# Patient Record
Sex: MALE | Race: WHITE | NOT HISPANIC OR LATINO | Employment: FULL TIME | URBAN - METROPOLITAN AREA
[De-identification: names, ages, dates, MRNs, and addresses within clinical notes are randomized per-mention and may not be internally consistent; named-entity substitution may affect disease eponyms.]

---

## 2020-02-12 ENCOUNTER — TELEPHONE (OUTPATIENT)
Dept: FAMILY MEDICINE | Facility: CLINIC | Age: 55
End: 2020-02-12

## 2020-02-13 ENCOUNTER — TELEPHONE (OUTPATIENT)
Dept: FAMILY MEDICINE | Facility: CLINIC | Age: 55
End: 2020-02-13

## 2020-02-13 NOTE — TELEPHONE ENCOUNTER
Spoke to patient that it is ok to wait until prior to ov for lab and that we will call a couple weeks prior to appt to get drawn. Remind me created.

## 2020-02-13 NOTE — TELEPHONE ENCOUNTER
Spoke to patient, states he would like to hold off on lab until prior to physical date of June 10th. States he really hasn't been watching his diet and also is concerned about insurance covering labs twice in a year. His last numbers for lipid in [facility] were total 218, HDL 64, Trig 48, . Send back to Maddy to call pt and create remind me

## 2020-05-27 ENCOUNTER — TELEPHONE (OUTPATIENT)
Dept: FAMILY MEDICINE | Facility: CLINIC | Age: 55
End: 2020-05-27

## 2020-05-27 DIAGNOSIS — Z00.00 ANNUAL PHYSICAL EXAM: Primary | ICD-10-CM

## 2020-05-27 NOTE — TELEPHONE ENCOUNTER
From remind me, lab due prior to ov. Needs all yearly labs. Orders pended to [facility]. LMOR for patient that orders were being sent to [facility], he can come to our office, and be sure he is fasting.

## 2020-06-04 LAB
ALBUMIN SERPL-MCNC: 4.3 G/DL (ref 3.6–5.1)
ALBUMIN/GLOB SERPL: 1.9 (CALC) (ref 1–2.5)
ALP SERPL-CCNC: 55 U/L (ref 35–144)
ALT SERPL-CCNC: 20 U/L (ref 9–46)
APPEARANCE UR: CLEAR
AST SERPL-CCNC: 21 U/L (ref 10–35)
BACTERIA #/AREA URNS HPF: NORMAL /HPF
BACTERIA UR CULT: NORMAL
BASOPHILS # BLD AUTO: 71 CELLS/UL (ref 0–200)
BASOPHILS NFR BLD AUTO: 1.7 %
BILIRUB SERPL-MCNC: 0.6 MG/DL (ref 0.2–1.2)
BILIRUB UR QL STRIP: NEGATIVE
BUN SERPL-MCNC: 19 MG/DL (ref 7–25)
BUN/CREAT SERPL: NORMAL (CALC) (ref 6–22)
CALCIUM SERPL-MCNC: 9.7 MG/DL (ref 8.6–10.3)
CHLORIDE SERPL-SCNC: 105 MMOL/L (ref 98–110)
CHOLEST SERPL-MCNC: 211 MG/DL
CHOLEST/HDLC SERPL: 3.2 (CALC)
CO2 SERPL-SCNC: 29 MMOL/L (ref 20–32)
COLOR UR: YELLOW
CREAT SERPL-MCNC: 0.92 MG/DL (ref 0.7–1.33)
EOSINOPHIL # BLD AUTO: 59 CELLS/UL (ref 15–500)
EOSINOPHIL NFR BLD AUTO: 1.4 %
ERYTHROCYTE [DISTWIDTH] IN BLOOD BY AUTOMATED COUNT: 12.5 % (ref 11–15)
GFRSERPLBLD MDRD-ARVRAT: 93 ML/MIN/1.73M2
GLOBULIN SER CALC-MCNC: 2.3 G/DL (CALC) (ref 1.9–3.7)
GLUCOSE SERPL-MCNC: 96 MG/DL (ref 65–99)
GLUCOSE UR QL STRIP: NEGATIVE
HCT VFR BLD AUTO: 43.9 % (ref 38.5–50)
HDLC SERPL-MCNC: 66 MG/DL
HGB BLD-MCNC: 14.4 G/DL (ref 13.2–17.1)
HGB UR QL STRIP: NEGATIVE
HYALINE CASTS #/AREA URNS LPF: NORMAL /LPF
KETONES UR QL STRIP: NEGATIVE
LDLC SERPL CALC-MCNC: 129 MG/DL (CALC)
LEUKOCYTE ESTERASE UR QL STRIP: NEGATIVE
LYMPHOCYTES # BLD AUTO: 1722 CELLS/UL (ref 850–3900)
LYMPHOCYTES NFR BLD AUTO: 41 %
MCH RBC QN AUTO: 31.2 PG (ref 27–33)
MCHC RBC AUTO-ENTMCNC: 32.8 G/DL (ref 32–36)
MCV RBC AUTO: 95.2 FL (ref 80–100)
MONOCYTES # BLD AUTO: 475 CELLS/UL (ref 200–950)
MONOCYTES NFR BLD AUTO: 11.3 %
NEUTROPHILS # BLD AUTO: 1873 CELLS/UL (ref 1500–7800)
NEUTROPHILS NFR BLD AUTO: 44.6 %
NITRITE UR QL STRIP: NEGATIVE
NONHDLC SERPL-MCNC: 145 MG/DL (CALC)
PH UR STRIP: 7.5 [PH] (ref 5–8)
PLATELET # BLD AUTO: 301 THOUSAND/UL (ref 140–400)
PMV BLD REES-ECKER: 11 FL (ref 7.5–12.5)
POTASSIUM SERPL-SCNC: 4.5 MMOL/L (ref 3.5–5.3)
PROT SERPL-MCNC: 6.6 G/DL (ref 6.1–8.1)
PROT UR QL STRIP: NEGATIVE
RBC # BLD AUTO: 4.61 MILLION/UL (ref 4.2–5.8)
RBC #/AREA URNS HPF: NORMAL /HPF
SODIUM SERPL-SCNC: 141 MMOL/L (ref 135–146)
SP GR UR STRIP: 1.02 (ref 1–1.03)
SQUAMOUS #/AREA URNS HPF: NORMAL /HPF
TRIGL SERPL-MCNC: 66 MG/DL
TSH SERPL-ACNC: 1.8 MIU/L (ref 0.4–4.5)
WBC # BLD AUTO: 4.2 THOUSAND/UL (ref 3.8–10.8)
WBC #/AREA URNS HPF: NORMAL /HPF

## 2020-06-10 ENCOUNTER — OFFICE VISIT (OUTPATIENT)
Dept: FAMILY MEDICINE | Facility: CLINIC | Age: 55
End: 2020-06-10
Payer: COMMERCIAL

## 2020-06-10 VITALS
HEART RATE: 76 BPM | SYSTOLIC BLOOD PRESSURE: 132 MMHG | WEIGHT: 215 LBS | DIASTOLIC BLOOD PRESSURE: 86 MMHG | BODY MASS INDEX: 31.84 KG/M2 | HEIGHT: 69 IN

## 2020-06-10 DIAGNOSIS — N20.0 NEPHROLITHIASIS: ICD-10-CM

## 2020-06-10 DIAGNOSIS — E78.00 PURE HYPERCHOLESTEROLEMIA: ICD-10-CM

## 2020-06-10 DIAGNOSIS — Z00.00 WELLNESS EXAMINATION: Primary | ICD-10-CM

## 2020-06-10 DIAGNOSIS — L57.0 ACTINIC KERATOSES: ICD-10-CM

## 2020-06-10 PROCEDURE — 99396 PREV VISIT EST AGE 40-64: CPT | Mod: S$GLB,,, | Performed by: FAMILY MEDICINE

## 2020-06-10 PROCEDURE — 99396 PR PREVENTIVE VISIT,EST,40-64: ICD-10-PCS | Mod: S$GLB,,, | Performed by: FAMILY MEDICINE

## 2020-06-10 RX ORDER — ASPIRIN 81 MG/1
81 TABLET ORAL DAILY
COMMUNITY

## 2020-06-10 RX ORDER — VIT C/E/ZN/COPPR/LUTEIN/ZEAXAN 250MG-90MG
1000 CAPSULE ORAL DAILY
COMMUNITY

## 2020-06-10 RX ORDER — FLAXSEED OIL 1000 MG
CAPSULE ORAL
COMMUNITY

## 2020-06-10 RX ORDER — LANOLIN ALCOHOL/MO/W.PET/CERES
100 CREAM (GRAM) TOPICAL DAILY
COMMUNITY

## 2020-06-10 RX ORDER — IBUPROFEN 100 MG/5ML
1000 SUSPENSION, ORAL (FINAL DOSE FORM) ORAL DAILY
COMMUNITY

## 2020-06-10 RX ORDER — GLUCOSAM/CHONDRO/HERB 149/HYAL 750-100 MG
TABLET ORAL
COMMUNITY

## 2020-06-10 RX ORDER — VITAMIN E 268 MG
400 CAPSULE ORAL DAILY
COMMUNITY

## 2020-06-10 NOTE — PROGRESS NOTES
SUBJECTIVE:    Patient ID: Trino Head is a 55 y.o. male.    Chief Complaint: Annual Exam (not taking any prescription meds // Colonoscopy - Dr. Chauhan ac)    This 55-year-old male comes in for his annual wellness checkup.  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  At night he walks in the neighborhood with his wife 30 min 3 times a week.  He states he does have some pains in his knees and wears knee sleeves and right elbow sleeve.    Last tetanus vaccine 2005.    2017-right knee injection with cortisone-[facility].    2016-colonoscopy Dr. Chauhan-[facility] 10 years.      Telephone on 05/27/2020   Component Date Value Ref Range Status    WBC 06/03/2020 4.2  3.8 - 10.8 Thousand/uL Final    RBC 06/03/2020 4.61  4.20 - 5.80 Million/uL Final    Hemoglobin 06/03/2020 14.4  13.2 - 17.1 g/dL Final    Hematocrit 06/03/2020 43.9  38.5 - 50.0 % Final    Mean Corpuscular Volume 06/03/2020 95.2  80.0 - 100.0 fL Final    Mean Corpuscular Hemoglobin 06/03/2020 31.2  27.0 - 33.0 pg Final    Mean Corpuscular Hemoglobin Conc 06/03/2020 32.8  32.0 - 36.0 g/dL Final    RDW 06/03/2020 12.5  11.0 - 15.0 % Final    Platelets 06/03/2020 301  140 - 400 Thousand/uL Final    MPV 06/03/2020 11.0  7.5 - 12.5 fL Final    Neutrophils Absolute 06/03/2020 1,873  1,500 - 7,800 cells/uL Final    Lymph # 06/03/2020 1,722  850 - 3,900 cells/uL Final    Mono # 06/03/2020 475  200 - 950 cells/uL Final    Eos # 06/03/2020 59  15 - 500 cells/uL Final    Baso # 06/03/2020 71  0 - 200 cells/uL Final    Neutrophils Relative 06/03/2020 44.6  % Final    Lymph% 06/03/2020 41.0  % Final    Mono% 06/03/2020 11.3  % Final    Eosinophil% 06/03/2020 1.4  % Final    Basophil% 06/03/2020 1.7  % Final    Glucose 06/03/2020 96  65 - 99 mg/dL Final    BUN, Bld 06/03/2020 19  7 - 25 mg/dL Final    Creatinine 06/03/2020 0.92  0.70 - 1.33 mg/dL Final    eGFR if non African  American 06/03/2020 93  > OR = 60 mL/min/1.73m2 Final    eGFR if African American 06/03/2020 108  > OR = 60 mL/min/1.73m2 Final    BUN/Creatinine Ratio 06/03/2020 NOT APPLICABLE  6 - 22 (calc) Final    Sodium 06/03/2020 141  135 - 146 mmol/L Final    Potassium 06/03/2020 4.5  3.5 - 5.3 mmol/L Final    Chloride 06/03/2020 105  98 - 110 mmol/L Final    CO2 06/03/2020 29  20 - 32 mmol/L Final    Calcium 06/03/2020 9.7  8.6 - 10.3 mg/dL Final    Total Protein 06/03/2020 6.6  6.1 - 8.1 g/dL Final    Albumin 06/03/2020 4.3  3.6 - 5.1 g/dL Final    Globulin, Total 06/03/2020 2.3  1.9 - 3.7 g/dL (calc) Final    Albumin/Globulin Ratio 06/03/2020 1.9  1.0 - 2.5 (calc) Final    Total Bilirubin 06/03/2020 0.6  0.2 - 1.2 mg/dL Final    Alkaline Phosphatase 06/03/2020 55  35 - 144 U/L Final    AST 06/03/2020 21  10 - 35 U/L Final    ALT 06/03/2020 20  9 - 46 U/L Final    Cholesterol 06/03/2020 211* <200 mg/dL Final    HDL 06/03/2020 66  > OR = 40 mg/dL Final    Triglycerides 06/03/2020 66  <150 mg/dL Final    LDL Cholesterol 06/03/2020 129* mg/dL (calc) Final    Hdl/Cholesterol Ratio 06/03/2020 3.2  <5.0 (calc) Final    Non HDL Chol. (LDL+VLDL) 06/03/2020 145* <130 mg/dL (calc) Final    TSH w/reflex to FT4 06/03/2020 1.80  0.40 - 4.50 mIU/L Final    Color, UA 06/03/2020 YELLOW  YELLOW Final    Appearance, UA 06/03/2020 CLEAR  CLEAR Final    Specific Gravity, UA 06/03/2020 1.022  1.001 - 1.035 Final    pH, UA 06/03/2020 7.5  5.0 - 8.0 Final    Glucose, UA 06/03/2020 NEGATIVE  NEGATIVE Final    Bilirubin, UA 06/03/2020 NEGATIVE  NEGATIVE Final    Ketones, UA 06/03/2020 NEGATIVE  NEGATIVE Final    Occult Blood UA 06/03/2020 NEGATIVE  NEGATIVE Final    Protein, UA 06/03/2020 NEGATIVE  NEGATIVE Final    Nitrite, UA 06/03/2020 NEGATIVE  NEGATIVE Final    Leukocytes, UA 06/03/2020 NEGATIVE  NEGATIVE Final    WBC Casts, UA 06/03/2020 NONE SEEN  < OR = 5 /HPF Final    RBC Casts, UA 06/03/2020 0-2  < OR =  2 /HPF Final    Squam Epithel, UA 06/03/2020 NONE SEEN  < OR = 5 /HPF Final    Bacteria, UA 06/03/2020 NONE SEEN  NONE SEEN /HPF Final    Hyaline Casts, UA 06/03/2020 NONE SEEN  NONE SEEN /LPF Final    Reflexive Urine Culture 06/03/2020 NO CULTURE INDICATED   Final       History reviewed. No pertinent past medical history.  Past Surgical History:   Procedure Laterality Date    COLONOSCOPY  2016    Dr. Chauhan[facility] 10 years     History reviewed. No pertinent family history.    Marital Status:   Alcohol History:  has no alcohol history on file.  Tobacco History:  reports that he has never smoked. He has never used smokeless tobacco.  Drug History:  has no drug history on file.    Review of patient's allergies indicates:  No Known Allergies    Current Outpatient Medications:     ascorbic acid, vitamin C, (VITAMIN C) 1000 MG tablet, Take 1,000 mg by mouth once daily., Disp: , Rfl:     aspirin (ECOTRIN) 81 MG EC tablet, Take 81 mg by mouth once daily., Disp: , Rfl:     cholecalciferol, vitamin D3, (VITAMIN D3) 25 mcg (1,000 unit) capsule, Take 1,000 Units by mouth once daily., Disp: , Rfl:     cyanocobalamin (VITAMIN B-12) 1000 MCG tablet, Take 100 mcg by mouth once daily., Disp: , Rfl:     flaxseed oil 1,000 mg Cap, Take by mouth., Disp: , Rfl:     ginkgo biloba 60 mg Cap, Take by mouth., Disp: , Rfl:     gluc hull/chondro hull A/vit C/Mn (GLUCOSAMINE 1500 COMPLEX ORAL), Take by mouth., Disp: , Rfl:     multivit-min/FA/lycopen/lutein (CENTRUM SILVER MEN ORAL), Take by mouth., Disp: , Rfl:     omega 3-dha-epa-fish oil (FISH OIL) 1,000 mg (120 mg-180 mg) Cap, Take by mouth., Disp: , Rfl:     TURMERIC ORAL, Take by mouth., Disp: , Rfl:     vitamin E 400 UNIT capsule, Take 400 Units by mouth once daily., Disp: , Rfl:     Review of Systems   Constitutional: Negative for appetite change, chills, fatigue, fever and unexpected weight change.   HENT: Negative for congestion, ear pain and trouble swallowing.   "  Eyes: Negative for pain, discharge and visual disturbance.   Respiratory: Negative for apnea, cough, shortness of breath and wheezing.    Cardiovascular: Negative for chest pain and leg swelling.   Gastrointestinal: Negative for abdominal pain, blood in stool, constipation, diarrhea, nausea and vomiting.        Occas  gerd , uses  Tums occas ,    Endocrine: Negative for cold intolerance, heat intolerance and polydipsia.   Genitourinary: Negative for dysuria, hematuria, testicular pain and urgency.        1 yr ago passed  A  Kidney stone.   Musculoskeletal: Positive for arthralgias (rt knee occas aches , occas  Aleve,). Negative for gait problem, joint swelling and myalgias.   Skin:        Sees derm annually AK's removed ,Dr Paige Leigh   Neurological: Negative for dizziness, seizures and numbness.   Psychiatric/Behavioral: Negative for agitation, behavioral problems and hallucinations. The patient is not nervous/anxious.           Objective:      Vitals:    06/10/20 0849   BP: 132/86   Pulse: 76   Weight: 97.5 kg (215 lb)   Height: 5' 9" (1.753 m)     Body mass index is 31.75 kg/m².  Physical Exam   Constitutional: He is oriented to person, place, and time. He appears well-developed and well-nourished.   HENT:   Head: Normocephalic and atraumatic.   Right Ear: External ear normal.   Left Ear: External ear normal.   Nose: Nose normal.   Mouth/Throat: Oropharynx is clear and moist.   Eyes: Pupils are equal, round, and reactive to light. EOM are normal.   Neck: Normal range of motion. Neck supple. Carotid bruit is not present. No thyromegaly present.   Cardiovascular: Normal rate, regular rhythm, normal heart sounds and intact distal pulses.   No murmur heard.  Pulmonary/Chest: Effort normal and breath sounds normal. He has no wheezes. He has no rales.   Abdominal: Soft. Bowel sounds are normal. He exhibits no distension. There is no hepatosplenomegaly. There is no tenderness.   Musculoskeletal: Normal range of " motion. He exhibits no tenderness or deformity.        Lumbar back: Normal. He exhibits no pain and no spasm.   Bends 90 degrees at  waist, shoulders and knees have full range of motion, no peripheral edema seen.  Walks with a normal gait.   Lymphadenopathy:     He has no cervical adenopathy.   Neurological: He is alert and oriented to person, place, and time. No cranial nerve deficit. Coordination normal.   Skin: Skin is warm and dry. No rash noted.   No actinic keratoses visualized today   Psychiatric: He has a normal mood and affect. His behavior is normal. Judgment and thought content normal.   Nursing note and vitals reviewed.        Assessment:       1. Wellness examination    2. Nephrolithiasis    3. Pure hypercholesterolemia    4. Actinic keratoses         Plan:       Wellness examination  Patient has very healthy lifestyle.  No smoking.  Continue exercise in the neighborhood as is  Nephrolithiasis  No recent kidney stones  Pure hypercholesterolemia  Cholesterol mild elevation.  HDL looks elevated with some minimal LDL elevation.  Reduce fried foods in the diet  Actinic keratoses  Continue dermatologic annual visits  Offered tetanus vaccine if he develops a skin puncture wound  No follow-ups on file.

## 2020-06-10 NOTE — PATIENT INSTRUCTIONS
Understanding Actinic Keratosis     Wear a hat that protects your face and ears from the sun.     Actinic keratosis is a skin growth caused by sun damage. These growths are not cancer, but they may develop into skin cancer (precancerous). Many people get these growths, especially as they age. This is because of cumulative sun exposure over years. Multiple growths are called actinic keratoses.  What causes actinic keratosis?  Sun damage causes actinic keratosis. These growths usually appear on skin that is most often exposed to the sun, such as the face, ears, or back of the hands. People who easily sunburn are likely to develop actinic keratoses. Actinic keratoses often appear later in life from cumulative, extensive sun exposure.  What are the symptoms of actinic keratosis?  Actinic keratosis growths may be described as:  · Rough, like sandpaper  · Wart-like  · Scaly or scabby  · More easily felt than seen  They may appear singly or in clusters. They may start out as red patches, and the skin around them may be discolored.  Actinic keratoses usually are not painful. For some people they may make the skin feel tender.  How is actinic keratosis treated?  Because actinic keratoses may develop into skin cancer, a healthcare provider should look at them. Your healthcare provider may choose to remove one or more of these growths. It may be examined under a microscope. This is called a biopsy. You may also wish to have actinic keratoses removed if they bother you. They can be removed in several ways:  · By using a medicine on the skin such as 5 fluorouracil or imiquimod  · By removing them with a scalpel  · By freezing them with liquid nitrogen  How can I prevent actinic keratoses?  Avoiding sun damage to your skin is the best way to prevent actinic keratoses. Here are some ways to protect your skin:  · Use sunscreen with an SPF of 30 or higher on exposed skin when you are outside.  · Wear a hat to protect your face and  scalp. Consider wearing clothing that covers your arms and legs.  · Avoid the sun in the middle of the day, when sunlight is most direct.  · Be aware of how long you have been out in the sun. Reapply sunscreen according to package directions.  · Do not use tanning beds.  What are the complications of actinic keratosis?  Actinic keratoses are a sign of skin damage. They may become cancerous. Its a good idea to ask your healthcare provider to check new skin growths. Report any skin problem that concerns you.  When should I call my healthcare provider?  Call your healthcare provider right away if any of these occur:  · You have an actinic keratosis sore that does not respond to treatment  · You have an actinic keratosis sore that does not heal within a few weeks, or heals and then comes back  · You have a skin growth that is changing in size, shape, or color  · You have a skin growth that looks different on one side from the other  · You have a skin growth that is not the same color throughout   Date Last Reviewed: 5/1/2016  © 0560-8275 [employer]. [address] [place] [address], [place] [address]. All rights reserved. This information is not intended as a substitute for professional medical care. Always follow your healthcare professional's instructions.

## 2020-06-10 NOTE — LETTER
[address] [facility] [address]  [place] [address]  Phone: [phone]   [phone]                        MD Dread Deng MD Chequita Williams, MD Matthew Bassett, PA-C Allison Hoffritz, SHELDON Cervantes NP      Date: 06/10/2020        Patient: Trino Head  YOB: 1965      Please fax a copy of pt's recent colonoscopy.      Sincerely,     Mayelin Sullivan MA

## 2021-05-26 ENCOUNTER — OFFICE VISIT (OUTPATIENT)
Dept: FAMILY MEDICINE | Facility: CLINIC | Age: 56
End: 2021-05-26
Payer: COMMERCIAL

## 2021-05-26 VITALS
SYSTOLIC BLOOD PRESSURE: 140 MMHG | HEART RATE: 76 BPM | WEIGHT: 211 LBS | DIASTOLIC BLOOD PRESSURE: 86 MMHG | BODY MASS INDEX: 31.25 KG/M2 | HEIGHT: 69 IN

## 2021-05-26 DIAGNOSIS — R03.0 ELEVATED BLOOD PRESSURE READING IN OFFICE WITHOUT DIAGNOSIS OF HYPERTENSION: ICD-10-CM

## 2021-05-26 DIAGNOSIS — M62.838 MUSCLE SPASM OF LEFT LOWER EXTREMITY: Primary | ICD-10-CM

## 2021-05-26 PROCEDURE — 99213 OFFICE O/P EST LOW 20 MIN: CPT | Mod: S$GLB,,, | Performed by: NURSE PRACTITIONER

## 2021-05-26 PROCEDURE — 1125F PR PAIN SEVERITY QUANTIFIED, PAIN PRESENT: ICD-10-PCS | Mod: S$GLB,,, | Performed by: NURSE PRACTITIONER

## 2021-05-26 PROCEDURE — 1125F AMNT PAIN NOTED PAIN PRSNT: CPT | Mod: S$GLB,,, | Performed by: NURSE PRACTITIONER

## 2021-05-26 PROCEDURE — 3008F BODY MASS INDEX DOCD: CPT | Mod: S$GLB,,, | Performed by: NURSE PRACTITIONER

## 2021-05-26 PROCEDURE — 99213 PR OFFICE/OUTPT VISIT, EST, LEVL III, 20-29 MIN: ICD-10-PCS | Mod: S$GLB,,, | Performed by: NURSE PRACTITIONER

## 2021-05-26 PROCEDURE — 3008F PR BODY MASS INDEX (BMI) DOCUMENTED: ICD-10-PCS | Mod: S$GLB,,, | Performed by: NURSE PRACTITIONER

## 2021-05-26 RX ORDER — METHYLPREDNISOLONE 4 MG/1
TABLET ORAL
Qty: 1 PACKAGE | Refills: 0 | Status: SHIPPED | OUTPATIENT
Start: 2021-05-26 | End: 2021-06-16

## 2021-06-02 ENCOUNTER — TELEPHONE (OUTPATIENT)
Dept: FAMILY MEDICINE | Facility: CLINIC | Age: 56
End: 2021-06-02

## 2021-06-02 DIAGNOSIS — Z12.5 SPECIAL SCREENING FOR MALIGNANT NEOPLASM OF PROSTATE: ICD-10-CM

## 2021-06-02 DIAGNOSIS — E78.00 PURE HYPERCHOLESTEROLEMIA: ICD-10-CM

## 2021-06-02 DIAGNOSIS — Z00.00 ROUTINE GENERAL MEDICAL EXAMINATION AT A HEALTH CARE FACILITY: Primary | ICD-10-CM

## 2021-06-02 DIAGNOSIS — Z79.899 ENCOUNTER FOR LONG-TERM (CURRENT) USE OF OTHER MEDICATIONS: ICD-10-CM

## 2021-06-09 ENCOUNTER — CLINICAL SUPPORT (OUTPATIENT)
Dept: REHABILITATION | Facility: HOSPITAL | Age: 56
End: 2021-06-09
Attending: NURSE PRACTITIONER
Payer: COMMERCIAL

## 2021-06-09 DIAGNOSIS — M54.32 SCIATICA OF LEFT SIDE: ICD-10-CM

## 2021-06-09 DIAGNOSIS — M62.838 MUSCLE SPASM OF LEFT LOWER EXTREMITY: ICD-10-CM

## 2021-06-09 PROCEDURE — 97110 THERAPEUTIC EXERCISES: CPT | Mod: PN

## 2021-06-09 PROCEDURE — 97161 PT EVAL LOW COMPLEX 20 MIN: CPT | Mod: PN

## 2021-06-10 LAB
ALBUMIN SERPL-MCNC: 4 G/DL (ref 3.6–5.1)
ALBUMIN/GLOB SERPL: 1.7 (CALC) (ref 1–2.5)
ALP SERPL-CCNC: 51 U/L (ref 35–144)
ALT SERPL-CCNC: 16 U/L (ref 9–46)
APPEARANCE UR: CLEAR
AST SERPL-CCNC: 18 U/L (ref 10–35)
BACTERIA #/AREA URNS HPF: NORMAL /HPF
BACTERIA UR CULT: NORMAL
BASOPHILS # BLD AUTO: 60 CELLS/UL (ref 0–200)
BASOPHILS NFR BLD AUTO: 1.2 %
BILIRUB SERPL-MCNC: 0.6 MG/DL (ref 0.2–1.2)
BILIRUB UR QL STRIP: NEGATIVE
BUN SERPL-MCNC: 25 MG/DL (ref 7–25)
BUN/CREAT SERPL: NORMAL (CALC) (ref 6–22)
CALCIUM SERPL-MCNC: 9.6 MG/DL (ref 8.6–10.3)
CHLORIDE SERPL-SCNC: 106 MMOL/L (ref 98–110)
CHOLEST SERPL-MCNC: 216 MG/DL
CHOLEST/HDLC SERPL: 3.4 (CALC)
CO2 SERPL-SCNC: 31 MMOL/L (ref 20–32)
COLOR UR: YELLOW
CREAT SERPL-MCNC: 0.85 MG/DL (ref 0.7–1.33)
EOSINOPHIL # BLD AUTO: 60 CELLS/UL (ref 15–500)
EOSINOPHIL NFR BLD AUTO: 1.2 %
ERYTHROCYTE [DISTWIDTH] IN BLOOD BY AUTOMATED COUNT: 12.8 % (ref 11–15)
GLOBULIN SER CALC-MCNC: 2.3 G/DL (CALC) (ref 1.9–3.7)
GLUCOSE SERPL-MCNC: 88 MG/DL (ref 65–99)
GLUCOSE UR QL STRIP: NEGATIVE
HCT VFR BLD AUTO: 43.1 % (ref 38.5–50)
HDLC SERPL-MCNC: 64 MG/DL
HGB BLD-MCNC: 14.3 G/DL (ref 13.2–17.1)
HGB UR QL STRIP: NEGATIVE
HYALINE CASTS #/AREA URNS LPF: NORMAL /LPF
KETONES UR QL STRIP: NEGATIVE
LDLC SERPL CALC-MCNC: 139 MG/DL (CALC)
LEUKOCYTE ESTERASE UR QL STRIP: NEGATIVE
LYMPHOCYTES # BLD AUTO: 1860 CELLS/UL (ref 850–3900)
LYMPHOCYTES NFR BLD AUTO: 37.2 %
MCH RBC QN AUTO: 31.4 PG (ref 27–33)
MCHC RBC AUTO-ENTMCNC: 33.2 G/DL (ref 32–36)
MCV RBC AUTO: 94.7 FL (ref 80–100)
MONOCYTES # BLD AUTO: 525 CELLS/UL (ref 200–950)
MONOCYTES NFR BLD AUTO: 10.5 %
NEUTROPHILS # BLD AUTO: 2495 CELLS/UL (ref 1500–7800)
NEUTROPHILS NFR BLD AUTO: 49.9 %
NITRITE UR QL STRIP: NEGATIVE
NONHDLC SERPL-MCNC: 152 MG/DL (CALC)
PH UR STRIP: 6 [PH] (ref 5–8)
PLATELET # BLD AUTO: 284 THOUSAND/UL (ref 140–400)
PMV BLD REES-ECKER: 10.3 FL (ref 7.5–12.5)
POTASSIUM SERPL-SCNC: 4.5 MMOL/L (ref 3.5–5.3)
PROT SERPL-MCNC: 6.3 G/DL (ref 6.1–8.1)
PROT UR QL STRIP: NEGATIVE
PSA SERPL-MCNC: 0.5 NG/ML
RBC # BLD AUTO: 4.55 MILLION/UL (ref 4.2–5.8)
RBC #/AREA URNS HPF: NORMAL /HPF
SODIUM SERPL-SCNC: 142 MMOL/L (ref 135–146)
SP GR UR STRIP: 1.03 (ref 1–1.03)
SQUAMOUS #/AREA URNS HPF: NORMAL /HPF
TRIGL SERPL-MCNC: 42 MG/DL
TSH SERPL-ACNC: 1.45 MIU/L (ref 0.4–4.5)
WBC # BLD AUTO: 5 THOUSAND/UL (ref 3.8–10.8)
WBC #/AREA URNS HPF: NORMAL /HPF

## 2021-06-16 ENCOUNTER — CLINICAL SUPPORT (OUTPATIENT)
Dept: REHABILITATION | Facility: HOSPITAL | Age: 56
End: 2021-06-16
Attending: NURSE PRACTITIONER
Payer: COMMERCIAL

## 2021-06-16 ENCOUNTER — OFFICE VISIT (OUTPATIENT)
Dept: FAMILY MEDICINE | Facility: CLINIC | Age: 56
End: 2021-06-16
Payer: COMMERCIAL

## 2021-06-16 VITALS
HEART RATE: 60 BPM | DIASTOLIC BLOOD PRESSURE: 88 MMHG | SYSTOLIC BLOOD PRESSURE: 132 MMHG | HEIGHT: 69 IN | BODY MASS INDEX: 31.1 KG/M2 | WEIGHT: 210 LBS

## 2021-06-16 DIAGNOSIS — M54.32 SCIATICA OF LEFT SIDE: ICD-10-CM

## 2021-06-16 DIAGNOSIS — E78.00 PURE HYPERCHOLESTEROLEMIA: ICD-10-CM

## 2021-06-16 DIAGNOSIS — Z00.00 WELLNESS EXAMINATION: Primary | ICD-10-CM

## 2021-06-16 DIAGNOSIS — L57.0 ACTINIC KERATOSES: ICD-10-CM

## 2021-06-16 DIAGNOSIS — G25.2 INTENTION TREMOR: ICD-10-CM

## 2021-06-16 DIAGNOSIS — N20.0 NEPHROLITHIASIS: ICD-10-CM

## 2021-06-16 PROCEDURE — 97112 NEUROMUSCULAR REEDUCATION: CPT | Mod: PN

## 2021-06-16 PROCEDURE — 3008F PR BODY MASS INDEX (BMI) DOCUMENTED: ICD-10-PCS | Mod: S$GLB,,, | Performed by: FAMILY MEDICINE

## 2021-06-16 PROCEDURE — 99396 PREV VISIT EST AGE 40-64: CPT | Mod: S$GLB,,, | Performed by: FAMILY MEDICINE

## 2021-06-16 PROCEDURE — 97110 THERAPEUTIC EXERCISES: CPT | Mod: PN

## 2021-06-16 PROCEDURE — 99396 PR PREVENTIVE VISIT,EST,40-64: ICD-10-PCS | Mod: S$GLB,,, | Performed by: FAMILY MEDICINE

## 2021-06-16 PROCEDURE — 3008F BODY MASS INDEX DOCD: CPT | Mod: S$GLB,,, | Performed by: FAMILY MEDICINE

## 2021-06-23 ENCOUNTER — OFFICE VISIT (OUTPATIENT)
Dept: ORTHOPEDICS | Facility: CLINIC | Age: 56
End: 2021-06-23
Payer: COMMERCIAL

## 2021-06-23 ENCOUNTER — CLINICAL SUPPORT (OUTPATIENT)
Dept: REHABILITATION | Facility: HOSPITAL | Age: 56
End: 2021-06-23
Attending: NURSE PRACTITIONER
Payer: COMMERCIAL

## 2021-06-23 VITALS
HEART RATE: 82 BPM | SYSTOLIC BLOOD PRESSURE: 134 MMHG | BODY MASS INDEX: 31.1 KG/M2 | HEIGHT: 69 IN | WEIGHT: 210 LBS | DIASTOLIC BLOOD PRESSURE: 82 MMHG

## 2021-06-23 DIAGNOSIS — M51.36 DISC DEGENERATION, LUMBAR: ICD-10-CM

## 2021-06-23 DIAGNOSIS — M54.16 LUMBAR RADICULOPATHY: Primary | ICD-10-CM

## 2021-06-23 DIAGNOSIS — M54.32 SCIATICA OF LEFT SIDE: ICD-10-CM

## 2021-06-23 PROCEDURE — 1125F AMNT PAIN NOTED PAIN PRSNT: CPT | Mod: S$GLB,,, | Performed by: ORTHOPAEDIC SURGERY

## 2021-06-23 PROCEDURE — 3008F PR BODY MASS INDEX (BMI) DOCUMENTED: ICD-10-PCS | Mod: S$GLB,,, | Performed by: ORTHOPAEDIC SURGERY

## 2021-06-23 PROCEDURE — 97110 THERAPEUTIC EXERCISES: CPT | Mod: PN

## 2021-06-23 PROCEDURE — 99203 OFFICE O/P NEW LOW 30 MIN: CPT | Mod: S$GLB,,, | Performed by: ORTHOPAEDIC SURGERY

## 2021-06-23 PROCEDURE — 97112 NEUROMUSCULAR REEDUCATION: CPT | Mod: PN

## 2021-06-23 PROCEDURE — 1125F PR PAIN SEVERITY QUANTIFIED, PAIN PRESENT: ICD-10-PCS | Mod: S$GLB,,, | Performed by: ORTHOPAEDIC SURGERY

## 2021-06-23 PROCEDURE — 99203 PR OFFICE/OUTPT VISIT, NEW, LEVL III, 30-44 MIN: ICD-10-PCS | Mod: S$GLB,,, | Performed by: ORTHOPAEDIC SURGERY

## 2021-06-23 PROCEDURE — 3008F BODY MASS INDEX DOCD: CPT | Mod: S$GLB,,, | Performed by: ORTHOPAEDIC SURGERY

## 2021-06-23 PROCEDURE — 97012 MECHANICAL TRACTION THERAPY: CPT | Mod: PN

## 2021-06-23 RX ORDER — TIZANIDINE 4 MG/1
4 TABLET ORAL 2 TIMES DAILY
Qty: 40 TABLET | Refills: 1 | Status: SHIPPED | OUTPATIENT
Start: 2021-06-23 | End: 2021-07-13

## 2021-06-23 RX ORDER — OXAPROZIN 600 MG/1
600 TABLET, FILM COATED ORAL 2 TIMES DAILY
Qty: 60 TABLET | Refills: 0 | Status: SHIPPED | OUTPATIENT
Start: 2021-06-23 | End: 2021-07-14 | Stop reason: SDUPTHER

## 2021-06-30 ENCOUNTER — CLINICAL SUPPORT (OUTPATIENT)
Dept: REHABILITATION | Facility: HOSPITAL | Age: 56
End: 2021-06-30
Attending: NURSE PRACTITIONER
Payer: COMMERCIAL

## 2021-06-30 DIAGNOSIS — M54.32 SCIATICA OF LEFT SIDE: ICD-10-CM

## 2021-06-30 PROCEDURE — 97112 NEUROMUSCULAR REEDUCATION: CPT | Mod: PN

## 2021-06-30 PROCEDURE — 97012 MECHANICAL TRACTION THERAPY: CPT | Mod: PN

## 2021-06-30 PROCEDURE — 97110 THERAPEUTIC EXERCISES: CPT | Mod: PN

## 2021-07-14 ENCOUNTER — TELEPHONE (OUTPATIENT)
Dept: ORTHOPEDICS | Facility: CLINIC | Age: 56
End: 2021-07-14

## 2021-07-14 ENCOUNTER — CLINICAL SUPPORT (OUTPATIENT)
Dept: REHABILITATION | Facility: HOSPITAL | Age: 56
End: 2021-07-14
Attending: NURSE PRACTITIONER
Payer: COMMERCIAL

## 2021-07-14 ENCOUNTER — HOSPITAL ENCOUNTER (OUTPATIENT)
Dept: RADIOLOGY | Facility: HOSPITAL | Age: 56
Discharge: HOME | End: 2021-07-14
Attending: ORTHOPAEDIC SURGERY
Payer: COMMERCIAL

## 2021-07-14 DIAGNOSIS — M54.32 SCIATICA OF LEFT SIDE: ICD-10-CM

## 2021-07-14 DIAGNOSIS — M54.16 LUMBAR RADICULOPATHY: Primary | ICD-10-CM

## 2021-07-14 DIAGNOSIS — M54.16 LUMBAR RADICULOPATHY: ICD-10-CM

## 2021-07-14 DIAGNOSIS — M51.36 DISC DEGENERATION, LUMBAR: ICD-10-CM

## 2021-07-14 PROCEDURE — 97110 THERAPEUTIC EXERCISES: CPT | Mod: PN,CQ

## 2021-07-14 PROCEDURE — 97012 MECHANICAL TRACTION THERAPY: CPT | Mod: PN,CQ

## 2021-07-14 PROCEDURE — 72148 MRI LUMBAR SPINE W/O DYE: CPT | Mod: TC,PO

## 2021-07-14 PROCEDURE — 97112 NEUROMUSCULAR REEDUCATION: CPT | Mod: PN,CQ

## 2021-07-14 RX ORDER — OXAPROZIN 600 MG/1
600 TABLET, FILM COATED ORAL 2 TIMES DAILY
Qty: 60 TABLET | Refills: 2 | Status: SHIPPED | OUTPATIENT
Start: 2021-07-14 | End: 2021-08-13

## 2021-07-21 ENCOUNTER — OFFICE VISIT (OUTPATIENT)
Dept: ORTHOPEDICS | Facility: CLINIC | Age: 56
End: 2021-07-21
Payer: COMMERCIAL

## 2021-07-21 ENCOUNTER — CLINICAL SUPPORT (OUTPATIENT)
Dept: REHABILITATION | Facility: HOSPITAL | Age: 56
End: 2021-07-21
Attending: NURSE PRACTITIONER
Payer: COMMERCIAL

## 2021-07-21 VITALS — BODY MASS INDEX: 31.1 KG/M2 | WEIGHT: 210 LBS | HEIGHT: 69 IN

## 2021-07-21 DIAGNOSIS — M54.32 SCIATICA OF LEFT SIDE: ICD-10-CM

## 2021-07-21 DIAGNOSIS — M47.816 FACET ARTHRITIS OF LUMBAR REGION: ICD-10-CM

## 2021-07-21 DIAGNOSIS — M51.36 DISC DEGENERATION, LUMBAR: ICD-10-CM

## 2021-07-21 DIAGNOSIS — M54.16 LUMBAR RADICULOPATHY: Primary | ICD-10-CM

## 2021-07-21 DIAGNOSIS — M48.061 FORAMINAL STENOSIS OF LUMBAR REGION: ICD-10-CM

## 2021-07-21 PROCEDURE — 99213 PR OFFICE/OUTPT VISIT, EST, LEVL III, 20-29 MIN: ICD-10-PCS | Mod: S$GLB,,, | Performed by: ORTHOPAEDIC SURGERY

## 2021-07-21 PROCEDURE — 3008F PR BODY MASS INDEX (BMI) DOCUMENTED: ICD-10-PCS | Mod: S$GLB,,, | Performed by: ORTHOPAEDIC SURGERY

## 2021-07-21 PROCEDURE — 3008F BODY MASS INDEX DOCD: CPT | Mod: S$GLB,,, | Performed by: ORTHOPAEDIC SURGERY

## 2021-07-21 PROCEDURE — 1125F AMNT PAIN NOTED PAIN PRSNT: CPT | Mod: S$GLB,,, | Performed by: ORTHOPAEDIC SURGERY

## 2021-07-21 PROCEDURE — 97110 THERAPEUTIC EXERCISES: CPT | Mod: PN,CQ

## 2021-07-21 PROCEDURE — 97012 MECHANICAL TRACTION THERAPY: CPT | Mod: PN,CQ

## 2021-07-21 PROCEDURE — 97112 NEUROMUSCULAR REEDUCATION: CPT | Mod: PN,CQ

## 2021-07-21 PROCEDURE — 1125F PR PAIN SEVERITY QUANTIFIED, PAIN PRESENT: ICD-10-PCS | Mod: S$GLB,,, | Performed by: ORTHOPAEDIC SURGERY

## 2021-07-21 PROCEDURE — 99213 OFFICE O/P EST LOW 20 MIN: CPT | Mod: S$GLB,,, | Performed by: ORTHOPAEDIC SURGERY

## 2021-07-21 RX ORDER — TIZANIDINE 4 MG/1
TABLET ORAL
COMMUNITY
Start: 2021-07-17 | End: 2022-06-15 | Stop reason: ALTCHOICE

## 2021-07-28 ENCOUNTER — CLINICAL SUPPORT (OUTPATIENT)
Dept: REHABILITATION | Facility: HOSPITAL | Age: 56
End: 2021-07-28
Attending: NURSE PRACTITIONER
Payer: COMMERCIAL

## 2021-07-28 DIAGNOSIS — M54.32 SCIATICA OF LEFT SIDE: ICD-10-CM

## 2021-07-28 PROCEDURE — 97012 MECHANICAL TRACTION THERAPY: CPT | Mod: PN,CQ

## 2021-07-28 PROCEDURE — 97112 NEUROMUSCULAR REEDUCATION: CPT | Mod: PN,CQ

## 2021-07-28 PROCEDURE — 97110 THERAPEUTIC EXERCISES: CPT | Mod: PN,CQ

## 2021-08-04 ENCOUNTER — CLINICAL SUPPORT (OUTPATIENT)
Dept: REHABILITATION | Facility: HOSPITAL | Age: 56
End: 2021-08-04
Attending: NURSE PRACTITIONER
Payer: COMMERCIAL

## 2021-08-04 DIAGNOSIS — M54.32 SCIATICA OF LEFT SIDE: ICD-10-CM

## 2021-08-04 PROCEDURE — 97110 THERAPEUTIC EXERCISES: CPT | Mod: PN,CQ

## 2021-08-04 PROCEDURE — 97112 NEUROMUSCULAR REEDUCATION: CPT | Mod: PN,CQ

## 2021-08-11 ENCOUNTER — CLINICAL SUPPORT (OUTPATIENT)
Dept: REHABILITATION | Facility: HOSPITAL | Age: 56
End: 2021-08-11
Attending: NURSE PRACTITIONER
Payer: COMMERCIAL

## 2021-08-11 DIAGNOSIS — M54.32 SCIATICA OF LEFT SIDE: ICD-10-CM

## 2021-08-11 PROCEDURE — 97110 THERAPEUTIC EXERCISES: CPT | Mod: PN

## 2021-08-11 PROCEDURE — 97530 THERAPEUTIC ACTIVITIES: CPT | Mod: PN

## 2021-08-11 PROCEDURE — 97112 NEUROMUSCULAR REEDUCATION: CPT | Mod: PN

## 2021-08-11 PROCEDURE — 97012 MECHANICAL TRACTION THERAPY: CPT | Mod: PN

## 2021-08-13 PROBLEM — M54.32 SCIATICA OF LEFT SIDE: Status: RESOLVED | Noted: 2021-06-09 | Resolved: 2021-08-13

## 2021-09-22 ENCOUNTER — OFFICE VISIT (OUTPATIENT)
Dept: ORTHOPEDICS | Facility: CLINIC | Age: 56
End: 2021-09-22
Payer: COMMERCIAL

## 2021-09-22 VITALS — HEIGHT: 69 IN | WEIGHT: 210 LBS | BODY MASS INDEX: 31.1 KG/M2

## 2021-09-22 DIAGNOSIS — M48.061 FORAMINAL STENOSIS OF LUMBAR REGION: ICD-10-CM

## 2021-09-22 DIAGNOSIS — M54.16 LUMBAR RADICULOPATHY: Primary | ICD-10-CM

## 2021-09-22 DIAGNOSIS — M47.816 FACET ARTHRITIS OF LUMBAR REGION: ICD-10-CM

## 2021-09-22 DIAGNOSIS — M51.36 DISC DEGENERATION, LUMBAR: ICD-10-CM

## 2021-09-22 PROCEDURE — 99213 PR OFFICE/OUTPT VISIT, EST, LEVL III, 20-29 MIN: ICD-10-PCS | Mod: S$GLB,,, | Performed by: ORTHOPAEDIC SURGERY

## 2021-09-22 PROCEDURE — 3008F BODY MASS INDEX DOCD: CPT | Mod: S$GLB,,, | Performed by: ORTHOPAEDIC SURGERY

## 2021-09-22 PROCEDURE — 1160F RVW MEDS BY RX/DR IN RCRD: CPT | Mod: S$GLB,,, | Performed by: ORTHOPAEDIC SURGERY

## 2021-09-22 PROCEDURE — 1160F PR REVIEW ALL MEDS BY PRESCRIBER/CLIN PHARMACIST DOCUMENTED: ICD-10-PCS | Mod: S$GLB,,, | Performed by: ORTHOPAEDIC SURGERY

## 2021-09-22 PROCEDURE — 3008F PR BODY MASS INDEX (BMI) DOCUMENTED: ICD-10-PCS | Mod: S$GLB,,, | Performed by: ORTHOPAEDIC SURGERY

## 2021-09-22 PROCEDURE — 99213 OFFICE O/P EST LOW 20 MIN: CPT | Mod: S$GLB,,, | Performed by: ORTHOPAEDIC SURGERY

## 2021-09-22 RX ORDER — OXAPROZIN 600 MG/1
600 TABLET, FILM COATED ORAL 2 TIMES DAILY
COMMUNITY
Start: 2021-09-11 | End: 2021-10-14 | Stop reason: SDUPTHER

## 2021-10-15 RX ORDER — OXAPROZIN 600 MG/1
600 TABLET, FILM COATED ORAL 2 TIMES DAILY
Qty: 60 TABLET | Refills: 0 | Status: SHIPPED | OUTPATIENT
Start: 2021-10-15 | End: 2021-11-15

## 2022-02-02 ENCOUNTER — OFFICE VISIT (OUTPATIENT)
Dept: ORTHOPEDICS | Facility: CLINIC | Age: 57
End: 2022-02-02
Payer: COMMERCIAL

## 2022-02-02 VITALS — BODY MASS INDEX: 31.1 KG/M2 | HEIGHT: 69 IN | WEIGHT: 210 LBS

## 2022-02-02 DIAGNOSIS — M25.512 ACUTE PAIN OF BOTH SHOULDERS: Primary | ICD-10-CM

## 2022-02-02 DIAGNOSIS — M75.41 SHOULDER IMPINGEMENT SYNDROME, RIGHT: ICD-10-CM

## 2022-02-02 DIAGNOSIS — M75.42 SHOULDER IMPINGEMENT SYNDROME, LEFT: ICD-10-CM

## 2022-02-02 DIAGNOSIS — M25.511 ACUTE PAIN OF BOTH SHOULDERS: Primary | ICD-10-CM

## 2022-02-02 PROCEDURE — 99213 OFFICE O/P EST LOW 20 MIN: CPT | Mod: 25,S$GLB,, | Performed by: ORTHOPAEDIC SURGERY

## 2022-02-02 PROCEDURE — 1160F RVW MEDS BY RX/DR IN RCRD: CPT | Mod: S$GLB,,, | Performed by: ORTHOPAEDIC SURGERY

## 2022-02-02 PROCEDURE — 20610 DRAIN/INJ JOINT/BURSA W/O US: CPT | Mod: 50,S$GLB,, | Performed by: ORTHOPAEDIC SURGERY

## 2022-02-02 PROCEDURE — 99213 PR OFFICE/OUTPT VISIT, EST, LEVL III, 20-29 MIN: ICD-10-PCS | Mod: 25,S$GLB,, | Performed by: ORTHOPAEDIC SURGERY

## 2022-02-02 PROCEDURE — 1160F PR REVIEW ALL MEDS BY PRESCRIBER/CLIN PHARMACIST DOCUMENTED: ICD-10-PCS | Mod: S$GLB,,, | Performed by: ORTHOPAEDIC SURGERY

## 2022-02-02 PROCEDURE — 3008F PR BODY MASS INDEX (BMI) DOCUMENTED: ICD-10-PCS | Mod: S$GLB,,, | Performed by: ORTHOPAEDIC SURGERY

## 2022-02-02 PROCEDURE — 3008F BODY MASS INDEX DOCD: CPT | Mod: S$GLB,,, | Performed by: ORTHOPAEDIC SURGERY

## 2022-02-02 PROCEDURE — 20610 LARGE JOINT ASPIRATION/INJECTION: R SUBACROMIAL BURSA: ICD-10-PCS | Mod: 50,S$GLB,, | Performed by: ORTHOPAEDIC SURGERY

## 2022-02-02 RX ORDER — TRIAMCINOLONE ACETONIDE 40 MG/ML
40 INJECTION, SUSPENSION INTRA-ARTICULAR; INTRAMUSCULAR
Status: DISCONTINUED | OUTPATIENT
Start: 2022-02-02 | End: 2022-02-02 | Stop reason: HOSPADM

## 2022-02-02 RX ADMIN — TRIAMCINOLONE ACETONIDE 40 MG: 40 INJECTION, SUSPENSION INTRA-ARTICULAR; INTRAMUSCULAR at 10:02

## 2022-02-02 NOTE — PROCEDURES
Large Joint Aspiration/Injection: R subacromial bursa    Date/Time: 2/2/2022 10:45 AM  Performed by: Martin Vo MD  Authorized by: Martin Vo MD     Consent Done?:  Yes (Verbal)  Indications:  Pain  Site marked: the procedure site was marked    Timeout: prior to procedure the correct patient, procedure, and site was verified    Prep: patient was prepped and draped in usual sterile fashion      Local anesthesia used?: Yes    Local anesthetic:  Lidocaine 1% without epinephrine  Ultrasonic Guidance for needle placement?: No    Location:  Shoulder  Site:  R subacromial bursa  Medications:  40 mg triamcinolone acetonide 40 mg/mL  Patient tolerance:  Patient tolerated the procedure well with no immediate complications  Large Joint Aspiration/Injection: L subacromial bursa    Date/Time: 2/2/2022 10:45 AM  Performed by: Martin Vo MD  Authorized by: Martin Vo MD     Consent Done?:  Yes (Verbal)  Indications:  Pain  Site marked: the procedure site was marked    Timeout: prior to procedure the correct patient, procedure, and site was verified    Prep: patient was prepped and draped in usual sterile fashion      Local anesthesia used?: Yes    Local anesthetic:  Lidocaine 1% without epinephrine  Ultrasonic Guidance for needle placement?: No    Location:  Shoulder  Site:  L subacromial bursa  Medications:  40 mg triamcinolone acetonide 40 mg/mL  Patient tolerance:  Patient tolerated the procedure well with no immediate complications

## 2022-02-02 NOTE — PROGRESS NOTES
Subjective:       Patient ID: Trino Head is a 57 y.o. male.    Chief Complaint: Pain of the Left Shoulder (Patient is having b/l shoulder pain, this pain started about six weeks ago, the left shoulder is worse than the right, limited ROM in the left shoulder.) and Pain of the Right Shoulder      History of Present Illness    Prior to meeting with the patient I reviewed the medical chart in [facility]. This included reviewing the previous progress notes from our office, review of the patient's last appointment with their primary care provider, review of any visits to the emergency room, and review of any pain management appointments or procedures.   Patient is here today chief complaint of bilateral shoulder pain left greater than right that started over the last few months.  Denies any acute injury or incident but works daily in a labor-intensive career.  Denies any previous history of cervical or shoulder injury.  Denies any upper extremity radiculitis or numbness or tingling.    Current Medications  Current Outpatient Medications   Medication Sig Dispense Refill    ascorbic acid, vitamin C, (VITAMIN C) 1000 MG tablet Take 1,000 mg by mouth once daily.      aspirin (ECOTRIN) 81 MG EC tablet Take 81 mg by mouth once daily.      cholecalciferol, vitamin D3, (VITAMIN D3) 25 mcg (1,000 unit) capsule Take 1,000 Units by mouth once daily.      cyanocobalamin (VITAMIN B-12) 1000 MCG tablet Take 100 mcg by mouth once daily.      flaxseed oil 1,000 mg Cap Take by mouth.      ginkgo biloba 60 mg Cap Take by mouth.      gluc hull/chondro hull A/vit C/Mn (GLUCOSAMINE 1500 COMPLEX ORAL) Take by mouth.      multivit-min/FA/lycopen/lutein (CENTRUM SILVER MEN ORAL) Take by mouth.      omega 3-dha-epa-fish oil 1,000 mg (120 mg-180 mg) Cap Take by mouth.      oxaprozin (DAYPRO) 600 mg tablet TAKE 1 TABLET BY MOUTH TWICE A DAY 60 tablet 0    tiZANidine (ZANAFLEX) 4 MG tablet TAKE 1 TABLET (4 MG TOTAL) BY MOUTH 2 (TWO) TIMES  DAILY. FOR 20 DAYS      TURMERIC ORAL Take by mouth.      vitamin E 400 UNIT capsule Take 400 Units by mouth once daily.       No current facility-administered medications for this visit.       Allergies  Review of patient's allergies indicates:  No Known Allergies    Past Medical History  History reviewed. No pertinent past medical history.    Surgical History  Past Surgical History:   Procedure Laterality Date    COLONOSCOPY  2016    Dr. Randolph 10 years    VASECTOMY  2012       Family History:   History reviewed. No pertinent family history.    Social History:   Social History     Socioeconomic History    Marital status:    Tobacco Use    Smoking status: Never Smoker    Smokeless tobacco: Never Used   Substance and Sexual Activity    Alcohol use: Yes     Alcohol/week: 1.0 standard drink     Types: 1 Cans of beer per week    Drug use: Never    Sexual activity: Yes     Partners: Female     Birth control/protection: None     Social Determinants of Health     Financial Resource Strain: Unknown    Difficulty of Paying Living Expenses: Patient refused   Food Insecurity: Unknown    Worried About Running Out of Food in the Last Year: Patient refused    Ran Out of Food in the Last Year: Patient refused   Transportation Needs: Unknown    Lack of Transportation (Medical): Patient refused    Lack of Transportation (Non-Medical): Patient refused   Physical Activity: Insufficiently Active    Days of Exercise per Week: 3 days    Minutes of Exercise per Session: 30 min   Stress: No Stress Concern Present    Feeling of Stress : Only a little   Social Connections: Unknown    Frequency of Communication with Friends and Family: Patient refused    Frequency of Social Gatherings with Friends and Family: Patient refused    Active Member of Clubs or Organizations: Patient refused    Attends Club or Organization Meetings: Patient refused    Marital Status:        Hospitalization/Major Diagnostic  Procedure:     Review of Systems     General/Constitutional:  Chills denies. Fatigue denies. Fever denies. Weight gain denies. Weight loss denies.    Respiratory:  Shortness of breath denies.    Cardiovascular:  Chest pain denies.    Gastrointestinal:  Constipation denies. Diarrhea denies. Nausea denies. Vomiting denies.     Hematology:  Easy bruising denies. Prolonged bleeding denies.     Genitourinary:  Frequent urination denies. Pain in lower back denies. Painful urination denies.     Musculoskeletal:  See HPI for details    Skin:  Rash denies.    Neurologic:  Dizziness denies. Gait abnormalities denies. Seizures denies. Tingling/Numbess denies.    Psychiatric:  Anxiety denies. Depressed mood denies.     Objective:   Vital Signs: There were no vitals filed for this visit.     Physical Exam      General Examination:     Constitutional: The patient is alert and oriented to lace person and time. Mood is pleasant.     Head/Face: Normal facial features normal eyebrows    Eyes: Normal extraocular motion bilaterally    Lungs: Respirations are equal and unlabored    Gait is coordinated.    Cardiovascular: There are no swelling or varicosities present.    Lymphatic: Negative for adenopathy    Skin: Normal    Neurological: Level of consciousness normal. Oriented to place person and time and situation    Psychiatric: Oriented to time place person and situation    Bilateral shoulder exam demonstrates mildly limited active range of motion flexion and abduction as well as internal rotation with a positive Dickerson bilateral.  XRAY Report/ Interpretation:  Bilateral shoulder x-rays AP and axillary view taken in the office today and reviewed the patient demonstrate mild to moderate AC joint hypertrophy worse on the right than on the left.      Assessment:       1. Acute pain of both shoulders    2. Shoulder impingement syndrome, left    3. Shoulder impingement syndrome, right        Plan:       Trino was seen today for pain and  "pain.    Diagnoses and all orders for this visit:    Acute pain of both shoulders  -     X-Ray Shoulder 2 or more views Bilat    Shoulder impingement syndrome, left    Shoulder impingement syndrome, right         No follow-ups on file.  Titus Perez, physician's assistant served in the capacity as a "scribe" for this patient encounter  A "face to face" encounter occurred with Dr. Vo on this date  The treatment plan and medical decision making is outlined below:  Today the patient was given bilateral shoulder subacromial injections each with 40 mg of Kenalog and 3 cc lidocaine.  Please see procedure report for details.  Hopefully this will resolve his symptoms.  If it does not I have instructed the patient to call me in 2 weeks and we will order physical therapy.  Otherwise follow-up p.r.n..    Treatment options were discussed with regards to the nature of the medical condition. Conservative pain intervention and surgical options were discussed in detail. The probability of success of each separate treatment option was discussed. The patient expressed a clear understanding of the treatment options. With regards to surgery, the procedure risk, benefits, complications, and outcomes were discussed. No guarantees were given with regards to surgical outcome.   The risk of complications, morbidity, and mortality of patient management decisions have been made at the time of this visit. These are associated with the patient's problems, diagnostic procedures and treatment options. This includes the possible management options selected and those considered but not selected by the patient after shared medical decision making we discussed with the patient.     This note was created using Dragon voice recognition software that occasionally misinterpreted phrases or words.    "

## 2022-05-25 ENCOUNTER — PATIENT MESSAGE (OUTPATIENT)
Dept: FAMILY MEDICINE | Facility: CLINIC | Age: 57
End: 2022-05-25

## 2022-05-25 ENCOUNTER — TELEPHONE (OUTPATIENT)
Dept: FAMILY MEDICINE | Facility: CLINIC | Age: 57
End: 2022-05-25

## 2022-05-25 DIAGNOSIS — Z79.899 ENCOUNTER FOR LONG-TERM (CURRENT) USE OF OTHER MEDICATIONS: Primary | ICD-10-CM

## 2022-05-25 DIAGNOSIS — N20.0 NEPHROLITHIASIS: ICD-10-CM

## 2022-05-25 DIAGNOSIS — Z00.00 ROUTINE GENERAL MEDICAL EXAMINATION AT A HEALTH CARE FACILITY: ICD-10-CM

## 2022-05-25 DIAGNOSIS — E78.00 PURE HYPERCHOLESTEROLEMIA: ICD-10-CM

## 2022-05-28 LAB
ALBUMIN SERPL-MCNC: 3.9 G/DL (ref 3.6–5.1)
ALBUMIN/GLOB SERPL: 1.9 (CALC) (ref 1–2.5)
ALP SERPL-CCNC: 41 U/L (ref 35–144)
ALT SERPL-CCNC: 15 U/L (ref 9–46)
APPEARANCE UR: CLEAR
AST SERPL-CCNC: 18 U/L (ref 10–35)
BACTERIA #/AREA URNS HPF: ABNORMAL /HPF
BACTERIA UR CULT: ABNORMAL
BASOPHILS # BLD AUTO: 90 CELLS/UL (ref 0–200)
BASOPHILS NFR BLD AUTO: 2.2 %
BILIRUB SERPL-MCNC: 0.4 MG/DL (ref 0.2–1.2)
BILIRUB UR QL STRIP: NEGATIVE
BUN SERPL-MCNC: 17 MG/DL (ref 7–25)
BUN/CREAT SERPL: ABNORMAL (CALC) (ref 6–22)
CALCIUM SERPL-MCNC: 9.3 MG/DL (ref 8.6–10.3)
CHLORIDE SERPL-SCNC: 106 MMOL/L (ref 98–110)
CHOLEST SERPL-MCNC: 222 MG/DL
CHOLEST/HDLC SERPL: 3.5 (CALC)
CO2 SERPL-SCNC: 29 MMOL/L (ref 20–32)
COLOR UR: YELLOW
CREAT SERPL-MCNC: 0.85 MG/DL (ref 0.7–1.33)
EOSINOPHIL # BLD AUTO: 70 CELLS/UL (ref 15–500)
EOSINOPHIL NFR BLD AUTO: 1.7 %
ERYTHROCYTE [DISTWIDTH] IN BLOOD BY AUTOMATED COUNT: 12.5 % (ref 11–15)
GLOBULIN SER CALC-MCNC: 2.1 G/DL (CALC) (ref 1.9–3.7)
GLUCOSE SERPL-MCNC: 92 MG/DL (ref 65–99)
GLUCOSE UR QL STRIP: NEGATIVE
HCT VFR BLD AUTO: 44.3 % (ref 38.5–50)
HDLC SERPL-MCNC: 63 MG/DL
HGB BLD-MCNC: 14.9 G/DL (ref 13.2–17.1)
HGB UR QL STRIP: NEGATIVE
HYALINE CASTS #/AREA URNS LPF: ABNORMAL /LPF
KETONES UR QL STRIP: ABNORMAL
LDLC SERPL CALC-MCNC: 142 MG/DL (CALC)
LEUKOCYTE ESTERASE UR QL STRIP: NEGATIVE
LYMPHOCYTES # BLD AUTO: 1538 CELLS/UL (ref 850–3900)
LYMPHOCYTES NFR BLD AUTO: 37.5 %
MCH RBC QN AUTO: 32.6 PG (ref 27–33)
MCHC RBC AUTO-ENTMCNC: 33.6 G/DL (ref 32–36)
MCV RBC AUTO: 96.9 FL (ref 80–100)
MONOCYTES # BLD AUTO: 410 CELLS/UL (ref 200–950)
MONOCYTES NFR BLD AUTO: 10 %
NEUTROPHILS # BLD AUTO: 1993 CELLS/UL (ref 1500–7800)
NEUTROPHILS NFR BLD AUTO: 48.6 %
NITRITE UR QL STRIP: NEGATIVE
NONHDLC SERPL-MCNC: 159 MG/DL (CALC)
PH UR STRIP: 7 [PH] (ref 5–8)
PLATELET # BLD AUTO: 279 THOUSAND/UL (ref 140–400)
PMV BLD REES-ECKER: 10.4 FL (ref 7.5–12.5)
POTASSIUM SERPL-SCNC: 4.2 MMOL/L (ref 3.5–5.3)
PROT SERPL-MCNC: 6 G/DL (ref 6.1–8.1)
PROT UR QL STRIP: NEGATIVE
RBC # BLD AUTO: 4.57 MILLION/UL (ref 4.2–5.8)
RBC #/AREA URNS HPF: ABNORMAL /HPF
SODIUM SERPL-SCNC: 140 MMOL/L (ref 135–146)
SP GR UR STRIP: 1.02 (ref 1–1.03)
SQUAMOUS #/AREA URNS HPF: ABNORMAL /HPF
TRIGL SERPL-MCNC: 78 MG/DL
TSH SERPL-ACNC: 2.45 MIU/L (ref 0.4–4.5)
WBC # BLD AUTO: 4.1 THOUSAND/UL (ref 3.8–10.8)
WBC #/AREA URNS HPF: ABNORMAL /HPF

## 2022-06-08 ENCOUNTER — OFFICE VISIT (OUTPATIENT)
Dept: FAMILY MEDICINE | Facility: CLINIC | Age: 57
End: 2022-06-08
Payer: COMMERCIAL

## 2022-06-08 VITALS
HEIGHT: 69 IN | BODY MASS INDEX: 31.84 KG/M2 | DIASTOLIC BLOOD PRESSURE: 100 MMHG | WEIGHT: 215 LBS | SYSTOLIC BLOOD PRESSURE: 170 MMHG | HEART RATE: 64 BPM

## 2022-06-08 DIAGNOSIS — K21.9 GASTROESOPHAGEAL REFLUX DISEASE WITHOUT ESOPHAGITIS: ICD-10-CM

## 2022-06-08 DIAGNOSIS — E78.00 PURE HYPERCHOLESTEROLEMIA: ICD-10-CM

## 2022-06-08 DIAGNOSIS — G25.2 INTENTION TREMOR: ICD-10-CM

## 2022-06-08 DIAGNOSIS — I10 PRIMARY HYPERTENSION: ICD-10-CM

## 2022-06-08 DIAGNOSIS — Z00.00 WELLNESS EXAMINATION: Primary | ICD-10-CM

## 2022-06-08 DIAGNOSIS — N20.0 NEPHROLITHIASIS: ICD-10-CM

## 2022-06-08 PROCEDURE — 3008F PR BODY MASS INDEX (BMI) DOCUMENTED: ICD-10-PCS | Mod: CPTII,S$GLB,, | Performed by: FAMILY MEDICINE

## 2022-06-08 PROCEDURE — 3080F DIAST BP >= 90 MM HG: CPT | Mod: CPTII,S$GLB,, | Performed by: FAMILY MEDICINE

## 2022-06-08 PROCEDURE — 99396 PR PREVENTIVE VISIT,EST,40-64: ICD-10-PCS | Mod: S$GLB,,, | Performed by: FAMILY MEDICINE

## 2022-06-08 PROCEDURE — 3077F PR MOST RECENT SYSTOLIC BLOOD PRESSURE >= 140 MM HG: ICD-10-PCS | Mod: CPTII,S$GLB,, | Performed by: FAMILY MEDICINE

## 2022-06-08 PROCEDURE — 99396 PREV VISIT EST AGE 40-64: CPT | Mod: S$GLB,,, | Performed by: FAMILY MEDICINE

## 2022-06-08 PROCEDURE — 1159F MED LIST DOCD IN RCRD: CPT | Mod: CPTII,S$GLB,, | Performed by: FAMILY MEDICINE

## 2022-06-08 PROCEDURE — 3008F BODY MASS INDEX DOCD: CPT | Mod: CPTII,S$GLB,, | Performed by: FAMILY MEDICINE

## 2022-06-08 PROCEDURE — 3080F PR MOST RECENT DIASTOLIC BLOOD PRESSURE >= 90 MM HG: ICD-10-PCS | Mod: CPTII,S$GLB,, | Performed by: FAMILY MEDICINE

## 2022-06-08 PROCEDURE — 1159F PR MEDICATION LIST DOCUMENTED IN MEDICAL RECORD: ICD-10-PCS | Mod: CPTII,S$GLB,, | Performed by: FAMILY MEDICINE

## 2022-06-08 PROCEDURE — 3077F SYST BP >= 140 MM HG: CPT | Mod: CPTII,S$GLB,, | Performed by: FAMILY MEDICINE

## 2022-06-08 NOTE — PROGRESS NOTES
SUBJECTIVE:    Patient ID: Trino Head is a 57 y.o. male.    Chief Complaint: Annual Exam (No bottles // shoulder pain // sinus problem since two weeks // hands shaking // abc  )    57-year-old male works for  [employer]  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  He complains of left shoulder pain and had a recent cortisone shot by Dr. Perez.  Dr. Vo has him on Daypro 600 mg b.i.d. for back pain and sciatica which is intermittent.    Patient's blood pressure is rising quite high which is unusual for him.  170/100.  He has been eating ham sandwiches, chips, peanuts which are quite salty.  He drinks water and soft drinks during the day.    He notes some mild fine intention tremor.  Currently not on medication for this.    2016-colonoscopy Dr. Chauhan-RTC 10 years.    S/p 2 COVID booster shots      Telephone on 05/25/2022   Component Date Value Ref Range Status    WBC 05/27/2022 4.1  3.8 - 10.8 Thousand/uL Final    RBC 05/27/2022 4.57  4.20 - 5.80 Million/uL Final    Hemoglobin 05/27/2022 14.9  13.2 - 17.1 g/dL Final    Hematocrit 05/27/2022 44.3  38.5 - 50.0 % Final    MCV 05/27/2022 96.9  80.0 - 100.0 fL Final    MCH 05/27/2022 32.6  27.0 - 33.0 pg Final    MCHC 05/27/2022 33.6  32.0 - 36.0 g/dL Final    RDW 05/27/2022 12.5  11.0 - 15.0 % Final    Platelets 05/27/2022 279  140 - 400 Thousand/uL Final    MPV 05/27/2022 10.4  7.5 - 12.5 fL Final    Neutrophils, Abs 05/27/2022 1,993  1,500 - 7,800 cells/uL Final    Lymph # 05/27/2022 1,538  850 - 3,900 cells/uL Final    Mono # 05/27/2022 410  200 - 950 cells/uL Final    Eos # 05/27/2022 70  15 - 500 cells/uL Final    Baso # 05/27/2022 90  0 - 200 cells/uL Final    Neutrophils Relative 05/27/2022 48.6  % Final    Lymph % 05/27/2022 37.5  % Final    Mono % 05/27/2022 10.0  % Final    Eosinophil % 05/27/2022 1.7  % Final    Basophil % 05/27/2022 2.2  % Final    Glucose 05/27/2022 92  65 - 99 mg/dL Final    BUN  05/27/2022 17  7 - 25 mg/dL Final    Creatinine 05/27/2022 0.85  0.70 - 1.33 mg/dL Final    eGFR if non African American 05/27/2022 97  > OR = 60 mL/min/1.73m2 Final    eGFR if  05/27/2022 112  > OR = 60 mL/min/1.73m2 Final    BUN/Creatinine Ratio 05/27/2022 NOT APPLICABLE  6 - 22 (calc) Final    Sodium 05/27/2022 140  135 - 146 mmol/L Final    Potassium 05/27/2022 4.2  3.5 - 5.3 mmol/L Final    Chloride 05/27/2022 106  98 - 110 mmol/L Final    CO2 05/27/2022 29  20 - 32 mmol/L Final    Calcium 05/27/2022 9.3  8.6 - 10.3 mg/dL Final    Total Protein 05/27/2022 6.0 (A) 6.1 - 8.1 g/dL Final    Albumin 05/27/2022 3.9  3.6 - 5.1 g/dL Final    Globulin, Total 05/27/2022 2.1  1.9 - 3.7 g/dL (calc) Final    Albumin/Globulin Ratio 05/27/2022 1.9  1.0 - 2.5 (calc) Final    Total Bilirubin 05/27/2022 0.4  0.2 - 1.2 mg/dL Final    Alkaline Phosphatase 05/27/2022 41  35 - 144 U/L Final    AST 05/27/2022 18  10 - 35 U/L Final    ALT 05/27/2022 15  9 - 46 U/L Final    Cholesterol 05/27/2022 222 (A) <200 mg/dL Final    HDL 05/27/2022 63  > OR = 40 mg/dL Final    Triglycerides 05/27/2022 78  <150 mg/dL Final    LDL Cholesterol 05/27/2022 142 (A) mg/dL (calc) Final    HDL/Cholesterol Ratio 05/27/2022 3.5  <5.0 (calc) Final    Non HDL Chol. (LDL+VLDL) 05/27/2022 159 (A) <130 mg/dL (calc) Final    TSH 05/27/2022 2.45  0.40 - 4.50 mIU/L Final    Color, UA 05/27/2022 YELLOW  YELLOW Final    Appearance, UA 05/27/2022 CLEAR  CLEAR Final    Specific [place], UA 05/27/2022 1.024  1.001 - 1.035 Final    pH, UA 05/27/2022 7.0  5.0 - 8.0 Final    Glucose, UA 05/27/2022 NEGATIVE  NEGATIVE Final    Bilirubin, UA 05/27/2022 NEGATIVE  NEGATIVE Final    Ketones, UA 05/27/2022 TRACE (A) NEGATIVE Final    Occult Blood UA 05/27/2022 NEGATIVE  NEGATIVE Final    Protein, UA 05/27/2022 NEGATIVE  NEGATIVE Final    Nitrite, UA 05/27/2022 NEGATIVE  NEGATIVE Final    Leukocytes, UA 05/27/2022 NEGATIVE   NEGATIVE Final    WBC Casts, UA 05/27/2022 NONE SEEN  < OR = 5 /HPF Final    RBC Casts, UA 05/27/2022 NONE SEEN  < OR = 2 /HPF Final    Squam Epithel, UA 05/27/2022 NONE SEEN  < OR = 5 /HPF Final    Bacteria, UA 05/27/2022 NONE SEEN  NONE SEEN /HPF Final    Hyaline Casts, UA 05/27/2022 NONE SEEN  NONE SEEN /LPF Final    Reflexive Urine Culture 05/27/2022    Final       No past medical history on file.  Social History     Socioeconomic History    Marital status:    Tobacco Use    Smoking status: Never Smoker    Smokeless tobacco: Never Used   Substance and Sexual Activity    Alcohol use: Yes     Alcohol/week: 1.0 standard drink     Types: 1 Cans of beer per week    Drug use: Never    Sexual activity: Yes     Partners: Female     Birth control/protection: None     Social Determinants of Health     Financial Resource Strain: Unknown    Difficulty of Paying Living Expenses: Patient refused   Food Insecurity: Unknown    Worried About Running Out of Food in the Last Year: Patient refused    Ran Out of Food in the Last Year: Patient refused   Transportation Needs: Unknown    Lack of Transportation (Medical): Patient refused    Lack of Transportation (Non-Medical): Patient refused   Physical Activity: Insufficiently Active    Days of Exercise per Week: 3 days    Minutes of Exercise per Session: 30 min   Stress: No Stress Concern Present    Feeling of Stress : Only a little   Social Connections: Unknown    Frequency of Communication with Friends and Family: Patient refused    Frequency of Social Gatherings with Friends and Family: Patient refused    Active Member of Clubs or Organizations: Patient refused    Attends Club or Organization Meetings: Patient refused    Marital Status:      Past Surgical History:   Procedure Laterality Date    COLONOSCOPY  2016    Dr. Chauhan-RT 10 years    VASECTOMY  2012     No family history on file.    Review of patient's allergies indicates:  No  Known Allergies    Current Outpatient Medications:     ascorbic acid, vitamin C, (VITAMIN C) 1000 MG tablet, Take 1,000 mg by mouth once daily., Disp: , Rfl:     aspirin (ECOTRIN) 81 MG EC tablet, Take 81 mg by mouth once daily., Disp: , Rfl:     cholecalciferol, vitamin D3, (VITAMIN D3) 25 mcg (1,000 unit) capsule, Take 1,000 Units by mouth once daily., Disp: , Rfl:     cyanocobalamin (VITAMIN B-12) 1000 MCG tablet, Take 100 mcg by mouth once daily., Disp: , Rfl:     flaxseed oil 1,000 mg Cap, Take by mouth., Disp: , Rfl:     ginkgo biloba 60 mg Cap, Take by mouth., Disp: , Rfl:     gluc hull/chondro hull A/vit C/Mn (GLUCOSAMINE 1500 COMPLEX ORAL), Take by mouth., Disp: , Rfl:     multivit-min/FA/lycopen/lutein (CENTRUM SILVER MEN ORAL), Take by mouth., Disp: , Rfl:     omega 3-dha-epa-fish oil 1,000 mg (120 mg-180 mg) Cap, Take by mouth., Disp: , Rfl:     oxaprozin (DAYPRO) 600 mg tablet, TAKE 1 TABLET BY MOUTH TWICE A DAY, Disp: 60 tablet, Rfl: 0    tiZANidine (ZANAFLEX) 4 MG tablet, TAKE 1 TABLET (4 MG TOTAL) BY MOUTH 2 (TWO) TIMES DAILY. FOR 20 DAYS, Disp: , Rfl:     TURMERIC ORAL, Take by mouth., Disp: , Rfl:     vitamin E 400 UNIT capsule, Take 400 Units by mouth once daily., Disp: , Rfl:     propranoloL (INNOPRAN XL) 80 mg 24 hr capsule, Take 1 capsule (80 mg total) by mouth every evening., Disp: 30 capsule, Rfl: 3    Review of Systems   Constitutional: Negative for appetite change, chills, fatigue, fever and unexpected weight change.   HENT: Negative for congestion, ear pain and trouble swallowing.    Eyes: Negative for pain, discharge and visual disturbance.   Respiratory: Negative for apnea, cough, shortness of breath and wheezing.    Cardiovascular: Negative for chest pain and leg swelling.   Gastrointestinal: Negative for abdominal pain, blood in stool, constipation, diarrhea, nausea and vomiting.        Gerd , uses Tums , worse  At  night   Endocrine: Negative for cold intolerance, heat  "intolerance and polydipsia.   Genitourinary: Negative for dysuria, hematuria, testicular pain and urgency.        Nocturia 2 x  nite   Musculoskeletal: Positive for arthralgias (left  shoulder , rt knee aches), back pain and gait problem (occas  limping). Negative for joint swelling and myalgias.   Neurological: Positive for headaches (occas  HA's in afternoon). Negative for dizziness, seizures and numbness.   Psychiatric/Behavioral: Positive for sleep disturbance. Negative for agitation, behavioral problems and hallucinations. The patient is not nervous/anxious.           Objective:      Vitals:    06/08/22 0841 06/08/22 0842 06/08/22 2050   BP: (!) 180/110 (!) 180/106 (!) 170/100   Pulse: 64     Weight: 97.5 kg (215 lb)     Height: 5' 9" (1.753 m)       Physical Exam  Vitals and nursing note reviewed.   Constitutional:       General: He is not in acute distress.     Appearance: He is well-developed. He is obese. He is not toxic-appearing.   HENT:      Head: Normocephalic and atraumatic.      Right Ear: Tympanic membrane and external ear normal.      Left Ear: Tympanic membrane and external ear normal.      Nose: Nose normal.      Mouth/Throat:      Pharynx: Oropharynx is clear.   Eyes:      Pupils: Pupils are equal, round, and reactive to light.   Neck:      Thyroid: No thyromegaly.      Vascular: No carotid bruit.   Cardiovascular:      Rate and Rhythm: Normal rate and regular rhythm.      Heart sounds: Normal heart sounds. No murmur heard.  Pulmonary:      Effort: Pulmonary effort is normal.      Breath sounds: Normal breath sounds. No wheezing or rales.   Abdominal:      General: Bowel sounds are normal. There is no distension.      Palpations: Abdomen is soft.      Tenderness: There is no abdominal tenderness.   Musculoskeletal:         General: No tenderness or deformity. Normal range of motion.      Cervical back: Normal range of motion and neck supple.      Lumbar back: Normal. No spasms.      Comments: " Bends 90 degrees at  Waist, left shoulder decreased flexion to 120°, decreased internal rotation.  Knees are good range of motion without crepitance no pitting edema to lower extremities   Lymphadenopathy:      Cervical: No cervical adenopathy.   Skin:     General: Skin is warm and dry.      Findings: No rash.   Neurological:      Mental Status: He is alert and oriented to person, place, and time.      Cranial Nerves: No cranial nerve deficit.      Coordination: Coordination normal.   Psychiatric:         Behavior: Behavior normal.         Thought Content: Thought content normal.         Judgment: Judgment normal.           Assessment:       1. Wellness examination    2. Primary hypertension    3. Pure hypercholesterolemia    4. Nephrolithiasis    5. Intention tremor    6. Gastroesophageal reflux disease without esophagitis         Plan:       Primary hypertension  -     propranoloL (INNOPRAN XL) 80 mg 24 hr capsule; Take 1 capsule (80 mg total) by mouth every evening.  Dispense: 30 capsule; Refill: 3  Will add propanolol 80 mg daily for blood pressure and tremor, keep a blood pressure diary for 2 weeks, RTC 2 weeks for nurse blood pressure visit  Pure hypercholesterolemia  Cholesterol 222 HDL 63 TG 78  mildly elevated.  Nephrolithiasis  Asymptomatic  Intention tremor  Fine tremor  Gastroesophageal reflux disease without esophagitis  add omeprazole 20 mg daily    Follow up in about 2 weeks (around 6/22/2022), or 3 month HTN- Ziyad, for BP Check-Up with Nurse.        6/8/2022 Titus Graham

## 2022-06-10 ENCOUNTER — TELEPHONE (OUTPATIENT)
Dept: FAMILY MEDICINE | Facility: CLINIC | Age: 57
End: 2022-06-10

## 2022-06-10 DIAGNOSIS — G25.2 INTENTION TREMOR: Primary | ICD-10-CM

## 2022-06-10 RX ORDER — PROPRANOLOL HYDROCHLORIDE 40 MG/1
80 TABLET ORAL DAILY
Qty: 60 TABLET | Refills: 3 | Status: SHIPPED | OUTPATIENT
Start: 2022-06-10 | End: 2022-08-07 | Stop reason: SDUPTHER

## 2022-06-10 NOTE — TELEPHONE ENCOUNTER
Spoke with pt in regards message sent. Verbalized per Mahendra, being that it is the extended release of Propanolol, this may be the issue. Mahendra will send in a script for Propanolol 40mg, take 2 tablets once daily. This Rx has been sent to his pharmacy. Pt acknowledge understanding.

## 2022-06-10 NOTE — TELEPHONE ENCOUNTER
I am not familiar with this patient and did not see him last.  Being that it is the extended release of Propanolol, this may be the issue.  I will send in a script for Propanolol 40mg, take 2 tablets once daily. This Rx has been sent to his pharmacy.

## 2022-06-10 NOTE — TELEPHONE ENCOUNTER
----- Message from Sunita Brown sent at 6/10/2022 11:22 AM CDT -----  Pt calling said [facility] has to talk to someone in the office to clarify the script and may need an alternative since the way it was sent over they do not have a comparable generic to fill as is would cost the pt $2,700.  They do not have any recommended alternates just asking the nurse to call to Malia at he pharm. Pt would like this resolved before the weekend.   [phone]

## 2022-06-13 ENCOUNTER — PATIENT MESSAGE (OUTPATIENT)
Dept: FAMILY MEDICINE | Facility: CLINIC | Age: 57
End: 2022-06-13

## 2022-06-15 ENCOUNTER — OFFICE VISIT (OUTPATIENT)
Dept: ORTHOPEDICS | Facility: CLINIC | Age: 57
End: 2022-06-15
Payer: COMMERCIAL

## 2022-06-15 VITALS — WEIGHT: 215 LBS | HEIGHT: 69 IN | BODY MASS INDEX: 31.84 KG/M2

## 2022-06-15 DIAGNOSIS — M75.41 SHOULDER IMPINGEMENT SYNDROME, RIGHT: Primary | ICD-10-CM

## 2022-06-15 DIAGNOSIS — M17.11 PRIMARY OSTEOARTHRITIS OF RIGHT KNEE: ICD-10-CM

## 2022-06-15 DIAGNOSIS — M75.42 SHOULDER IMPINGEMENT SYNDROME, LEFT: ICD-10-CM

## 2022-06-15 PROCEDURE — 20610 DRAIN/INJ JOINT/BURSA W/O US: CPT | Mod: 51,RT,S$GLB, | Performed by: ORTHOPAEDIC SURGERY

## 2022-06-15 PROCEDURE — 20610 LARGE JOINT ASPIRATION/INJECTION: R KNEE: ICD-10-PCS | Mod: 51,RT,S$GLB, | Performed by: ORTHOPAEDIC SURGERY

## 2022-06-15 PROCEDURE — 3008F BODY MASS INDEX DOCD: CPT | Mod: CPTII,S$GLB,, | Performed by: ORTHOPAEDIC SURGERY

## 2022-06-15 PROCEDURE — 1160F PR REVIEW ALL MEDS BY PRESCRIBER/CLIN PHARMACIST DOCUMENTED: ICD-10-PCS | Mod: CPTII,S$GLB,, | Performed by: ORTHOPAEDIC SURGERY

## 2022-06-15 PROCEDURE — 99213 OFFICE O/P EST LOW 20 MIN: CPT | Mod: 25,S$GLB,, | Performed by: ORTHOPAEDIC SURGERY

## 2022-06-15 PROCEDURE — 3008F PR BODY MASS INDEX (BMI) DOCUMENTED: ICD-10-PCS | Mod: CPTII,S$GLB,, | Performed by: ORTHOPAEDIC SURGERY

## 2022-06-15 PROCEDURE — 1159F MED LIST DOCD IN RCRD: CPT | Mod: CPTII,S$GLB,, | Performed by: ORTHOPAEDIC SURGERY

## 2022-06-15 PROCEDURE — 20610 DRAIN/INJ JOINT/BURSA W/O US: CPT | Mod: 50,S$GLB,, | Performed by: ORTHOPAEDIC SURGERY

## 2022-06-15 PROCEDURE — 99213 PR OFFICE/OUTPT VISIT, EST, LEVL III, 20-29 MIN: ICD-10-PCS | Mod: 25,S$GLB,, | Performed by: ORTHOPAEDIC SURGERY

## 2022-06-15 PROCEDURE — 1159F PR MEDICATION LIST DOCUMENTED IN MEDICAL RECORD: ICD-10-PCS | Mod: CPTII,S$GLB,, | Performed by: ORTHOPAEDIC SURGERY

## 2022-06-15 PROCEDURE — 1160F RVW MEDS BY RX/DR IN RCRD: CPT | Mod: CPTII,S$GLB,, | Performed by: ORTHOPAEDIC SURGERY

## 2022-06-15 RX ORDER — TRIAMCINOLONE ACETONIDE 40 MG/ML
40 INJECTION, SUSPENSION INTRA-ARTICULAR; INTRAMUSCULAR
Status: DISCONTINUED | OUTPATIENT
Start: 2022-06-15 | End: 2022-06-15 | Stop reason: HOSPADM

## 2022-06-15 RX ADMIN — TRIAMCINOLONE ACETONIDE 40 MG: 40 INJECTION, SUSPENSION INTRA-ARTICULAR; INTRAMUSCULAR at 03:06

## 2022-06-15 NOTE — PROGRESS NOTES
Subjective:       Patient ID: Trino Head is a 57 y.o. male.    Chief Complaint: Pain of the Right Knee (Right knee x years, limping, pain comes and goes, no swelling, no giving way), Pain of the Right Shoulder (Had injections to b/l shoulders in 2/2/22, would like to get injections again today, limited and painful ROM), and Pain of the Left Shoulder      History of Present Illness    Prior to meeting with the patient I reviewed the medical chart in [facility]. This included reviewing the previous progress notes from our office, review of the patient's last appointment with their primary care provider, review of any visits to the emergency room, and review of any pain management appointments or procedures.     Román comes in today for follow-up for his bilateral shoulder impingement syndrome and also new complaint of left knee pain.  He was last seen and injected his bilateral shoulders approximally 4 months ago with great relief of his symptoms.  Unfortunately pain has begun to return over the past several weeks.  He is interested in repeat corticosteroid injections in both of his shoulders.  He does report left greater than right shoulder pain.  He is right-hand dominant.    Current Medications  Current Outpatient Medications   Medication Sig Dispense Refill    ascorbic acid, vitamin C, (VITAMIN C) 1000 MG tablet Take 1,000 mg by mouth once daily.      aspirin (ECOTRIN) 81 MG EC tablet Take 81 mg by mouth once daily.      cholecalciferol, vitamin D3, (VITAMIN D3) 25 mcg (1,000 unit) capsule Take 1,000 Units by mouth once daily.      cyanocobalamin (VITAMIN B-12) 1000 MCG tablet Take 100 mcg by mouth once daily.      flaxseed oil 1,000 mg Cap Take by mouth.      ginkgo biloba 60 mg Cap Take by mouth.      gluc hull/chondro hull A/vit C/Mn (GLUCOSAMINE 1500 COMPLEX ORAL) Take by mouth.      multivit-min/FA/lycopen/lutein (CENTRUM SILVER MEN ORAL) Take by mouth.      omega 3-dha-epa-fish oil 1,000 mg (120 mg-180  mg) Cap Take by mouth.      propranoloL (INDERAL) 40 MG tablet Take 2 tablets (80 mg total) by mouth once daily. 60 tablet 3    propranoloL (INNOPRAN XL) 80 mg 24 hr capsule Take 1 capsule (80 mg total) by mouth every evening. 30 capsule 3    TURMERIC ORAL Take by mouth.      vitamin E 400 UNIT capsule Take 400 Units by mouth once daily.       No current facility-administered medications for this visit.       Allergies  Review of patient's allergies indicates:  No Known Allergies    Past Medical History  History reviewed. No pertinent past medical history.    Surgical History  Past Surgical History:   Procedure Laterality Date    COLONOSCOPY  2016    Dr. Chauhan-RT 10 years    VASECTOMY  2012       Family History:   History reviewed. No pertinent family history.    Social History:   Social History     Socioeconomic History    Marital status:    Tobacco Use    Smoking status: Never Smoker    Smokeless tobacco: Never Used   Substance and Sexual Activity    Alcohol use: Yes     Alcohol/week: 1.0 standard drink     Types: 1 Cans of beer per week    Drug use: Never    Sexual activity: Yes     Partners: Female     Birth control/protection: None       Hospitalization/Major Diagnostic Procedure:     Review of Systems     General/Constitutional:  Chills denies. Fatigue denies. Fever denies. Weight gain denies. Weight loss denies.    Respiratory:  Shortness of breath denies.    Cardiovascular:  Chest pain denies.    Gastrointestinal:  Constipation denies. Diarrhea denies. Nausea denies. Vomiting denies.     Hematology:  Easy bruising denies. Prolonged bleeding denies.     Genitourinary:  Frequent urination denies. Pain in lower back denies. Painful urination denies.     Musculoskeletal:  See HPI for details    Skin:  Rash denies.    Neurologic:  Dizziness denies. Gait abnormalities denies. Seizures denies. Tingling/Numbess denies.    Psychiatric:  Anxiety denies. Depressed mood denies.     Objective:    Vital Signs: There were no vitals filed for this visit.     Physical Exam      General Examination:     Constitutional: The patient is alert and oriented to lace person and time. Mood is pleasant.     Head/Face: Normal facial features normal eyebrows    Eyes: Normal extraocular motion bilaterally    Lungs: Respirations are equal and unlabored    Gait is coordinated.    Cardiovascular: There are no swelling or varicosities present.    Lymphatic: Negative for adenopathy    Skin: Normal    Neurological: Level of consciousness normal. Oriented to place person and time and situation    Psychiatric: Oriented to time place person and situation    Right knee exam:  Skin to right knee is clean dry and intact.  There is no erythema or ecchymosis.  There are no signs or symptoms of infection.  Patient is neurovascularly intact throughout the right lower extremity.  Right calf is soft and nontender.  He has a negative straight leg raise on the right.  He has well-preserved internal/external rotation of his right hip without pain.  Right knee active range of motion is approximately 0-120 degrees.  Right knee is stable to varus and valgus stresses while held in extension.  He is diffusely tender along the medial aspect of the right knee.  He does have crepitus with range of motioning of the right knee.  He is able weight bear as tolerated on his right lower extremity and has a mildly antalgic gait.    Bilateral shoulder exam:  Skin to bilateral shoulders is clean dry and intact.  There is no erythema or ecchymosis bilaterally.  There are no signs or symptoms of infection bilaterally.  Patient has full active range of motion of the right shoulder with forward flexion, external rotation, internal rotation, and abduction.  He has a negative Neer impingement sign.  He does have a positive Dickerson.  His rotator cuff is grossly intact to resisted muscle testing and is strong.  It is mildly tender for him.  For the left shoulder, he  can forward flex to approximately 160°, external rotate to approximately 75°, and abduct to approximately 120°.  Rotator cuff is strong to resisted muscle testing but tender for him.  He does have a positive Neer impingement sign and a positive Dickerson.  For both shoulders, he does have tenderness to palpation over his acromioclavicular joints and has a positive crossover bilaterally.    XRAY Report/ Interpretation:  Three views were taken of the right knee today:  AP, lateral, and sunrise views.  They reveal no acute fractures or dislocations.  Visualized soft tissues are unremarkable.  Patient does have severe arthrosis in the medial compartment of the right knee with bone-on-bone deformity.    Assessment:       1. Shoulder impingement syndrome, right    2. Primary osteoarthritis of right knee    3. Shoulder impingement syndrome, left        Plan:       Trino was seen today for pain, pain and pain.    Diagnoses and all orders for this visit:    Shoulder impingement syndrome, right  -     Large Joint Aspiration/Injection: bilateral subacromial bursa    Primary osteoarthritis of right knee  -     Cancel: X-Ray Knee 3 View Right  -     X-Ray Knee 1 or 2 View Right  -     Large Joint Aspiration/Injection: R knee    Shoulder impingement syndrome, left  -     Large Joint Aspiration/Injection: bilateral subacromial bursa         Follow up in about 3 months (around 9/15/2022) for b/l shoulder/ right knee injection 06/15/22.  This is to attest that the physician's assistant Mata Kwon served in the capacity as a scribe for this patient encounter.  This is also to verify that I have reviewed the patient's history and helped formulate the treatment plan and discussed it with the physician's assistant .  I have actively participated and evaluation and treatment plan for this patient visit.  The treatment plan and medical decision-making is outlined below:I injected his bilateral shoulders today via a posterior lateral  approach subacromially with 40 mg Kenalog and lidocaine respectively.  Also injected his right knee today via an anterior lateral approach with 40 mg of Kenalog and lidocaine.  He tolerated all 3 of these injections well.  I did have a long discussion with him today about the severity osteoarthritis in his right knee and definitive treatment recommendation at some point in the future would be a total knee arthroplasty.  However, due to his young age exhausting all conservative treatment measures is reasonable.  I did discuss with him viscosupplementation as well.  For his bilateral shoulders, he is most symptomatic with his left 1.  He does have a type 2 acromion on imaging.  He also has a type 2 acromion on the right shoulder as well.  He has AC joint arthrosis on both shoulders as well.  I did discuss with him arthroscopic intervention with subacromial decompressions and distal clavicle resections.  I do not believe he has any rotator cuff pathology based off his physical exam.  However, this is not absolute.  Would like to see him back in the next 2-3 months to see how he is doing with these injections for his knee and shoulders.  Depending on how he is doing, with guide with the next treatment step/recommendation would be.    Treatment options were discussed with regards to the nature of the medical condition. Conservative pain intervention and surgical options were discussed in detail. The probability of success of each separate treatment option was discussed. The patient expressed a clear understanding of the treatment options. With regards to surgery, the procedure risk, benefits, complications, and outcomes were discussed. No guarantees were given with regards to surgical outcome.   The risk of complications, morbidity, and mortality of patient management decisions have been made at the time of this visit. These are associated with the patient's problems, diagnostic procedures and treatment options. This  includes the possible management options selected and those considered but not selected by the patient after shared medical decision making we discussed with the patient.     This note was created using Dragon voice recognition software that occasionally misinterpreted phrases or words.

## 2022-06-15 NOTE — PROCEDURES
Large Joint Aspiration/Injection: bilateral subacromial bursa    Date/Time: 6/15/2022 3:15 PM  Performed by: Martin Vo MD  Authorized by: Martin Vo MD     Consent Done?:  Yes (Verbal)  Indications:  Pain  Site marked: the procedure site was marked    Timeout: prior to procedure the correct patient, procedure, and site was verified    Prep: patient was prepped and draped in usual sterile fashion      Local anesthesia used?: Yes    Local anesthetic:  Lidocaine 1% without epinephrine    Details:  Needle Size:  25 G  Ultrasonic Guidance for needle placement?: No    Location:  Shoulder  Laterality:  Bilateral  Site:  Bilateral subacromial bursa  Medications (Right):  40 mg triamcinolone acetonide 40 mg/mL  Medications (Left):  40 mg triamcinolone acetonide 40 mg/mL  Patient tolerance:  Patient tolerated the procedure well with no immediate complications

## 2022-06-15 NOTE — PROCEDURES
Large Joint Aspiration/Injection: R knee    Date/Time: 6/15/2022 3:15 PM  Performed by: Martin Vo MD  Authorized by: Martin Vo MD     Consent Done?:  Yes (Verbal)  Indications:  Pain  Site marked: the procedure site was marked    Timeout: prior to procedure the correct patient, procedure, and site was verified    Prep: patient was prepped and draped in usual sterile fashion      Local anesthesia used?: Yes    Local anesthetic:  Lidocaine 1% without epinephrine    Details:  Needle Size:  25 G  Ultrasonic Guidance for needle placement?: No    Location:  Knee  Site:  R knee  Medications:  40 mg triamcinolone acetonide 40 mg/mL  Patient tolerance:  Patient tolerated the procedure well with no immediate complications

## 2022-06-22 ENCOUNTER — CLINICAL SUPPORT (OUTPATIENT)
Dept: FAMILY MEDICINE | Facility: CLINIC | Age: 57
End: 2022-06-22
Payer: COMMERCIAL

## 2022-06-22 VITALS — DIASTOLIC BLOOD PRESSURE: 90 MMHG | SYSTOLIC BLOOD PRESSURE: 144 MMHG

## 2022-06-22 RX ORDER — LOSARTAN POTASSIUM 50 MG/1
50 TABLET ORAL DAILY
COMMUNITY
End: 2022-06-22 | Stop reason: SDUPTHER

## 2022-06-22 RX ORDER — LOSARTAN POTASSIUM 50 MG/1
50 TABLET ORAL DAILY
Qty: 30 TABLET | Refills: 0 | Status: SHIPPED | OUTPATIENT
Start: 2022-06-22 | End: 2022-07-21 | Stop reason: SDUPTHER

## 2022-06-22 NOTE — PROGRESS NOTES
N V for Blood Pressure // brought BP ridings // continue Propranolol 80 mg morning daily // Start Losartan 50 mg night daily x Dr Graham //[facility]

## 2022-07-13 ENCOUNTER — CLINICAL SUPPORT (OUTPATIENT)
Dept: FAMILY MEDICINE | Facility: CLINIC | Age: 57
End: 2022-07-13
Payer: COMMERCIAL

## 2022-07-13 VITALS — SYSTOLIC BLOOD PRESSURE: 120 MMHG | DIASTOLIC BLOOD PRESSURE: 78 MMHG

## 2022-09-14 ENCOUNTER — OFFICE VISIT (OUTPATIENT)
Dept: FAMILY MEDICINE | Facility: CLINIC | Age: 57
End: 2022-09-14
Payer: COMMERCIAL

## 2022-09-14 VITALS
DIASTOLIC BLOOD PRESSURE: 86 MMHG | HEART RATE: 56 BPM | SYSTOLIC BLOOD PRESSURE: 136 MMHG | WEIGHT: 219 LBS | HEIGHT: 69 IN | BODY MASS INDEX: 32.44 KG/M2 | TEMPERATURE: 98 F

## 2022-09-14 DIAGNOSIS — I10 PRIMARY HYPERTENSION: Primary | ICD-10-CM

## 2022-09-14 DIAGNOSIS — K21.9 GASTROESOPHAGEAL REFLUX DISEASE WITHOUT ESOPHAGITIS: ICD-10-CM

## 2022-09-14 DIAGNOSIS — G25.2 INTENTION TREMOR: ICD-10-CM

## 2022-09-14 PROCEDURE — 1160F PR REVIEW ALL MEDS BY PRESCRIBER/CLIN PHARMACIST DOCUMENTED: ICD-10-PCS | Mod: CPTII,S$GLB,, | Performed by: PHYSICIAN ASSISTANT

## 2022-09-14 PROCEDURE — 3075F PR MOST RECENT SYSTOLIC BLOOD PRESS GE 130-139MM HG: ICD-10-PCS | Mod: CPTII,S$GLB,, | Performed by: PHYSICIAN ASSISTANT

## 2022-09-14 PROCEDURE — 3008F BODY MASS INDEX DOCD: CPT | Mod: CPTII,S$GLB,, | Performed by: PHYSICIAN ASSISTANT

## 2022-09-14 PROCEDURE — 1159F MED LIST DOCD IN RCRD: CPT | Mod: CPTII,S$GLB,, | Performed by: PHYSICIAN ASSISTANT

## 2022-09-14 PROCEDURE — 3079F DIAST BP 80-89 MM HG: CPT | Mod: CPTII,S$GLB,, | Performed by: PHYSICIAN ASSISTANT

## 2022-09-14 PROCEDURE — 3075F SYST BP GE 130 - 139MM HG: CPT | Mod: CPTII,S$GLB,, | Performed by: PHYSICIAN ASSISTANT

## 2022-09-14 PROCEDURE — 99213 OFFICE O/P EST LOW 20 MIN: CPT | Mod: S$GLB,,, | Performed by: PHYSICIAN ASSISTANT

## 2022-09-14 PROCEDURE — 1160F RVW MEDS BY RX/DR IN RCRD: CPT | Mod: CPTII,S$GLB,, | Performed by: PHYSICIAN ASSISTANT

## 2022-09-14 PROCEDURE — 1159F PR MEDICATION LIST DOCUMENTED IN MEDICAL RECORD: ICD-10-PCS | Mod: CPTII,S$GLB,, | Performed by: PHYSICIAN ASSISTANT

## 2022-09-14 PROCEDURE — 4010F PR ACE/ARB THEARPY RXD/TAKEN: ICD-10-PCS | Mod: CPTII,S$GLB,, | Performed by: PHYSICIAN ASSISTANT

## 2022-09-14 PROCEDURE — 3079F PR MOST RECENT DIASTOLIC BLOOD PRESSURE 80-89 MM HG: ICD-10-PCS | Mod: CPTII,S$GLB,, | Performed by: PHYSICIAN ASSISTANT

## 2022-09-14 PROCEDURE — 4010F ACE/ARB THERAPY RXD/TAKEN: CPT | Mod: CPTII,S$GLB,, | Performed by: PHYSICIAN ASSISTANT

## 2022-09-14 PROCEDURE — 3008F PR BODY MASS INDEX (BMI) DOCUMENTED: ICD-10-PCS | Mod: CPTII,S$GLB,, | Performed by: PHYSICIAN ASSISTANT

## 2022-09-14 PROCEDURE — 99213 PR OFFICE/OUTPT VISIT, EST, LEVL III, 20-29 MIN: ICD-10-PCS | Mod: S$GLB,,, | Performed by: PHYSICIAN ASSISTANT

## 2022-09-14 RX ORDER — OMEPRAZOLE 40 MG/1
40 CAPSULE, DELAYED RELEASE ORAL DAILY
Qty: 90 CAPSULE | Refills: 1 | Status: SHIPPED | OUTPATIENT
Start: 2022-09-14 | End: 2023-05-05 | Stop reason: SDUPTHER

## 2022-09-14 RX ORDER — LOSARTAN POTASSIUM 50 MG/1
50 TABLET ORAL DAILY
Qty: 90 TABLET | Refills: 1 | Status: CANCELLED | OUTPATIENT
Start: 2022-09-14

## 2022-09-14 NOTE — PROGRESS NOTES
"  SUBJECTIVE:    Patient ID: Trino Head is a 57 y.o. male.    Chief Complaint: Hypertension (No bottles, went over meds verbally// SW)    Pt is a 57 y.o. male who presents today for a HTN follow-up.  On last visit, he was started on Propanolol 80 mg q.d. secondary to elevated blood pressure.  Since initiating this medication, in conjunction with his Losartan, BP has been stable and well controlled.  At home, BP typically ranges in the 130's/80's mmHg.  He denies any CP, palpitations, or syncopal episodes.  Additionally, he reports an improvement in his intention tremors since starting the medication.  Today, he reports increased acid reflux.  Particularly after eating or when lying supine, he will experience an "acid taste."  These symptoms are alleviated with OTC p.r.n. Tums.  He reports having to take Tums daily or at least every other day.    Telephone on 05/25/2022   Component Date Value Ref Range Status    WBC 05/27/2022 4.1  3.8 - 10.8 Thousand/uL Final    RBC 05/27/2022 4.57  4.20 - 5.80 Million/uL Final    Hemoglobin 05/27/2022 14.9  13.2 - 17.1 g/dL Final    Hematocrit 05/27/2022 44.3  38.5 - 50.0 % Final    MCV 05/27/2022 96.9  80.0 - 100.0 fL Final    MCH 05/27/2022 32.6  27.0 - 33.0 pg Final    MCHC 05/27/2022 33.6  32.0 - 36.0 g/dL Final    RDW 05/27/2022 12.5  11.0 - 15.0 % Final    Platelets 05/27/2022 279  140 - 400 Thousand/uL Final    MPV 05/27/2022 10.4  7.5 - 12.5 fL Final    Neutrophils, Abs 05/27/2022 1,993  1,500 - 7,800 cells/uL Final    Lymph # 05/27/2022 1,538  850 - 3,900 cells/uL Final    Mono # 05/27/2022 410  200 - 950 cells/uL Final    Eos # 05/27/2022 70  15 - 500 cells/uL Final    Baso # 05/27/2022 90  0 - 200 cells/uL Final    Neutrophils Relative 05/27/2022 48.6  % Final    Lymph % 05/27/2022 37.5  % Final    Mono % 05/27/2022 10.0  % Final    Eosinophil % 05/27/2022 1.7  % Final    Basophil % 05/27/2022 2.2  % Final    Glucose 05/27/2022 92  65 - 99 mg/dL Final    BUN " 05/27/2022 17  7 - 25 mg/dL Final    Creatinine 05/27/2022 0.85  0.70 - 1.33 mg/dL Final    eGFR if non African American 05/27/2022 97  > OR = 60 mL/min/1.73m2 Final    eGFR if  05/27/2022 112  > OR = 60 mL/min/1.73m2 Final    BUN/Creatinine Ratio 05/27/2022 NOT APPLICABLE  6 - 22 (calc) Final    Sodium 05/27/2022 140  135 - 146 mmol/L Final    Potassium 05/27/2022 4.2  3.5 - 5.3 mmol/L Final    Chloride 05/27/2022 106  98 - 110 mmol/L Final    CO2 05/27/2022 29  20 - 32 mmol/L Final    Calcium 05/27/2022 9.3  8.6 - 10.3 mg/dL Final    Total Protein 05/27/2022 6.0 (L)  6.1 - 8.1 g/dL Final    Albumin 05/27/2022 3.9  3.6 - 5.1 g/dL Final    Globulin, Total 05/27/2022 2.1  1.9 - 3.7 g/dL (calc) Final    Albumin/Globulin Ratio 05/27/2022 1.9  1.0 - 2.5 (calc) Final    Total Bilirubin 05/27/2022 0.4  0.2 - 1.2 mg/dL Final    Alkaline Phosphatase 05/27/2022 41  35 - 144 U/L Final    AST 05/27/2022 18  10 - 35 U/L Final    ALT 05/27/2022 15  9 - 46 U/L Final    Cholesterol 05/27/2022 222 (H)  <200 mg/dL Final    HDL 05/27/2022 63  > OR = 40 mg/dL Final    Triglycerides 05/27/2022 78  <150 mg/dL Final    LDL Cholesterol 05/27/2022 142 (H)  mg/dL (calc) Final    HDL/Cholesterol Ratio 05/27/2022 3.5  <5.0 (calc) Final    Non HDL Chol. (LDL+VLDL) 05/27/2022 159 (H)  <130 mg/dL (calc) Final    TSH 05/27/2022 2.45  0.40 - 4.50 mIU/L Final    Color, UA 05/27/2022 YELLOW  YELLOW Final    Appearance, UA 05/27/2022 CLEAR  CLEAR Final    Specific Gravity, UA 05/27/2022 1.024  1.001 - 1.035 Final    pH, UA 05/27/2022 7.0  5.0 - 8.0 Final    Glucose, UA 05/27/2022 NEGATIVE  NEGATIVE Final    Bilirubin, UA 05/27/2022 NEGATIVE  NEGATIVE Final    Ketones, UA 05/27/2022 TRACE (A)  NEGATIVE Final    Occult Blood UA 05/27/2022 NEGATIVE  NEGATIVE Final    Protein, UA 05/27/2022 NEGATIVE  NEGATIVE Final    Nitrite, UA 05/27/2022 NEGATIVE  NEGATIVE Final    Leukocytes, UA 05/27/2022 NEGATIVE  NEGATIVE Final    WBC Casts, UA  05/27/2022 NONE SEEN  < OR = 5 /HPF Final    RBC Casts, UA 05/27/2022 NONE SEEN  < OR = 2 /HPF Final    Squam Epithel, UA 05/27/2022 NONE SEEN  < OR = 5 /HPF Final    Bacteria, UA 05/27/2022 NONE SEEN  NONE SEEN /HPF Final    Hyaline Casts, UA 05/27/2022 NONE SEEN  NONE SEEN /LPF Final    Reflexive Urine Culture 05/27/2022    Final       History reviewed. No pertinent past medical history.  Past Surgical History:   Procedure Laterality Date    COLONOSCOPY  2016    Dr. Chauhan-RTC 10 years    VASECTOMY  2012     History reviewed. No pertinent family history.    Marital Status:   Alcohol History:  reports current alcohol use of about 1.0 standard drink per week.  Tobacco History:  reports that he has never smoked. He has never used smokeless tobacco.  Drug History:  reports no history of drug use.    Health Maintenance Topics with due status: Not Due       Topic Last Completion Date    Colorectal Cancer Screening 06/08/2016    TETANUS VACCINE 09/12/2020    Lipid Panel 05/27/2022     Immunization History   Administered Date(s) Administered    COVID-19 Vaccine 05/27/2022    COVID-19, MRNA, LN-S, PF (MODERNA FULL 0.5 ML DOSE) 03/24/2021, 03/24/2021, 04/21/2021, 04/21/2021, 10/29/2021    Influenza - Quadrivalent - PF *Preferred* (6 months and older) 09/21/2017, 12/15/2018, 09/25/2019, 09/12/2020, 10/28/2021    Influenza - Trivalent (ADULT) 10/03/2009, 10/20/2010, 12/06/2014    Tdap 09/12/2020    Zoster Recombinant 05/18/2022, 07/27/2022       Review of patient's allergies indicates:  No Known Allergies    Current Outpatient Medications:     ascorbic acid, vitamin C, (VITAMIN C) 1000 MG tablet, Take 1,000 mg by mouth once daily., Disp: , Rfl:     aspirin (ECOTRIN) 81 MG EC tablet, Take 81 mg by mouth once daily., Disp: , Rfl:     cholecalciferol, vitamin D3, (VITAMIN D3) 25 mcg (1,000 unit) capsule, Take 1,000 Units by mouth once daily., Disp: , Rfl:     cyanocobalamin (VITAMIN B-12) 1000 MCG tablet, Take 100 mcg  "by mouth once daily., Disp: , Rfl:     flaxseed oil 1,000 mg Cap, Take by mouth., Disp: , Rfl:     ginkgo biloba 60 mg Cap, Take by mouth., Disp: , Rfl:     gluc hull/chondro hull A/vit C/Mn (GLUCOSAMINE 1500 COMPLEX ORAL), Take by mouth., Disp: , Rfl:     losartan (COZAAR) 50 MG tablet, Take 1 tablet (50 mg total) by mouth once daily., Disp: 30 tablet, Rfl: 3    multivit-min/FA/lycopen/lutein (CENTRUM SILVER MEN ORAL), Take by mouth., Disp: , Rfl:     omega 3-dha-epa-fish oil 1,000 mg (120 mg-180 mg) Cap, Take by mouth., Disp: , Rfl:     propranoloL (INDERAL) 40 MG tablet, Take 2 tablets (80 mg total) by mouth once daily., Disp: 180 tablet, Rfl: 1    propranoloL (INNOPRAN XL) 80 mg 24 hr capsule, Take 1 capsule (80 mg total) by mouth every evening., Disp: 30 capsule, Rfl: 3    TURMERIC ORAL, Take by mouth., Disp: , Rfl:     vitamin E 400 UNIT capsule, Take 400 Units by mouth once daily., Disp: , Rfl:     omeprazole (PRILOSEC) 40 MG capsule, Take 1 capsule (40 mg total) by mouth once daily., Disp: 90 capsule, Rfl: 1    Review of Systems   Constitutional:  Negative for activity change, appetite change, chills, fatigue and fever.   Respiratory:  Negative for cough, shortness of breath and wheezing.    Cardiovascular:  Negative for chest pain, palpitations and leg swelling.   Gastrointestinal:  Negative for abdominal distention, abdominal pain, constipation, diarrhea, nausea and vomiting.        "Acid reflux."   Genitourinary:  Negative for difficulty urinating, dysuria, frequency, hematuria and urgency.   Musculoskeletal:  Negative for arthralgias and myalgias.   Neurological:  Negative for dizziness, tremors ("Better since starting the new medication."), syncope, weakness, light-headedness and headaches.   Psychiatric/Behavioral:  Negative for behavioral problems.         Objective:      Vitals:    09/14/22 0805   BP: 136/86   Pulse: (!) 56   Temp: 98 °F (36.7 °C)   Weight: 99.3 kg (219 lb)   Height: 5' 9" (1.753 m) "     Physical Exam  Vitals and nursing note reviewed.   Constitutional:       General: He is not in acute distress.     Appearance: Normal appearance. He is obese. He is not ill-appearing or toxic-appearing.   HENT:      Head: Normocephalic and atraumatic.      Right Ear: External ear normal.      Left Ear: External ear normal.      Nose: No rhinorrhea.      Mouth/Throat:      Mouth: Mucous membranes are moist.      Pharynx: Oropharynx is clear.   Eyes:      General: No scleral icterus.     Extraocular Movements: Extraocular movements intact.      Conjunctiva/sclera: Conjunctivae normal.   Neck:      Vascular: No carotid bruit.   Cardiovascular:      Rate and Rhythm: Normal rate and regular rhythm.      Pulses: Normal pulses.      Heart sounds: Normal heart sounds. No murmur heard.    No friction rub.   Pulmonary:      Effort: Pulmonary effort is normal. No respiratory distress.      Breath sounds: Normal breath sounds. No wheezing, rhonchi or rales.   Abdominal:      General: There is no distension.      Palpations: Abdomen is soft.      Tenderness: There is no abdominal tenderness. There is no guarding or rebound.      Comments: Protuberant belly noted on inspection.   Musculoskeletal:         General: Normal range of motion.      Cervical back: Normal range of motion.      Right lower leg: No edema.      Left lower leg: No edema.   Lymphadenopathy:      Cervical: No cervical adenopathy.   Skin:     General: Skin is warm and dry.      Coloration: Skin is not jaundiced.   Neurological:      General: No focal deficit present.      Mental Status: He is alert. Mental status is at baseline.      Cranial Nerves: No cranial nerve deficit, dysarthria or facial asymmetry.      Sensory: Sensation is intact.      Motor: Tremor (initiation tremor noted bilaterally) present. No weakness.      Coordination: Coordination normal.      Gait: Gait normal.   Psychiatric:         Mood and Affect: Mood normal.         Behavior:  Behavior normal. Behavior is cooperative.         Assessment:       1. Primary hypertension    2. Gastroesophageal reflux disease without esophagitis    3. Intention tremor           Plan:       Primary hypertension  Stable and well controlled.  Continue as is.  No refills requested today.    Gastroesophageal reflux disease without esophagitis  Due to daily symptoms of GERD, start Omeprazole 40 mg q.d..  Dietary modifications were discussed with patient in office today and he was instructed to avoid caffeine, peppermint, alcohol, ect.  If no improvement in symptoms after initiating this treatment regimen, contact the office.  -     omeprazole (PRILOSEC) 40 MG capsule; Take 1 capsule (40 mg total) by mouth once daily.  Dispense: 90 capsule; Refill: 1    Intention tremor  Improved since starting propanolol.    Continue as is.    Follow up in about 8 months (around 5/14/2023) for HTN, With labs prior to visit.        9/14/2022 Mahendra Li PA-C

## 2022-10-19 ENCOUNTER — OFFICE VISIT (OUTPATIENT)
Dept: ORTHOPEDICS | Facility: CLINIC | Age: 57
End: 2022-10-19
Payer: COMMERCIAL

## 2022-10-19 VITALS
DIASTOLIC BLOOD PRESSURE: 90 MMHG | SYSTOLIC BLOOD PRESSURE: 132 MMHG | BODY MASS INDEX: 32.44 KG/M2 | HEIGHT: 69 IN | WEIGHT: 219 LBS

## 2022-10-19 DIAGNOSIS — M17.12 PRIMARY OSTEOARTHRITIS OF LEFT KNEE: ICD-10-CM

## 2022-10-19 DIAGNOSIS — M17.11 PRIMARY OSTEOARTHRITIS OF RIGHT KNEE: Primary | ICD-10-CM

## 2022-10-19 PROCEDURE — 99213 OFFICE O/P EST LOW 20 MIN: CPT | Mod: 25,S$GLB,, | Performed by: ORTHOPAEDIC SURGERY

## 2022-10-19 PROCEDURE — 3080F DIAST BP >= 90 MM HG: CPT | Mod: CPTII,S$GLB,, | Performed by: ORTHOPAEDIC SURGERY

## 2022-10-19 PROCEDURE — 4010F PR ACE/ARB THEARPY RXD/TAKEN: ICD-10-PCS | Mod: CPTII,S$GLB,, | Performed by: ORTHOPAEDIC SURGERY

## 2022-10-19 PROCEDURE — 1159F MED LIST DOCD IN RCRD: CPT | Mod: CPTII,S$GLB,, | Performed by: ORTHOPAEDIC SURGERY

## 2022-10-19 PROCEDURE — 3080F PR MOST RECENT DIASTOLIC BLOOD PRESSURE >= 90 MM HG: ICD-10-PCS | Mod: CPTII,S$GLB,, | Performed by: ORTHOPAEDIC SURGERY

## 2022-10-19 PROCEDURE — 99213 PR OFFICE/OUTPT VISIT, EST, LEVL III, 20-29 MIN: ICD-10-PCS | Mod: 25,S$GLB,, | Performed by: ORTHOPAEDIC SURGERY

## 2022-10-19 PROCEDURE — 1160F PR REVIEW ALL MEDS BY PRESCRIBER/CLIN PHARMACIST DOCUMENTED: ICD-10-PCS | Mod: CPTII,S$GLB,, | Performed by: ORTHOPAEDIC SURGERY

## 2022-10-19 PROCEDURE — 3075F PR MOST RECENT SYSTOLIC BLOOD PRESS GE 130-139MM HG: ICD-10-PCS | Mod: CPTII,S$GLB,, | Performed by: ORTHOPAEDIC SURGERY

## 2022-10-19 PROCEDURE — 1160F RVW MEDS BY RX/DR IN RCRD: CPT | Mod: CPTII,S$GLB,, | Performed by: ORTHOPAEDIC SURGERY

## 2022-10-19 PROCEDURE — 1159F PR MEDICATION LIST DOCUMENTED IN MEDICAL RECORD: ICD-10-PCS | Mod: CPTII,S$GLB,, | Performed by: ORTHOPAEDIC SURGERY

## 2022-10-19 PROCEDURE — 4010F ACE/ARB THERAPY RXD/TAKEN: CPT | Mod: CPTII,S$GLB,, | Performed by: ORTHOPAEDIC SURGERY

## 2022-10-19 PROCEDURE — 20610 LARGE JOINT ASPIRATION/INJECTION: R KNEE: ICD-10-PCS | Mod: 50,S$GLB,, | Performed by: ORTHOPAEDIC SURGERY

## 2022-10-19 PROCEDURE — 20610 DRAIN/INJ JOINT/BURSA W/O US: CPT | Mod: 50,S$GLB,, | Performed by: ORTHOPAEDIC SURGERY

## 2022-10-19 PROCEDURE — 3075F SYST BP GE 130 - 139MM HG: CPT | Mod: CPTII,S$GLB,, | Performed by: ORTHOPAEDIC SURGERY

## 2022-10-19 RX ORDER — MELOXICAM 15 MG/1
15 TABLET ORAL DAILY
Qty: 30 TABLET | Refills: 2 | Status: SHIPPED | OUTPATIENT
Start: 2022-10-19 | End: 2023-06-14 | Stop reason: SDUPTHER

## 2022-10-19 RX ORDER — TRIAMCINOLONE ACETONIDE 40 MG/ML
40 INJECTION, SUSPENSION INTRA-ARTICULAR; INTRAMUSCULAR
Status: DISCONTINUED | OUTPATIENT
Start: 2022-10-19 | End: 2022-10-19 | Stop reason: HOSPADM

## 2022-10-19 RX ADMIN — TRIAMCINOLONE ACETONIDE 40 MG: 40 INJECTION, SUSPENSION INTRA-ARTICULAR; INTRAMUSCULAR at 08:10

## 2022-10-19 NOTE — PROCEDURES
mobicLarge Joint Aspiration/Injection: R knee    Date/Time: 10/19/2022 8:15 AM  Performed by: Martin Vo MD  Authorized by: Martin Vo MD     Consent Done?:  Yes (Verbal)  Indications:  Arthritis and pain  Site marked: the procedure site was marked    Timeout: prior to procedure the correct patient, procedure, and site was verified    Prep: patient was prepped and draped in usual sterile fashion      Local anesthesia used?: Yes    Local anesthetic:  Lidocaine 1% without epinephrine  Ultrasonic Guidance for needle placement?: No    Location:  Knee  Site:  R knee  Medications:  40 mg triamcinolone acetonide 40 mg/mL  Patient tolerance:  Patient tolerated the procedure well with no immediate complications

## 2022-10-19 NOTE — PROCEDURES
Large Joint Aspiration/Injection: L knee    Date/Time: 10/19/2022 8:15 AM  Performed by: Martin Vo MD  Authorized by: Martin Vo MD     Consent Done?:  Yes (Verbal)  Indications:  Arthritis and pain  Site marked: the procedure site was marked    Timeout: prior to procedure the correct patient, procedure, and site was verified    Prep: patient was prepped and draped in usual sterile fashion      Local anesthesia used?: Yes    Local anesthetic:  Lidocaine 1% without epinephrine    Details:  Needle Size:  25 G  Ultrasonic Guidance for needle placement?: No    Location:  Knee  Site:  L knee  Medications:  40 mg triamcinolone acetonide 40 mg/mL  Patient tolerance:  Patient tolerated the procedure well with no immediate complications

## 2022-10-19 NOTE — PROGRESS NOTES
Subjective:       Patient ID: Trino Head is a 57 y.o. male.    Chief Complaint: Pain of the Right Knee (Patient is here with complaints of bilateral knee pain, would like injections, Right knee worse than left, difficulty climbing stairs) and Pain of the Left Knee      History of Present Illness    Prior to meeting with the patient I reviewed the medical chart in [facility]. This included reviewing the previous progress notes from our office, review of the patient's last appointment with their primary care provider, review of any visits to the emergency room, and review of any pain management appointments or procedures.   Patient is here follow-up chief complaint bilateral knee pain right greater than left.  Previously diagnosed with right knee degenerative joint disease/arthritis.  Responded well to a right knee intra-articular corticosteroid injection.    Current Medications  Current Outpatient Medications   Medication Sig Dispense Refill    ascorbic acid, vitamin C, (VITAMIN C) 1000 MG tablet Take 1,000 mg by mouth once daily.      aspirin (ECOTRIN) 81 MG EC tablet Take 81 mg by mouth once daily.      cholecalciferol, vitamin D3, (VITAMIN D3) 25 mcg (1,000 unit) capsule Take 1,000 Units by mouth once daily.      cyanocobalamin (VITAMIN B-12) 1000 MCG tablet Take 100 mcg by mouth once daily.      flaxseed oil 1,000 mg Cap Take by mouth.      ginkgo biloba 60 mg Cap Take by mouth.      gluc hull/chondro hull A/vit C/Mn (GLUCOSAMINE 1500 COMPLEX ORAL) Take by mouth.      losartan (COZAAR) 50 MG tablet Take 1 tablet (50 mg total) by mouth once daily. 30 tablet 3    multivit-min/FA/lycopen/lutein (CENTRUM SILVER MEN ORAL) Take by mouth.      omega 3-dha-epa-fish oil 1,000 mg (120 mg-180 mg) Cap Take by mouth.      omeprazole (PRILOSEC) 40 MG capsule Take 1 capsule (40 mg total) by mouth once daily. 90 capsule 1    propranoloL (INDERAL) 40 MG tablet Take 2 tablets (80 mg total) by mouth once daily. 180 tablet 1     propranoloL (INNOPRAN XL) 80 mg 24 hr capsule Take 1 capsule (80 mg total) by mouth every evening. 30 capsule 3    TURMERIC ORAL Take by mouth.      vitamin E 400 UNIT capsule Take 400 Units by mouth once daily.       No current facility-administered medications for this visit.       Allergies  Review of patient's allergies indicates:  No Known Allergies    Past Medical History  No past medical history on file.    Surgical History  Past Surgical History:   Procedure Laterality Date    COLONOSCOPY  2016    Dr. Chauhan-RTC 10 years    VASECTOMY  2012       Family History:   No family history on file.    Social History:   Social History     Socioeconomic History    Marital status:    Tobacco Use    Smoking status: Never    Smokeless tobacco: Never   Substance and Sexual Activity    Alcohol use: Yes     Alcohol/week: 1.0 standard drink     Types: 1 Cans of beer per week    Drug use: Never    Sexual activity: Yes     Partners: Female     Birth control/protection: None       Hospitalization/Major Diagnostic Procedure:     Review of Systems     General/Constitutional:  Chills denies. Fatigue denies. Fever denies. Weight gain denies. Weight loss denies.    Respiratory:  Shortness of breath denies.    Cardiovascular:  Chest pain denies.    Gastrointestinal:  Constipation denies. Diarrhea denies. Nausea denies. Vomiting denies.     Hematology:  Easy bruising denies. Prolonged bleeding denies.     Genitourinary:  Frequent urination denies. Pain in lower back denies. Painful urination denies.     Musculoskeletal:  See HPI for details    Skin:  Rash denies.    Neurologic:  Dizziness denies. Gait abnormalities denies. Seizures denies. Tingling/Numbess denies.    Psychiatric:  Anxiety denies. Depressed mood denies.     Objective:   Vital Signs:   Vitals:    10/19/22 0824   BP: (!) 132/90        Physical Exam      General Examination:     Constitutional: The patient is alert and oriented to lace person and time. Mood is  pleasant.     Head/Face: Normal facial features normal eyebrows    Eyes: Normal extraocular motion bilaterally    Lungs: Respirations are equal and unlabored    Gait is coordinated.    Cardiovascular: There are no swelling or varicosities present.    Lymphatic: Negative for adenopathy    Skin: Normal    Neurological: Level of consciousness normal. Oriented to place person and time and situation    Psychiatric: Oriented to time place person and situation    Bilateral knee exam demonstrates a mild antalgic gait.  Mild effusion of the right knee.  Medial joint line tenderness to palpation of both knees.  No ligamentous instability and full active range of motion with normal strength.  The right knee is lacking some end range extension and flexion secondary to the effusion.    XRAY Report/ Interpretation:  Bilateral knee x-rays AP and lateral views taken in the office today reviewed the patient demonstrates bilateral knee medial compartment degenerative joint disease right greater than left.      Assessment:       1. Primary osteoarthritis of right knee    2. Primary osteoarthritis of left knee          Plan:       Trino was seen today for pain and pain.    Diagnoses and all orders for this visit:    Primary osteoarthritis of right knee  -     X-Ray Knee 1 or 2 View Bilateral    Primary osteoarthritis of left knee  -     X-Ray Knee 1 or 2 View Bilateral         No follow-ups on file.  This is to attest that the physician's assistant Titus Perez served in the capacity as a scribe for this patient's encounter.  This is also to verify that I have reviewed the patient's history and helped formulate the treatment plan and discussed it with the physician's assistant.  I have actively participated  in the evaluation and treatment plan for this patient visit.  The treatment plan and medical decision-making is as outlined below:  Patient was given bilateral knee intra-articular injections each with 40 mg of Kenalog and 3  cc lidocaine.  Please see procedure report for details.  Activity as tolerated.  Follow-up p.r.n..  We should not repeat these corticosteroid injections for least another 4 months.  Prescription for Mobic 15 mg q.d. t p.r.n. o help with exacerbations of symptoms.    Treatment options were discussed with regards to the nature of the medical condition. Conservative pain intervention and surgical options were discussed in detail. The probability of success of each separate treatment option was discussed. The patient expressed a clear understanding of the treatment options. With regards to surgery, the procedure risk, benefits, complications, and outcomes were discussed. No guarantees were given with regards to surgical outcome.   The risk of complications, morbidity, and mortality of patient management decisions have been made at the time of this visit. These are associated with the patient's problems, diagnostic procedures and treatment options. This includes the possible management options selected and those considered but not selected by the patient after shared medical decision making we discussed with the patient.     This note was created using Dragon voice recognition software that occasionally misinterpreted phrases or words.

## 2022-10-23 ENCOUNTER — PATIENT MESSAGE (OUTPATIENT)
Dept: FAMILY MEDICINE | Facility: CLINIC | Age: 57
End: 2022-10-23

## 2022-10-24 NOTE — TELEPHONE ENCOUNTER
Meloxicam will be fine so long as he doesn't see an increase in his pressure. He can monitor at home for this. Sometimes NSAIDs will cause that.

## 2022-12-13 DIAGNOSIS — K21.9 GASTROESOPHAGEAL REFLUX DISEASE WITHOUT ESOPHAGITIS: ICD-10-CM

## 2022-12-13 RX ORDER — OMEPRAZOLE 40 MG/1
40 CAPSULE, DELAYED RELEASE ORAL DAILY
Qty: 90 CAPSULE | Refills: 1 | Status: CANCELLED | OUTPATIENT
Start: 2022-12-13 | End: 2023-12-13

## 2023-03-03 ENCOUNTER — PATIENT MESSAGE (OUTPATIENT)
Dept: FAMILY MEDICINE | Facility: CLINIC | Age: 58
End: 2023-03-03

## 2023-03-03 DIAGNOSIS — G25.2 INTENTION TREMOR: ICD-10-CM

## 2023-03-03 RX ORDER — PROPRANOLOL HYDROCHLORIDE 40 MG/1
80 TABLET ORAL DAILY
Qty: 180 TABLET | Refills: 1 | Status: SHIPPED | OUTPATIENT
Start: 2023-03-03 | End: 2023-06-14 | Stop reason: SDUPTHER

## 2023-03-22 ENCOUNTER — OFFICE VISIT (OUTPATIENT)
Dept: ORTHOPEDICS | Facility: CLINIC | Age: 58
End: 2023-03-22
Payer: COMMERCIAL

## 2023-03-22 VITALS — BODY MASS INDEX: 33.33 KG/M2 | WEIGHT: 225 LBS | HEIGHT: 69 IN

## 2023-03-22 DIAGNOSIS — M17.11 PRIMARY OSTEOARTHRITIS OF RIGHT KNEE: Primary | ICD-10-CM

## 2023-03-22 DIAGNOSIS — M17.12 PRIMARY OSTEOARTHRITIS OF LEFT KNEE: ICD-10-CM

## 2023-03-22 DIAGNOSIS — M75.41 SHOULDER IMPINGEMENT SYNDROME, RIGHT: ICD-10-CM

## 2023-03-22 DIAGNOSIS — M75.42 SHOULDER IMPINGEMENT SYNDROME, LEFT: ICD-10-CM

## 2023-03-22 PROCEDURE — 1159F PR MEDICATION LIST DOCUMENTED IN MEDICAL RECORD: ICD-10-PCS | Mod: CPTII,S$GLB,, | Performed by: ORTHOPAEDIC SURGERY

## 2023-03-22 PROCEDURE — 20610 LARGE JOINT ASPIRATION/INJECTION: R SUBACROMIAL BURSA: ICD-10-PCS | Mod: 50,S$GLB,, | Performed by: ORTHOPAEDIC SURGERY

## 2023-03-22 PROCEDURE — 3008F BODY MASS INDEX DOCD: CPT | Mod: CPTII,S$GLB,, | Performed by: ORTHOPAEDIC SURGERY

## 2023-03-22 PROCEDURE — 4010F ACE/ARB THERAPY RXD/TAKEN: CPT | Mod: CPTII,S$GLB,, | Performed by: ORTHOPAEDIC SURGERY

## 2023-03-22 PROCEDURE — 20610 DRAIN/INJ JOINT/BURSA W/O US: CPT | Mod: 50,S$GLB,, | Performed by: ORTHOPAEDIC SURGERY

## 2023-03-22 PROCEDURE — 99213 OFFICE O/P EST LOW 20 MIN: CPT | Mod: 25,S$GLB,, | Performed by: ORTHOPAEDIC SURGERY

## 2023-03-22 PROCEDURE — 1160F RVW MEDS BY RX/DR IN RCRD: CPT | Mod: CPTII,S$GLB,, | Performed by: ORTHOPAEDIC SURGERY

## 2023-03-22 PROCEDURE — 1160F PR REVIEW ALL MEDS BY PRESCRIBER/CLIN PHARMACIST DOCUMENTED: ICD-10-PCS | Mod: CPTII,S$GLB,, | Performed by: ORTHOPAEDIC SURGERY

## 2023-03-22 PROCEDURE — 4010F PR ACE/ARB THEARPY RXD/TAKEN: ICD-10-PCS | Mod: CPTII,S$GLB,, | Performed by: ORTHOPAEDIC SURGERY

## 2023-03-22 PROCEDURE — 99213 PR OFFICE/OUTPT VISIT, EST, LEVL III, 20-29 MIN: ICD-10-PCS | Mod: 25,S$GLB,, | Performed by: ORTHOPAEDIC SURGERY

## 2023-03-22 PROCEDURE — 3008F PR BODY MASS INDEX (BMI) DOCUMENTED: ICD-10-PCS | Mod: CPTII,S$GLB,, | Performed by: ORTHOPAEDIC SURGERY

## 2023-03-22 PROCEDURE — 1159F MED LIST DOCD IN RCRD: CPT | Mod: CPTII,S$GLB,, | Performed by: ORTHOPAEDIC SURGERY

## 2023-03-22 RX ORDER — TRIAMCINOLONE ACETONIDE 40 MG/ML
40 INJECTION, SUSPENSION INTRA-ARTICULAR; INTRAMUSCULAR
Status: DISCONTINUED | OUTPATIENT
Start: 2023-03-22 | End: 2023-03-22 | Stop reason: HOSPADM

## 2023-03-22 RX ADMIN — TRIAMCINOLONE ACETONIDE 40 MG: 40 INJECTION, SUSPENSION INTRA-ARTICULAR; INTRAMUSCULAR at 09:03

## 2023-03-22 NOTE — PROCEDURES
Large Joint Aspiration/Injection: L subacromial bursa    Date/Time: 3/22/2023 9:15 AM  Performed by: Martin Vo MD  Authorized by: Martin Vo MD     Consent Done?:  Yes (Verbal)  Indications:  Pain  Site marked: the procedure site was marked    Timeout: prior to procedure the correct patient, procedure, and site was verified    Prep: patient was prepped and draped in usual sterile fashion      Local anesthesia used?: Yes    Local anesthetic:  Lidocaine 1% without epinephrine    Details:  Needle Size:  22 G  Ultrasonic Guidance for needle placement?: No    Location:  Shoulder  Site:  L subacromial bursa  Medications:  40 mg triamcinolone acetonide 40 mg/mL  Patient tolerance:  Patient tolerated the procedure well with no immediate complications

## 2023-03-22 NOTE — PROCEDURES
Large Joint Aspiration/Injection: R subacromial bursa    Date/Time: 3/22/2023 9:15 AM  Performed by: Martin Vo MD  Authorized by: Martin Vo MD     Consent Done?:  Yes (Verbal)  Indications:  Pain  Site marked: the procedure site was marked    Timeout: prior to procedure the correct patient, procedure, and site was verified    Prep: patient was prepped and draped in usual sterile fashion      Local anesthesia used?: Yes    Local anesthetic:  Lidocaine 1% without epinephrine    Details:  Needle Size:  22 G  Ultrasonic Guidance for needle placement?: No    Location:  Shoulder  Site:  R subacromial bursa  Medications:  40 mg triamcinolone acetonide 40 mg/mL  Patient tolerance:  Patient tolerated the procedure well with no immediate complications

## 2023-03-22 NOTE — PROGRESS NOTES
Subjective:       Patient ID: Trino Head is a 58 y.o. male.    Chief Complaint: Pain of the Left Knee (Patient is here for a f/up on Bilateral knees & Shoulders. Injections in knees didn't help much, would like to discuss Synvisc injections for his knees. Injections worked really well in his shoulders and would like to repeat them today is possible. ), Pain of the Right Knee, Pain of the Right Shoulder, and Pain of the Left Shoulder      History of Present Illness    Prior to meeting with the patient I reviewed the medical chart in [facility]. This included reviewing the previous progress notes from our office, review of the patient's last appointment with their primary care provider, review of any visits to the emergency room, and review of any pain management appointments or procedures.   Patient returns follow-up of his knees and both shoulders when last seen we gave him still subacromial injections in both shoulders which was very successful and October however symptoms have returned.  Has pain with lifting and movement.  We gave him a steroid injection in both knees last visit which was unsuccessful.  Pain is mainly medial.  Pain in his knee significantly limits his activity level.    Current Medications  Current Outpatient Medications   Medication Sig Dispense Refill    ascorbic acid, vitamin C, (VITAMIN C) 1000 MG tablet Take 1,000 mg by mouth once daily.      aspirin (ECOTRIN) 81 MG EC tablet Take 81 mg by mouth once daily.      cholecalciferol, vitamin D3, (VITAMIN D3) 25 mcg (1,000 unit) capsule Take 1,000 Units by mouth once daily.      cyanocobalamin (VITAMIN B-12) 1000 MCG tablet Take 100 mcg by mouth once daily.      flaxseed oil 1,000 mg Cap Take by mouth.      ginkgo biloba 60 mg Cap Take by mouth.      gluc hull/chondro hull A/vit C/Mn (GLUCOSAMINE 1500 COMPLEX ORAL) Take by mouth.      losartan (COZAAR) 50 MG tablet Take 1 tablet (50 mg total) by mouth once daily. 90 tablet 1    meloxicam (MOBIC) 15 MG  tablet Take 1 tablet (15 mg total) by mouth once daily. 30 tablet 2    multivit-min/FA/lycopen/lutein (CENTRUM SILVER MEN ORAL) Take by mouth.      omega 3-dha-epa-fish oil 1,000 mg (120 mg-180 mg) Cap Take by mouth.      omeprazole (PRILOSEC) 40 MG capsule Take 1 capsule (40 mg total) by mouth once daily. 90 capsule 1    propranoloL (INDERAL) 40 MG tablet Take 2 tablets (80 mg total) by mouth once daily. 180 tablet 1    propranoloL (INNOPRAN XL) 80 mg 24 hr capsule Take 1 capsule (80 mg total) by mouth every evening. 30 capsule 3    TURMERIC ORAL Take by mouth.      vitamin E 400 UNIT capsule Take 400 Units by mouth once daily.       No current facility-administered medications for this visit.       Allergies  Review of patient's allergies indicates:  No Known Allergies    Past Medical History  No past medical history on file.    Surgical History  Past Surgical History:   Procedure Laterality Date    COLONOSCOPY  2016    Dr. Chauhan-RTC 10 years    VASECTOMY  2012       Family History:   No family history on file.    Social History:   Social History     Socioeconomic History    Marital status:    Tobacco Use    Smoking status: Never    Smokeless tobacco: Never   Substance and Sexual Activity    Alcohol use: Yes     Alcohol/week: 1.0 standard drink     Types: 1 Cans of beer per week    Drug use: Never    Sexual activity: Yes     Partners: Female     Birth control/protection: None       Hospitalization/Major Diagnostic Procedure:     Review of Systems     General/Constitutional:  Chills denies. Fatigue denies. Fever denies. Weight gain denies. Weight loss denies.    Respiratory:  Shortness of breath denies.    Cardiovascular:  Chest pain denies.    Gastrointestinal:  Constipation denies. Diarrhea denies. Nausea denies. Vomiting denies.     Hematology:  Easy bruising denies. Prolonged bleeding denies.     Genitourinary:  Frequent urination denies. Pain in lower back denies. Painful urination denies.      Musculoskeletal:  See HPI for details    Skin:  Rash denies.    Neurologic:  Dizziness denies. Gait abnormalities denies. Seizures denies. Tingling/Numbess denies.    Psychiatric:  Anxiety denies. Depressed mood denies.     Objective:   Vital Signs: There were no vitals filed for this visit.     Physical Exam      General Examination:     Constitutional: The patient is alert and oriented to lace person and time. Mood is pleasant.     Head/Face: Normal facial features normal eyebrows    Eyes: Normal extraocular motion bilaterally    Lungs: Respirations are equal and unlabored    Gait is coordinated.    Cardiovascular: There are no swelling or varicosities present.    Lymphatic: Negative for adenopathy    Skin: Normal    Neurological: Level of consciousness normal. Oriented to place person and time and situation    Psychiatric: Oriented to time place person and situation    Right and left shoulders were reviewed early osteoarthritic changes noted in acromioclavicular joints both shoulders glenohumeral joint looks fine.     XRAY Report/ Interpretation: AP lateral x-rays both knees were taken reviewed.  There is moderate narrowing medial compartment of both knees right greater than left with medial osteophyte tear and moderate joint space reduction particularly on the right side I would grade this grade 3 on the Kellgren Edilson grading scale      Assessment:       1. Primary osteoarthritis of right knee    2. Primary osteoarthritis of left knee    3. Shoulder impingement syndrome, right    4. Shoulder impingement syndrome, left          Plan:       Trino was seen today for pain, pain, pain and pain.    Diagnoses and all orders for this visit:    Primary osteoarthritis of right knee  -     X-Ray Knee 1 or 2 View Bilateral    Primary osteoarthritis of left knee  -     X-Ray Knee 1 or 2 View Bilateral    Shoulder impingement syndrome, right  -     X-Ray Shoulder 2 or more views Bilat    Shoulder impingement  syndrome, left  -     X-Ray Shoulder 2 or more views Bilat         No follow-ups on file.    Steroid injections in both knees have failed and therefore we recommend he is a candidate for viscosupplementation injections into both knees.  Regarding his shoulders we did give him a steroid xylocaine injection into the subacromial bursa both shoulders for cc Kenalog 3 cc xylocaine no side effects noted we explained will have to get the viscosupplementation injections approved by his insurance and have him come back after it is approved he is trying his best to avoid having knee replacement surgery which I concur  Treatment options were discussed with regards to the nature of the medical condition. Conservative pain intervention and surgical options were discussed in detail. The probability of success of each separate treatment option was discussed. The patient expressed a clear understanding of the treatment options. With regards to surgery, the procedure risk, benefits, complications, and outcomes were discussed. No guarantees were given with regards to surgical outcome.   The risk of complications, morbidity, and mortality of patient management decisions have been made at the time of this visit. These are associated with the patient's problems, diagnostic procedures and treatment options. This includes the possible management options selected and those considered but not selected by the patient after shared medical decision making we discussed with the patient.     This note was created using Dragon voice recognition software that occasionally misinterpreted phrases or words.

## 2023-03-31 ENCOUNTER — PATIENT MESSAGE (OUTPATIENT)
Dept: ORTHOPEDICS | Facility: CLINIC | Age: 58
End: 2023-03-31

## 2023-04-21 ENCOUNTER — TELEPHONE (OUTPATIENT)
Dept: ORTHOPEDICS | Facility: CLINIC | Age: 58
End: 2023-04-21

## 2023-04-21 NOTE — TELEPHONE ENCOUNTER
Patient's insurance [employer] denied the synvisc request for his bilateral knees, saying it did not meet medical necessity. I left patient a message letting him know I would speak to Dr Vo on Monday when he is back in the office & show him the denial to see what the next steps are. He also has an appt 4/26

## 2023-04-26 ENCOUNTER — OFFICE VISIT (OUTPATIENT)
Dept: ORTHOPEDICS | Facility: CLINIC | Age: 58
End: 2023-04-26
Payer: COMMERCIAL

## 2023-04-26 VITALS — WEIGHT: 225 LBS | BODY MASS INDEX: 33.33 KG/M2 | HEIGHT: 69 IN

## 2023-04-26 DIAGNOSIS — M17.11 PRIMARY OSTEOARTHRITIS OF RIGHT KNEE: Primary | ICD-10-CM

## 2023-04-26 DIAGNOSIS — M17.12 PRIMARY OSTEOARTHRITIS OF LEFT KNEE: ICD-10-CM

## 2023-04-26 PROCEDURE — 1159F PR MEDICATION LIST DOCUMENTED IN MEDICAL RECORD: ICD-10-PCS | Mod: CPTII,S$GLB,, | Performed by: ORTHOPAEDIC SURGERY

## 2023-04-26 PROCEDURE — 4010F ACE/ARB THERAPY RXD/TAKEN: CPT | Mod: CPTII,S$GLB,, | Performed by: ORTHOPAEDIC SURGERY

## 2023-04-26 PROCEDURE — 20610 LARGE JOINT ASPIRATION/INJECTION: R KNEE: ICD-10-PCS | Mod: 50,S$GLB,, | Performed by: ORTHOPAEDIC SURGERY

## 2023-04-26 PROCEDURE — 20610 DRAIN/INJ JOINT/BURSA W/O US: CPT | Mod: 50,S$GLB,, | Performed by: ORTHOPAEDIC SURGERY

## 2023-04-26 PROCEDURE — 3008F PR BODY MASS INDEX (BMI) DOCUMENTED: ICD-10-PCS | Mod: CPTII,S$GLB,, | Performed by: ORTHOPAEDIC SURGERY

## 2023-04-26 PROCEDURE — 1160F RVW MEDS BY RX/DR IN RCRD: CPT | Mod: CPTII,S$GLB,, | Performed by: ORTHOPAEDIC SURGERY

## 2023-04-26 PROCEDURE — 1159F MED LIST DOCD IN RCRD: CPT | Mod: CPTII,S$GLB,, | Performed by: ORTHOPAEDIC SURGERY

## 2023-04-26 PROCEDURE — 4010F PR ACE/ARB THEARPY RXD/TAKEN: ICD-10-PCS | Mod: CPTII,S$GLB,, | Performed by: ORTHOPAEDIC SURGERY

## 2023-04-26 PROCEDURE — 99213 OFFICE O/P EST LOW 20 MIN: CPT | Mod: 25,S$GLB,, | Performed by: ORTHOPAEDIC SURGERY

## 2023-04-26 PROCEDURE — 3008F BODY MASS INDEX DOCD: CPT | Mod: CPTII,S$GLB,, | Performed by: ORTHOPAEDIC SURGERY

## 2023-04-26 PROCEDURE — 99213 PR OFFICE/OUTPT VISIT, EST, LEVL III, 20-29 MIN: ICD-10-PCS | Mod: 25,S$GLB,, | Performed by: ORTHOPAEDIC SURGERY

## 2023-04-26 PROCEDURE — 1160F PR REVIEW ALL MEDS BY PRESCRIBER/CLIN PHARMACIST DOCUMENTED: ICD-10-PCS | Mod: CPTII,S$GLB,, | Performed by: ORTHOPAEDIC SURGERY

## 2023-04-26 RX ORDER — TRIAMCINOLONE ACETONIDE 40 MG/ML
40 INJECTION, SUSPENSION INTRA-ARTICULAR; INTRAMUSCULAR
Status: DISCONTINUED | OUTPATIENT
Start: 2023-04-26 | End: 2023-04-26 | Stop reason: HOSPADM

## 2023-04-26 RX ADMIN — TRIAMCINOLONE ACETONIDE 40 MG: 40 INJECTION, SUSPENSION INTRA-ARTICULAR; INTRAMUSCULAR at 09:04

## 2023-04-26 NOTE — PROGRESS NOTES
Subjective:       Patient ID: Trino Head is a 58 y.o. male.    Chief Complaint: Pain of the Right Knee (Patient is having b/l knee pain, he is requesting injection in both knee today.) and Pain of the Left Knee      History of Present Illness    Prior to meeting with the patient I reviewed the medical chart in [facility]. This included reviewing the previous progress notes from our office, review of the patient's last appointment with their primary care provider, review of any visits to the emergency room, and review of any pain management appointments or procedures.   Patient comes in today for follow-up for his bilateral knee osteoarthritis.  He was last seen and injected in his bilateral knees in October 2022.  He is had multiple injections in the past that have been efficacious.  We attempted to order bilateral knee viscosupplementation but was denied by his healthcare insurance.  He comes in today requesting repeat corticosteroid injections as his pain has returned.  We will also try to attempt to reorder viscosupplementation again.  Denies any new injury or trauma.  He does have increased pain with the knee in a flexed position such as ascending/descending stairs or squatting/kneeling.    Current Medications  Current Outpatient Medications   Medication Sig Dispense Refill    ascorbic acid, vitamin C, (VITAMIN C) 1000 MG tablet Take 1,000 mg by mouth once daily.      aspirin (ECOTRIN) 81 MG EC tablet Take 81 mg by mouth once daily.      cholecalciferol, vitamin D3, (VITAMIN D3) 25 mcg (1,000 unit) capsule Take 1,000 Units by mouth once daily.      cyanocobalamin (VITAMIN B-12) 1000 MCG tablet Take 100 mcg by mouth once daily.      flaxseed oil 1,000 mg Cap Take by mouth.      ginkgo biloba 60 mg Cap Take by mouth.      gluc hull/chondro hull A/vit C/Mn (GLUCOSAMINE 1500 COMPLEX ORAL) Take by mouth.      losartan (COZAAR) 50 MG tablet Take 1 tablet (50 mg total) by mouth once daily. 90 tablet 1    meloxicam (MOBIC) 15  MG tablet Take 1 tablet (15 mg total) by mouth once daily. 30 tablet 2    multivit-min/FA/lycopen/lutein (CENTRUM SILVER MEN ORAL) Take by mouth.      omega 3-dha-epa-fish oil 1,000 mg (120 mg-180 mg) Cap Take by mouth.      omeprazole (PRILOSEC) 40 MG capsule Take 1 capsule (40 mg total) by mouth once daily. 90 capsule 1    propranoloL (INDERAL) 40 MG tablet Take 2 tablets (80 mg total) by mouth once daily. 180 tablet 1    propranoloL (INNOPRAN XL) 80 mg 24 hr capsule Take 1 capsule (80 mg total) by mouth every evening. 30 capsule 3    TURMERIC ORAL Take by mouth.      vitamin E 400 UNIT capsule Take 400 Units by mouth once daily.       No current facility-administered medications for this visit.       Allergies  Review of patient's allergies indicates:  No Known Allergies    Past Medical History  History reviewed. No pertinent past medical history.    Surgical History  Past Surgical History:   Procedure Laterality Date    COLONOSCOPY  2016    Dr. Chauhan-RTC 10 years    VASECTOMY  2012       Family History:   History reviewed. No pertinent family history.    Social History:   Social History     Socioeconomic History    Marital status:    Tobacco Use    Smoking status: Never    Smokeless tobacco: Never   Substance and Sexual Activity    Alcohol use: Yes     Alcohol/week: 1.0 standard drink     Types: 1 Cans of beer per week    Drug use: Never    Sexual activity: Yes     Partners: Female     Birth control/protection: None       Hospitalization/Major Diagnostic Procedure:     Review of Systems     General/Constitutional:  Chills denies. Fatigue denies. Fever denies. Weight gain denies. Weight loss denies.    Respiratory:  Shortness of breath denies.    Cardiovascular:  Chest pain denies.    Gastrointestinal:  Constipation denies. Diarrhea denies. Nausea denies. Vomiting denies.     Hematology:  Easy bruising denies. Prolonged bleeding denies.     Genitourinary:  Frequent urination denies. Pain in lower back  denies. Painful urination denies.     Musculoskeletal:  See HPI for details    Skin:  Rash denies.    Neurologic:  Dizziness denies. Gait abnormalities denies. Seizures denies. Tingling/Numbess denies.    Psychiatric:  Anxiety denies. Depressed mood denies.     Objective:   Vital Signs: There were no vitals filed for this visit.     Physical Exam      General Examination:     Constitutional: The patient is alert and oriented to lace person and time. Mood is pleasant.     Head/Face: Normal facial features normal eyebrows    Eyes: Normal extraocular motion bilaterally    Lungs: Respirations are equal and unlabored    Gait is coordinated.    Cardiovascular: There are no swelling or varicosities present.    Lymphatic: Negative for adenopathy    Skin: Normal    Neurological: Level of consciousness normal. Oriented to place person and time and situation    Psychiatric: Oriented to time place person and situation    Bilateral knee exam: Skin to bilateral knees is clean dry and intact.  There is no erythema or ecchymosis bilaterally.  There are no signs or symptoms of infection bilaterally.  Patient is neurovascularly intact throughout bilateral lower extremities.  He can weightbear as tolerated on bilateral lower extremities.  Bilateral calves are soft and nontender.  Right knee range of motion 0-100 degrees.  Left knee range of motion 0-110 degrees.  Both knees are stable to varus and valgus stresses while held in extension.  He has a negative straight leg raise bilaterally.  He is diffusely tender to palpation along the medial aspect of bilateral knees.  He does have mild crepitus with range of motioning of bilateral knees.  He has a 1+ effusion bilaterally.  He does have a mildly antalgic gait.      XRAY Report/ Interpretation:  No new radiographs were taken on today's clinic visit.  However, did review previous radiographs for proper documentation in today's no for viscosupplementation authorization.  I did review two  "views of the bilateral knees from October 2022: AP and lateral views.  For the right knee, patient has severe arthrosis in the medial compartment with bone-on-bone deformity.  Would be Kellgren Edilson grading scale of grade 4.  For the left knee, patient has moderate-severe arthrosis in the medial compartment of the left knee.  Would be Kellgren Edilson grading scale 3-4.  I do not appreciate any acute fractures or dislocations bilaterally.  Visualized soft tissues appear unremarkable bilaterally.    Assessment:       1. Primary osteoarthritis of right knee    2. Primary osteoarthritis of left knee        Plan:       Trino was seen today for pain and pain.    Diagnoses and all orders for this visit:    Primary osteoarthritis of right knee  -     Large Joint Aspiration/Injection: R knee  -     Prior authorization Order    Primary osteoarthritis of left knee  -     Large Joint Aspiration/Injection: L knee  -     Prior authorization Order         Follow up in about 2 weeks (around 5/10/2023) for F/U BL Synvisc-One Inj.  This is to attest that the physician's assistant Mata Kwon served in the capacity as a scribe" for this patient encounter.  This is also to verify that I have reviewed the patient's history and helped formulate the treatment plan and discussed it with the physician's assistant.  I have actively participated in the evaluation and treatment plan for this patient is visit.  The treatment plan and medical decision-making is outlined below:  I injected the patient's bilateral knees today via an anterior lateral approach with 40 mg of Kenalog and lidocaine respectively.  He tolerated these well.  We will work towards trying to obtain authorization for bilateral knee viscosupplementation.  We will see him back once those are approved.  Ultimately, we are trying to buy the patient some time and relief for his bilateral knee pain secondary to osteoarthritis.  Ultimately, he will likely need total knee " arthroplasties at some point in the future.    Treatment options were discussed with regards to the nature of the medical condition. Conservative pain intervention and surgical options were discussed in detail. The probability of success of each separate treatment option was discussed. The patient expressed a clear understanding of the treatment options. With regards to surgery, the procedure risk, benefits, complications, and outcomes were discussed. No guarantees were given with regards to surgical outcome.   The risk of complications, morbidity, and mortality of patient management decisions have been made at the time of this visit. These are associated with the patient's problems, diagnostic procedures and treatment options. This includes the possible management options selected and those considered but not selected by the patient after shared medical decision making we discussed with the patient.     This note was created using Dragon voice recognition software that occasionally misinterpreted phrases or words.

## 2023-04-26 NOTE — PROCEDURES
Large Joint Aspiration/Injection: L knee    Date/Time: 4/26/2023 9:45 AM  Performed by: Martin Vo MD  Authorized by: Martin Vo MD     Consent Done?:  Yes (Verbal)  Indications:  Pain and arthritis  Site marked: the procedure site was marked    Timeout: prior to procedure the correct patient, procedure, and site was verified    Prep: patient was prepped and draped in usual sterile fashion      Local anesthesia used?: Yes    Local anesthetic:  Lidocaine 1% without epinephrine    Details:  Needle Size:  25 G  Ultrasonic Guidance for needle placement?: No    Approach:  Anterolateral  Location:  Knee  Site:  L knee  Medications:  40 mg triamcinolone acetonide 40 mg/mL  Patient tolerance:  Patient tolerated the procedure well with no immediate complications

## 2023-04-26 NOTE — PROCEDURES
Large Joint Aspiration/Injection: R knee    Date/Time: 4/26/2023 9:45 AM  Performed by: Martin Vo MD  Authorized by: Martin Vo MD     Consent Done?:  Yes (Verbal)  Indications:  Pain and arthritis  Site marked: the procedure site was marked    Timeout: prior to procedure the correct patient, procedure, and site was verified    Prep: patient was prepped and draped in usual sterile fashion      Local anesthesia used?: Yes    Local anesthetic:  Lidocaine 1% without epinephrine    Details:  Needle Size:  25 G  Ultrasonic Guidance for needle placement?: No    Approach:  Anterolateral  Location:  Knee  Site:  R knee  Medications:  40 mg triamcinolone acetonide 40 mg/mL  Patient tolerance:  Patient tolerated the procedure well with no immediate complications

## 2023-05-05 ENCOUNTER — PATIENT MESSAGE (OUTPATIENT)
Dept: FAMILY MEDICINE | Facility: CLINIC | Age: 58
End: 2023-05-05

## 2023-05-05 DIAGNOSIS — K21.9 GASTROESOPHAGEAL REFLUX DISEASE WITHOUT ESOPHAGITIS: ICD-10-CM

## 2023-05-05 RX ORDER — OMEPRAZOLE 40 MG/1
40 CAPSULE, DELAYED RELEASE ORAL DAILY
Qty: 90 CAPSULE | Refills: 3 | Status: SHIPPED | OUTPATIENT
Start: 2023-05-05 | End: 2023-06-14 | Stop reason: SDUPTHER

## 2023-05-09 ENCOUNTER — PATIENT MESSAGE (OUTPATIENT)
Dept: ORTHOPEDICS | Facility: CLINIC | Age: 58
End: 2023-05-09

## 2023-05-22 RX ORDER — LOSARTAN POTASSIUM 50 MG/1
50 TABLET ORAL DAILY
Qty: 90 TABLET | Refills: 3 | Status: SHIPPED | OUTPATIENT
Start: 2023-05-22

## 2023-05-31 ENCOUNTER — PATIENT MESSAGE (OUTPATIENT)
Dept: FAMILY MEDICINE | Facility: CLINIC | Age: 58
End: 2023-05-31

## 2023-05-31 DIAGNOSIS — E78.00 PURE HYPERCHOLESTEROLEMIA: ICD-10-CM

## 2023-05-31 DIAGNOSIS — I10 PRIMARY HYPERTENSION: ICD-10-CM

## 2023-05-31 DIAGNOSIS — Z00.00 WELLNESS EXAMINATION: Primary | ICD-10-CM

## 2023-05-31 DIAGNOSIS — Z00.00 ROUTINE GENERAL MEDICAL EXAMINATION AT A HEALTH CARE FACILITY: ICD-10-CM

## 2023-06-08 LAB
ALBUMIN SERPL-MCNC: 4 G/DL (ref 3.6–5.1)
ALBUMIN/CREAT UR: 3 MCG/MG CREAT
ALBUMIN/GLOB SERPL: 1.8 (CALC) (ref 1–2.5)
ALP SERPL-CCNC: 52 U/L (ref 35–144)
ALT SERPL-CCNC: 24 U/L (ref 9–46)
APPEARANCE UR: ABNORMAL
AST SERPL-CCNC: 17 U/L (ref 10–35)
BACTERIA #/AREA URNS HPF: ABNORMAL /HPF
BACTERIA UR CULT: ABNORMAL
BASOPHILS # BLD AUTO: 40 CELLS/UL (ref 0–200)
BASOPHILS NFR BLD AUTO: 1 %
BILIRUB SERPL-MCNC: 0.5 MG/DL (ref 0.2–1.2)
BILIRUB UR QL STRIP: NEGATIVE
BUN SERPL-MCNC: 19 MG/DL (ref 7–25)
BUN/CREAT SERPL: NORMAL (CALC) (ref 6–22)
CALCIUM SERPL-MCNC: 9.3 MG/DL (ref 8.6–10.3)
CAOX CRY #/AREA URNS HPF: ABNORMAL /HPF
CHLORIDE SERPL-SCNC: 107 MMOL/L (ref 98–110)
CHOLEST SERPL-MCNC: 258 MG/DL
CHOLEST/HDLC SERPL: 3.9 (CALC)
CO2 SERPL-SCNC: 29 MMOL/L (ref 20–32)
COLOR UR: YELLOW
CREAT SERPL-MCNC: 0.96 MG/DL (ref 0.7–1.3)
CREAT UR-MCNC: 270 MG/DL (ref 20–320)
EGFR: 92 ML/MIN/1.73M2
EOSINOPHIL # BLD AUTO: 20 CELLS/UL (ref 15–500)
EOSINOPHIL NFR BLD AUTO: 0.5 %
ERYTHROCYTE [DISTWIDTH] IN BLOOD BY AUTOMATED COUNT: 13.1 % (ref 11–15)
GLOBULIN SER CALC-MCNC: 2.2 G/DL (CALC) (ref 1.9–3.7)
GLUCOSE SERPL-MCNC: 95 MG/DL (ref 65–99)
GLUCOSE UR QL STRIP: NEGATIVE
HCT VFR BLD AUTO: 44.3 % (ref 38.5–50)
HDLC SERPL-MCNC: 67 MG/DL
HGB BLD-MCNC: 14.5 G/DL (ref 13.2–17.1)
HGB UR QL STRIP: NEGATIVE
HYALINE CASTS #/AREA URNS LPF: ABNORMAL /LPF
KETONES UR QL STRIP: NEGATIVE
LDLC SERPL CALC-MCNC: 177 MG/DL (CALC)
LEUKOCYTE ESTERASE UR QL STRIP: NEGATIVE
LYMPHOCYTES # BLD AUTO: 1396 CELLS/UL (ref 850–3900)
LYMPHOCYTES NFR BLD AUTO: 34.9 %
MCH RBC QN AUTO: 31.5 PG (ref 27–33)
MCHC RBC AUTO-ENTMCNC: 32.7 G/DL (ref 32–36)
MCV RBC AUTO: 96.1 FL (ref 80–100)
MICROALBUMIN UR-MCNC: 0.7 MG/DL
MONOCYTES # BLD AUTO: 536 CELLS/UL (ref 200–950)
MONOCYTES NFR BLD AUTO: 13.4 %
NEUTROPHILS # BLD AUTO: 2008 CELLS/UL (ref 1500–7800)
NEUTROPHILS NFR BLD AUTO: 50.2 %
NITRITE UR QL STRIP: NEGATIVE
NONHDLC SERPL-MCNC: 191 MG/DL (CALC)
PH UR STRIP: ABNORMAL [PH] (ref 5–8)
PLATELET # BLD AUTO: 287 THOUSAND/UL (ref 140–400)
PMV BLD REES-ECKER: 10.5 FL (ref 7.5–12.5)
POTASSIUM SERPL-SCNC: 4.1 MMOL/L (ref 3.5–5.3)
PROT SERPL-MCNC: 6.2 G/DL (ref 6.1–8.1)
PROT UR QL STRIP: NEGATIVE
RBC # BLD AUTO: 4.61 MILLION/UL (ref 4.2–5.8)
RBC #/AREA URNS HPF: ABNORMAL /HPF
SERVICE CMNT-IMP: ABNORMAL
SODIUM SERPL-SCNC: 141 MMOL/L (ref 135–146)
SP GR UR STRIP: 1.03 (ref 1–1.03)
SQUAMOUS #/AREA URNS HPF: ABNORMAL /HPF
TRIGL SERPL-MCNC: 52 MG/DL
TSH SERPL-ACNC: 1.66 MIU/L (ref 0.4–4.5)
URATE CRY #/AREA URNS HPF: ABNORMAL /HPF
WBC # BLD AUTO: 4 THOUSAND/UL (ref 3.8–10.8)
WBC #/AREA URNS HPF: ABNORMAL /HPF

## 2023-06-14 ENCOUNTER — OFFICE VISIT (OUTPATIENT)
Dept: FAMILY MEDICINE | Facility: CLINIC | Age: 58
End: 2023-06-14
Payer: COMMERCIAL

## 2023-06-14 VITALS
SYSTOLIC BLOOD PRESSURE: 138 MMHG | DIASTOLIC BLOOD PRESSURE: 88 MMHG | HEART RATE: 54 BPM | HEIGHT: 68 IN | BODY MASS INDEX: 34.4 KG/M2 | WEIGHT: 227 LBS

## 2023-06-14 DIAGNOSIS — G25.2 INTENTION TREMOR: ICD-10-CM

## 2023-06-14 DIAGNOSIS — M17.0 OSTEOARTHRITIS OF BOTH KNEES, UNSPECIFIED OSTEOARTHRITIS TYPE: ICD-10-CM

## 2023-06-14 DIAGNOSIS — K21.9 GASTROESOPHAGEAL REFLUX DISEASE WITHOUT ESOPHAGITIS: ICD-10-CM

## 2023-06-14 DIAGNOSIS — I10 HYPERTENSION, UNSPECIFIED TYPE: ICD-10-CM

## 2023-06-14 DIAGNOSIS — H61.23 BILATERAL IMPACTED CERUMEN: ICD-10-CM

## 2023-06-14 DIAGNOSIS — E78.00 PURE HYPERCHOLESTEROLEMIA: Primary | ICD-10-CM

## 2023-06-14 DIAGNOSIS — Z12.5 ENCOUNTER FOR SCREENING FOR MALIGNANT NEOPLASM OF PROSTATE: ICD-10-CM

## 2023-06-14 PROCEDURE — 4010F PR ACE/ARB THEARPY RXD/TAKEN: ICD-10-PCS | Mod: CPTII,S$GLB,, | Performed by: FAMILY MEDICINE

## 2023-06-14 PROCEDURE — 1160F PR REVIEW ALL MEDS BY PRESCRIBER/CLIN PHARMACIST DOCUMENTED: ICD-10-PCS | Mod: CPTII,S$GLB,, | Performed by: FAMILY MEDICINE

## 2023-06-14 PROCEDURE — 3079F DIAST BP 80-89 MM HG: CPT | Mod: CPTII,S$GLB,, | Performed by: FAMILY MEDICINE

## 2023-06-14 PROCEDURE — 3061F NEG MICROALBUMINURIA REV: CPT | Mod: CPTII,S$GLB,, | Performed by: FAMILY MEDICINE

## 2023-06-14 PROCEDURE — 3008F BODY MASS INDEX DOCD: CPT | Mod: CPTII,S$GLB,, | Performed by: FAMILY MEDICINE

## 2023-06-14 PROCEDURE — 3075F PR MOST RECENT SYSTOLIC BLOOD PRESS GE 130-139MM HG: ICD-10-PCS | Mod: CPTII,S$GLB,, | Performed by: FAMILY MEDICINE

## 2023-06-14 PROCEDURE — 1159F PR MEDICATION LIST DOCUMENTED IN MEDICAL RECORD: ICD-10-PCS | Mod: CPTII,S$GLB,, | Performed by: FAMILY MEDICINE

## 2023-06-14 PROCEDURE — 69209 EAR CERUMEN REMOVAL: ICD-10-PCS | Mod: 50,S$GLB,, | Performed by: FAMILY MEDICINE

## 2023-06-14 PROCEDURE — 69209 REMOVE IMPACTED EAR WAX UNI: CPT | Mod: 50,S$GLB,, | Performed by: FAMILY MEDICINE

## 2023-06-14 PROCEDURE — 99214 OFFICE O/P EST MOD 30 MIN: CPT | Mod: 25,S$GLB,, | Performed by: FAMILY MEDICINE

## 2023-06-14 PROCEDURE — 1160F RVW MEDS BY RX/DR IN RCRD: CPT | Mod: CPTII,S$GLB,, | Performed by: FAMILY MEDICINE

## 2023-06-14 PROCEDURE — 3008F PR BODY MASS INDEX (BMI) DOCUMENTED: ICD-10-PCS | Mod: CPTII,S$GLB,, | Performed by: FAMILY MEDICINE

## 2023-06-14 PROCEDURE — 99214 PR OFFICE/OUTPT VISIT, EST, LEVL IV, 30-39 MIN: ICD-10-PCS | Mod: 25,S$GLB,, | Performed by: FAMILY MEDICINE

## 2023-06-14 PROCEDURE — 3079F PR MOST RECENT DIASTOLIC BLOOD PRESSURE 80-89 MM HG: ICD-10-PCS | Mod: CPTII,S$GLB,, | Performed by: FAMILY MEDICINE

## 2023-06-14 PROCEDURE — 3075F SYST BP GE 130 - 139MM HG: CPT | Mod: CPTII,S$GLB,, | Performed by: FAMILY MEDICINE

## 2023-06-14 PROCEDURE — 3066F PR DOCUMENTATION OF TREATMENT FOR NEPHROPATHY: ICD-10-PCS | Mod: CPTII,S$GLB,, | Performed by: FAMILY MEDICINE

## 2023-06-14 PROCEDURE — 3066F NEPHROPATHY DOC TX: CPT | Mod: CPTII,S$GLB,, | Performed by: FAMILY MEDICINE

## 2023-06-14 PROCEDURE — 4010F ACE/ARB THERAPY RXD/TAKEN: CPT | Mod: CPTII,S$GLB,, | Performed by: FAMILY MEDICINE

## 2023-06-14 PROCEDURE — 1159F MED LIST DOCD IN RCRD: CPT | Mod: CPTII,S$GLB,, | Performed by: FAMILY MEDICINE

## 2023-06-14 PROCEDURE — 3061F PR NEG MICROALBUMINURIA RESULT DOCUMENTED/REVIEW: ICD-10-PCS | Mod: CPTII,S$GLB,, | Performed by: FAMILY MEDICINE

## 2023-06-14 RX ORDER — ATORVASTATIN CALCIUM 40 MG/1
40 TABLET, FILM COATED ORAL NIGHTLY
Qty: 90 TABLET | Refills: 3 | Status: SHIPPED | OUTPATIENT
Start: 2023-06-14 | End: 2023-12-20

## 2023-06-14 RX ORDER — MELOXICAM 15 MG/1
15 TABLET ORAL DAILY PRN
Qty: 90 TABLET | Refills: 2 | Status: SHIPPED | OUTPATIENT
Start: 2023-06-14

## 2023-06-14 RX ORDER — OMEPRAZOLE 40 MG/1
40 CAPSULE, DELAYED RELEASE ORAL DAILY
Qty: 90 CAPSULE | Refills: 3 | Status: SHIPPED | OUTPATIENT
Start: 2023-06-14 | End: 2024-06-13

## 2023-06-14 RX ORDER — PROPRANOLOL HYDROCHLORIDE 40 MG/1
80 TABLET ORAL DAILY
Qty: 180 TABLET | Refills: 1 | Status: SHIPPED | OUTPATIENT
Start: 2023-06-14 | End: 2023-12-20 | Stop reason: SDUPTHER

## 2023-06-14 NOTE — PROGRESS NOTES
"  SUBJECTIVE:    Patient ID: Trino Head is a 58 y.o. male.    Chief Complaint: Annual Exam (Went over medications verbally, need refill, review Lab-results, no complaints, fatigue, abc )    Patient is a 58-year-old male who presents to clinic today for a routine checkup.  Patient has history of high cholesterol, intention tremors, GERD, arthritis, and high blood pressure.  Patient reports today that he takes propranolol and losartan for his blood pressure and his intention tremors; patient takes propranolol 80 mg every morning and losartan 50 mg every evening.  Patient states he finds that the propranolol is making him very sleepy especially on his our long ride to work in the morning.  Blood pressure today 138/88, tremor well controlled.    Patient also reports today is that he occasionally has "Charley horse" cramps in his legs sometimes at night.  He does state that he probably does not drink enough water.  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  Patient does sweat a lot throughout his day.    Patient does suffer with intermittent multiple joint arthritis pain, but states his knees have been bothering him the most recently.  Patient does see  with orthopedics.    Patient is also followed by Dermatology annually with .    Patient wears glasses, sees  annually.    Reports today that his patient tells him that he is not hearing as well as he used to.  Patient does agree that maybe his hearing has been a little diminished lately.    Patient states he knows that he should try to eat healthier foods.  He will attempt to increase his oral fluid intake.  He will work on increasing his activity level outside of his occupation.    No trouble asleep.    It has been nearly a year since patient had a PSA screening.  Patient is due for colonoscopy in 2025.  Patient is a nonsmoker and does not drink " alcohol.  He is up-to-date on all of his vaccinations currently.        Patient Message on 05/31/2023   Component Date Value Ref Range Status    WBC 06/07/2023 4.0  3.8 - 10.8 Thousand/uL Final    RBC 06/07/2023 4.61  4.20 - 5.80 Million/uL Final    Hemoglobin 06/07/2023 14.5  13.2 - 17.1 g/dL Final    Hematocrit 06/07/2023 44.3  38.5 - 50.0 % Final    MCV 06/07/2023 96.1  80.0 - 100.0 fL Final    MCH 06/07/2023 31.5  27.0 - 33.0 pg Final    MCHC 06/07/2023 32.7  32.0 - 36.0 g/dL Final    RDW 06/07/2023 13.1  11.0 - 15.0 % Final    Platelets 06/07/2023 287  140 - 400 Thousand/uL Final    MPV 06/07/2023 10.5  7.5 - 12.5 fL Final    Neutrophils, Abs 06/07/2023 2,008  1,500 - 7,800 cells/uL Final    Lymph # 06/07/2023 1,396  850 - 3,900 cells/uL Final    Mono # 06/07/2023 536  200 - 950 cells/uL Final    Eos # 06/07/2023 20  15 - 500 cells/uL Final    Baso # 06/07/2023 40  0 - 200 cells/uL Final    Neutrophils Relative 06/07/2023 50.2  % Final    Lymph % 06/07/2023 34.9  % Final    Mono % 06/07/2023 13.4  % Final    Eosinophil % 06/07/2023 0.5  % Final    Basophil % 06/07/2023 1.0  % Final    Glucose 06/07/2023 95  65 - 99 mg/dL Final    BUN 06/07/2023 19  7 - 25 mg/dL Final    Creatinine 06/07/2023 0.96  0.70 - 1.30 mg/dL Final    eGFR 06/07/2023 92  > OR = 60 mL/min/1.73m2 Final    BUN/Creatinine Ratio 06/07/2023 NOT APPLICABLE  6 - 22 (calc) Final    Sodium 06/07/2023 141  135 - 146 mmol/L Final    Potassium 06/07/2023 4.1  3.5 - 5.3 mmol/L Final    Chloride 06/07/2023 107  98 - 110 mmol/L Final    CO2 06/07/2023 29  20 - 32 mmol/L Final    Calcium 06/07/2023 9.3  8.6 - 10.3 mg/dL Final    Total Protein 06/07/2023 6.2  6.1 - 8.1 g/dL Final    Albumin 06/07/2023 4.0  3.6 - 5.1 g/dL Final    Globulin, Total 06/07/2023 2.2  1.9 - 3.7 g/dL (calc) Final    Albumin/Globulin Ratio 06/07/2023 1.8  1.0 - 2.5 (calc) Final    Total Bilirubin 06/07/2023 0.5  0.2 - 1.2 mg/dL Final    Alkaline Phosphatase 06/07/2023 52  35 - 144  U/L Final    AST 06/07/2023 17  10 - 35 U/L Final    ALT 06/07/2023 24  9 - 46 U/L Final    Cholesterol 06/07/2023 258 (H)  <200 mg/dL Final    HDL 06/07/2023 67  > OR = 40 mg/dL Final    Triglycerides 06/07/2023 52  <150 mg/dL Final    LDL Cholesterol 06/07/2023 177 (H)  mg/dL (calc) Final    HDL/Cholesterol Ratio 06/07/2023 3.9  <5.0 (calc) Final    Non HDL Chol. (LDL+VLDL) 06/07/2023 191 (H)  <130 mg/dL (calc) Final    Creatinine, Urine 06/07/2023 270  20 - 320 mg/dL Final    Microalb, Ur 06/07/2023 0.7  See Note: mg/dL Final    Microalb/Creat Ratio 06/07/2023 3  <30 mcg/mg creat Final    TSH w/reflex to FT4 06/07/2023 1.66  0.40 - 4.50 mIU/L Final    Color, UA 06/07/2023 YELLOW  YELLOW Final    Appearance, UA 06/07/2023 TURBID (A)  CLEAR Final    Specific Gravity, UA 06/07/2023 1.029  1.001 - 1.035 Final    pH, UA 06/07/2023 < OR = 5.0  5.0 - 8.0 Final    Glucose, UA 06/07/2023 NEGATIVE  NEGATIVE Final    Bilirubin, UA 06/07/2023 NEGATIVE  NEGATIVE Final    Ketones, UA 06/07/2023 NEGATIVE  NEGATIVE Final    Occult Blood UA 06/07/2023 NEGATIVE  NEGATIVE Final    Protein, UA 06/07/2023 NEGATIVE  NEGATIVE Final    Nitrite, UA 06/07/2023 NEGATIVE  NEGATIVE Final    Leukocytes, UA 06/07/2023 NEGATIVE  NEGATIVE Final    WBC Casts, UA 06/07/2023 NONE SEEN  < OR = 5 /HPF Final    RBC Casts, UA 06/07/2023 NONE SEEN  < OR = 2 /HPF Final    Squam Epithel, UA 06/07/2023 NONE SEEN  < OR = 5 /HPF Final    Bacteria, UA 06/07/2023 NONE SEEN  NONE SEEN /HPF Final    Ca Oxalate Debby, UA 06/07/2023 MANY (A)  NONE OR FEW /HPF Final    Uric Acid Debby, UA 06/07/2023 FEW  NONE OR FEW /HPF Final    Hyaline Casts, UA 06/07/2023 NONE SEEN  NONE SEEN /LPF Final    Service Cmt: 06/07/2023    Final    Reflexive Urine Culture 06/07/2023    Final       History reviewed. No pertinent past medical history.  Social History     Socioeconomic History    Marital status:    Tobacco Use    Smoking status: Never    Smokeless tobacco: Never    Substance and Sexual Activity    Alcohol use: Yes     Alcohol/week: 1.0 standard drink     Types: 1 Cans of beer per week    Drug use: Never    Sexual activity: Yes     Partners: Female     Birth control/protection: None     Social Determinants of Health     Financial Resource Strain: Unknown    Difficulty of Paying Living Expenses: Patient refused   Food Insecurity: Unknown    Worried About Running Out of Food in the Last Year: Patient refused    Ran Out of Food in the Last Year: Patient refused   Transportation Needs: Unknown    Lack of Transportation (Medical): Patient refused    Lack of Transportation (Non-Medical): Patient refused   Physical Activity: Insufficiently Active    Days of Exercise per Week: 2 days    Minutes of Exercise per Session: 30 min   Stress: Unknown    Feeling of Stress : Patient refused   Social Connections: Unknown    Frequency of Communication with Friends and Family: Patient refused    Frequency of Social Gatherings with Friends and Family: Patient refused    Active Member of Clubs or Organizations: Patient refused    Attends Club or Organization Meetings: Patient refused    Marital Status:    Housing Stability: Unknown    Unable to Pay for Housing in the Last Year: Patient refused    Unstable Housing in the Last Year: Patient refused     Past Surgical History:   Procedure Laterality Date    COLONOSCOPY  2016    Dr. Randolph 10 years    VASECTOMY  2012     History reviewed. No pertinent family history.    Review of patient's allergies indicates:  No Known Allergies    Current Outpatient Medications:     ascorbic acid, vitamin C, (VITAMIN C) 1000 MG tablet, Take 1,000 mg by mouth once daily., Disp: , Rfl:     aspirin (ECOTRIN) 81 MG EC tablet, Take 81 mg by mouth once daily., Disp: , Rfl:     cholecalciferol, vitamin D3, (VITAMIN D3) 25 mcg (1,000 unit) capsule, Take 1,000 Units by mouth once daily., Disp: , Rfl:     cyanocobalamin (VITAMIN B-12) 1000 MCG tablet, Take 100 mcg  by mouth once daily., Disp: , Rfl:     flaxseed oil 1,000 mg Cap, Take by mouth., Disp: , Rfl:     ginkgo biloba 60 mg Cap, Take by mouth., Disp: , Rfl:     gluc hull/chondro hull A/vit C/Mn (GLUCOSAMINE 1500 COMPLEX ORAL), Take by mouth., Disp: , Rfl:     losartan (COZAAR) 50 MG tablet, Take 1 tablet (50 mg total) by mouth once daily., Disp: 90 tablet, Rfl: 3    multivit-min/FA/lycopen/lutein (CENTRUM SILVER MEN ORAL), Take by mouth., Disp: , Rfl:     omega 3-dha-epa-fish oil 1,000 mg (120 mg-180 mg) Cap, Take by mouth., Disp: , Rfl:     TURMERIC ORAL, Take by mouth., Disp: , Rfl:     vitamin E 400 UNIT capsule, Take 400 Units by mouth once daily., Disp: , Rfl:     atorvastatin (LIPITOR) 40 MG tablet, Take 1 tablet (40 mg total) by mouth every evening., Disp: 90 tablet, Rfl: 3    meloxicam (MOBIC) 15 MG tablet, Take 1 tablet (15 mg total) by mouth daily as needed for Pain., Disp: 90 tablet, Rfl: 2    omeprazole (PRILOSEC) 40 MG capsule, Take 1 capsule (40 mg total) by mouth once daily., Disp: 90 capsule, Rfl: 3    propranoloL (INDERAL) 40 MG tablet, Take 2 tablets (80 mg total) by mouth once daily., Disp: 180 tablet, Rfl: 1    Review of Systems   Constitutional:  Negative for activity change, appetite change, chills, fatigue, fever and unexpected weight change.   HENT:  Negative for ear pain, sore throat and trouble swallowing.    Eyes:  Negative for pain, discharge and visual disturbance.   Respiratory:  Negative for apnea, cough, shortness of breath and wheezing.    Cardiovascular:  Negative for chest pain and leg swelling.   Gastrointestinal:  Positive for reflux. Negative for abdominal pain, blood in stool, constipation, diarrhea, nausea and vomiting.   Genitourinary:  Negative for bladder incontinence, dysuria, frequency, hematuria and urgency.   Musculoskeletal:  Positive for arthralgias. Negative for gait problem, joint swelling and myalgias.   Integumentary:  Negative for rash and mole/lesion.   Neurological:   "Negative for dizziness, tremors, numbness and headaches.        No tremors as long as patient takes propranolol   Hematological:  Does not bruise/bleed easily.   Psychiatric/Behavioral:  Negative for agitation, behavioral problems and hallucinations. The patient is not nervous/anxious.          Objective:      Vitals:    06/14/23 0851   BP: 138/88   Pulse: (!) 54   Weight: 103 kg (227 lb)   Height: 5' 8" (1.727 m)     Physical Exam  Vitals and nursing note reviewed.   Constitutional:       Appearance: Normal appearance. He is well-developed.      Comments: Overweight   HENT:      Head: Normocephalic and atraumatic.      Comments: Bilateral ear canals impacted with dark brown, dry cerumen     Right Ear: External ear normal. There is impacted cerumen.      Left Ear: External ear normal. There is impacted cerumen.      Nose: Nose normal. No congestion or rhinorrhea.      Mouth/Throat:      Mouth: Mucous membranes are moist.      Pharynx: Oropharynx is clear. No oropharyngeal exudate or posterior oropharyngeal erythema.   Eyes:      General:         Right eye: No discharge.         Left eye: No discharge.      Conjunctiva/sclera: Conjunctivae normal.      Pupils: Pupils are equal, round, and reactive to light.   Neck:      Thyroid: No thyromegaly.      Vascular: No carotid bruit.   Cardiovascular:      Rate and Rhythm: Normal rate and regular rhythm.      Pulses: Normal pulses.      Heart sounds: Normal heart sounds. No murmur heard.  Pulmonary:      Effort: Pulmonary effort is normal.      Breath sounds: Normal breath sounds. No wheezing or rales.   Abdominal:      General: Bowel sounds are normal. There is no distension.      Palpations: Abdomen is soft.      Tenderness: There is no abdominal tenderness.   Musculoskeletal:         General: No tenderness or deformity. Normal range of motion.      Cervical back: Normal range of motion and neck supple.      Lumbar back: Normal. No spasms.      Comments: Good range of " motion throughout.  Crepitance to bilateral knees.  Patient denies dysfunction.  Some arthritis pain in bilateral shoulders especially when lifting arms overhead.   Lymphadenopathy:      Cervical: No cervical adenopathy.   Skin:     General: Skin is warm and dry.      Capillary Refill: Capillary refill takes less than 2 seconds.      Coloration: Skin is not jaundiced.      Findings: No rash.   Neurological:      General: No focal deficit present.      Mental Status: He is alert and oriented to person, place, and time.      Cranial Nerves: No cranial nerve deficit.      Coordination: Coordination normal.   Psychiatric:         Mood and Affect: Mood normal.         Behavior: Behavior normal.         Thought Content: Thought content normal.         Judgment: Judgment normal.         Assessment:       1. Pure hypercholesterolemia    2. Intention tremor    3. Gastroesophageal reflux disease without esophagitis    4. Hypertension, unspecified type    5. Osteoarthritis of both knees, unspecified osteoarthritis type         Plan:       Pure hypercholesterolemia  Labs reviewed today.  Cholesterol 258; HDL 67; triglycerides 52; .  ASCVD 10 year risk calculated to be 9.9% discussed this with patient along with other risk factors.  Patient agrees with plan to begin moderate intensity statin and recommended by the NATHALIA guidelines to reduce his risk of cardiovascular events.  Discussed reducing carbs and saturated fats in diet.  Cardiac exercise several times a week ideally for 1 hour.  Recheck lipids in 6 months.  Discussed possible side effects of medication with patient, patient verbalizes understanding.  -     atorvastatin (LIPITOR) 40 MG tablet; Take 1 tablet (40 mg total) by mouth every evening.  Dispense: 90 tablet; Refill: 3    Intention tremor  Discussed sleepiness in the morning after taking propranolol.  Instructed patient to begin taking propranolol at current dose at bedtime as this could be the cause of his  daytime sleepiness.  Advised to begin taking losartan in the morning.  Patient denies symptoms of sleep apnea.  States he has no problems sleeping.  -     propranoloL (INDERAL) 40 MG tablet; Take 2 tablets (80 mg total) by mouth once daily.  Dispense: 180 tablet; Refill: 1    Gastroesophageal reflux disease without esophagitis  Patient reports occasional cramping in legs at night.  Discussed with patient this could be possible side effects of omeprazole.  Instructed patient that he can take a vacation from omeprazole and see if this relieves his discomfort.  He can try over-the-counter Pepcid for acid reflux control.  Reserve omeprazole for severe symptoms of GERD.  -     omeprazole (PRILOSEC) 40 MG capsule; Take 1 capsule (40 mg total) by mouth once daily.  Dispense: 90 capsule; Refill: 3    Hypertension, unspecified type  Continue losartan at current dose.    Osteoarthritis of both knees, unspecified osteoarthritis type  Patient reports that he is not as physically active as he would like due to arthritis pain, mostly in his knees.  We will continue meloxicam p.r.n. to allow patient to manage arthritis pain so that he may participate in adequate cardiac activity for a healthy lifestyle.  Advised patient not to take any ibuprofen or naproxen while taking this medication.  Advised patient this medication can cause irritation in his stomach.  If his GERD worsens.  Medication and notify provider.  -     meloxicam (MOBIC) 15 MG tablet; Take 1 tablet (15 mg total) by mouth daily as needed for Pain.  Dispense: 90 tablet; Refill: 2      No follow-ups on file.        6/14/2023 Courtney Brewer

## 2023-06-15 NOTE — PROCEDURES
"Ear Cerumen Removal    Date/Time: 6/14/2023 9:00 AM  Performed by: Titus Graham MD  Authorized by: Titus Graham MD     Time out: Immediately prior to procedure a "time out" was called to verify the correct patient, procedure, equipment, support staff and site/side marked as required.    Consent Done?:  Yes (Verbal)    Local anesthetic:  None  Location details:  Both ears  Procedure type: irrigation    Cerumen  Removal Results:  Cerumen partially removed  Patient tolerance:  Patient tolerated the procedure well with no immediate complications  "

## 2023-09-20 ENCOUNTER — OFFICE VISIT (OUTPATIENT)
Dept: ORTHOPEDICS | Facility: CLINIC | Age: 58
End: 2023-09-20
Payer: COMMERCIAL

## 2023-09-20 VITALS — HEIGHT: 68 IN | BODY MASS INDEX: 34.4 KG/M2 | WEIGHT: 227 LBS

## 2023-09-20 DIAGNOSIS — M17.12 PRIMARY OSTEOARTHRITIS OF LEFT KNEE: ICD-10-CM

## 2023-09-20 DIAGNOSIS — M75.42 SHOULDER IMPINGEMENT SYNDROME, LEFT: ICD-10-CM

## 2023-09-20 DIAGNOSIS — M17.11 PRIMARY OSTEOARTHRITIS OF RIGHT KNEE: ICD-10-CM

## 2023-09-20 DIAGNOSIS — M75.101 TEAR OF RIGHT ROTATOR CUFF, UNSPECIFIED TEAR EXTENT, UNSPECIFIED WHETHER TRAUMATIC: ICD-10-CM

## 2023-09-20 DIAGNOSIS — M19.042 OA (OSTEOARTHRITIS) OF FINGER, LEFT: ICD-10-CM

## 2023-09-20 DIAGNOSIS — M65.331 TRIGGER MIDDLE FINGER OF RIGHT HAND: Primary | ICD-10-CM

## 2023-09-20 DIAGNOSIS — M75.41 SHOULDER IMPINGEMENT SYNDROME, RIGHT: ICD-10-CM

## 2023-09-20 DIAGNOSIS — M75.122 COMPLETE TEAR OF LEFT ROTATOR CUFF, UNSPECIFIED WHETHER TRAUMATIC: ICD-10-CM

## 2023-09-20 PROCEDURE — 3061F PR NEG MICROALBUMINURIA RESULT DOCUMENTED/REVIEW: ICD-10-PCS | Mod: CPTII,S$GLB,, | Performed by: ORTHOPAEDIC SURGERY

## 2023-09-20 PROCEDURE — 20610 LARGE JOINT ASPIRATION/INJECTION: R KNEE: ICD-10-PCS | Mod: 50,S$GLB,, | Performed by: ORTHOPAEDIC SURGERY

## 2023-09-20 PROCEDURE — 4010F PR ACE/ARB THEARPY RXD/TAKEN: ICD-10-PCS | Mod: CPTII,S$GLB,, | Performed by: ORTHOPAEDIC SURGERY

## 2023-09-20 PROCEDURE — 3066F PR DOCUMENTATION OF TREATMENT FOR NEPHROPATHY: ICD-10-PCS | Mod: CPTII,S$GLB,, | Performed by: ORTHOPAEDIC SURGERY

## 2023-09-20 PROCEDURE — 1159F PR MEDICATION LIST DOCUMENTED IN MEDICAL RECORD: ICD-10-PCS | Mod: CPTII,S$GLB,, | Performed by: ORTHOPAEDIC SURGERY

## 2023-09-20 PROCEDURE — 99214 PR OFFICE/OUTPT VISIT, EST, LEVL IV, 30-39 MIN: ICD-10-PCS | Mod: 25,S$GLB,, | Performed by: ORTHOPAEDIC SURGERY

## 2023-09-20 PROCEDURE — 1160F PR REVIEW ALL MEDS BY PRESCRIBER/CLIN PHARMACIST DOCUMENTED: ICD-10-PCS | Mod: CPTII,S$GLB,, | Performed by: ORTHOPAEDIC SURGERY

## 2023-09-20 PROCEDURE — 1160F RVW MEDS BY RX/DR IN RCRD: CPT | Mod: CPTII,S$GLB,, | Performed by: ORTHOPAEDIC SURGERY

## 2023-09-20 PROCEDURE — 3008F PR BODY MASS INDEX (BMI) DOCUMENTED: ICD-10-PCS | Mod: CPTII,S$GLB,, | Performed by: ORTHOPAEDIC SURGERY

## 2023-09-20 PROCEDURE — 4010F ACE/ARB THERAPY RXD/TAKEN: CPT | Mod: CPTII,S$GLB,, | Performed by: ORTHOPAEDIC SURGERY

## 2023-09-20 PROCEDURE — 1159F MED LIST DOCD IN RCRD: CPT | Mod: CPTII,S$GLB,, | Performed by: ORTHOPAEDIC SURGERY

## 2023-09-20 PROCEDURE — 20610 DRAIN/INJ JOINT/BURSA W/O US: CPT | Mod: 50,S$GLB,, | Performed by: ORTHOPAEDIC SURGERY

## 2023-09-20 PROCEDURE — 99214 OFFICE O/P EST MOD 30 MIN: CPT | Mod: 25,S$GLB,, | Performed by: ORTHOPAEDIC SURGERY

## 2023-09-20 PROCEDURE — 3061F NEG MICROALBUMINURIA REV: CPT | Mod: CPTII,S$GLB,, | Performed by: ORTHOPAEDIC SURGERY

## 2023-09-20 PROCEDURE — 3008F BODY MASS INDEX DOCD: CPT | Mod: CPTII,S$GLB,, | Performed by: ORTHOPAEDIC SURGERY

## 2023-09-20 PROCEDURE — 3066F NEPHROPATHY DOC TX: CPT | Mod: CPTII,S$GLB,, | Performed by: ORTHOPAEDIC SURGERY

## 2023-09-20 RX ORDER — TRIAMCINOLONE ACETONIDE 40 MG/ML
40 INJECTION, SUSPENSION INTRA-ARTICULAR; INTRAMUSCULAR
Status: DISCONTINUED | OUTPATIENT
Start: 2023-09-20 | End: 2023-09-20 | Stop reason: HOSPADM

## 2023-09-20 RX ADMIN — TRIAMCINOLONE ACETONIDE 40 MG: 40 INJECTION, SUSPENSION INTRA-ARTICULAR; INTRAMUSCULAR at 08:09

## 2023-09-20 NOTE — PROCEDURES
Large Joint Aspiration/Injection: R knee    Date/Time: 9/20/2023 8:15 AM    Performed by: Martin Vo MD  Authorized by: Martin Vo MD    Consent Done?:  Yes (Verbal)  Indications:  Arthritis and pain  Site marked: the procedure site was marked    Timeout: prior to procedure the correct patient, procedure, and site was verified    Prep: patient was prepped and draped in usual sterile fashion      Local anesthesia used?: Yes    Local anesthetic:  Lidocaine 1% without epinephrine    Details:  Needle Size:  25 G  Ultrasonic Guidance for needle placement?: No    Location:  Knee  Site:  R knee  Medications:  40 mg triamcinolone acetonide 40 mg/mL  Patient tolerance:  Patient tolerated the procedure well with no immediate complications  Large Joint Aspiration/Injection: L knee    Date/Time: 9/20/2023 8:15 AM    Performed by: Martin Vo MD  Authorized by: Martin Vo MD    Consent Done?:  Yes (Verbal)  Indications:  Arthritis and pain  Site marked: the procedure site was marked    Timeout: prior to procedure the correct patient, procedure, and site was verified    Prep: patient was prepped and draped in usual sterile fashion      Local anesthesia used?: Yes    Local anesthetic:  Lidocaine 1% without epinephrine    Details:  Needle Size:  25 G  Ultrasonic Guidance for needle placement?: No    Location:  Knee  Site:  L knee  Medications:  40 mg triamcinolone acetonide 40 mg/mL  Patient tolerance:  Patient tolerated the procedure well with no immediate complications

## 2023-09-20 NOTE — PROGRESS NOTES
Subjective:       Patient ID: Trino Head is a 58 y.o. male.    Chief Complaint: Pain of the Left Knee (Patient is here for a f/up on bilateral knee pain, states his pain is worse than his last visit, injections only lasted 2 months. ), Pain of the Right Knee, Pain of the Right Hand (Patient states his hands have been hurting for 2 months, pointer finger joint pain, Right Hand middle finger joints are painful, feels out of place. ), Pain of the Left Hand, Pain of the Right Shoulder (Patient states his shoulder pain is better than his last visit, ROM is fair but painful, wakes him from sleep. ), and Pain of the Left Shoulder      History of Present Illness    Prior to meeting with the patient I reviewed the medical chart in [facility]. This included reviewing the previous progress notes from our office, review of the patient's last appointment with their primary care provider, review of any visits to the emergency room, and review of any pain management appointments or procedures.   Román comes in today for multiple orthopedic complaints.  He is here for follow-up for his bilateral knees, bilateral shoulders, left index finger, and right middle finger.  He has had multiple injections in the past in both knees and both shoulders.  He has known arthrosis in both knees.  Both shoulders have rotator cuff pathology.  Denies any new recent injury or trauma to any of the aforementioned joints.  He does complain of bilateral knee pain on a daily basis.  It is worse with any weight-bearing activities.  It does interfere with some of his work duties and his activities of daily living.    Current Medications  Current Outpatient Medications   Medication Sig Dispense Refill    ascorbic acid, vitamin C, (VITAMIN C) 1000 MG tablet Take 1,000 mg by mouth once daily.      aspirin (ECOTRIN) 81 MG EC tablet Take 81 mg by mouth once daily.      atorvastatin (LIPITOR) 40 MG tablet Take 1 tablet (40 mg total) by mouth every evening. 90  tablet 3    cholecalciferol, vitamin D3, (VITAMIN D3) 25 mcg (1,000 unit) capsule Take 1,000 Units by mouth once daily.      cyanocobalamin (VITAMIN B-12) 1000 MCG tablet Take 100 mcg by mouth once daily.      flaxseed oil 1,000 mg Cap Take by mouth.      ginkgo biloba 60 mg Cap Take by mouth.      gluc hull/chondro hull A/vit C/Mn (GLUCOSAMINE 1500 COMPLEX ORAL) Take by mouth.      losartan (COZAAR) 50 MG tablet Take 1 tablet (50 mg total) by mouth once daily. 90 tablet 3    meloxicam (MOBIC) 15 MG tablet Take 1 tablet (15 mg total) by mouth daily as needed for Pain. 90 tablet 2    multivit-min/FA/lycopen/lutein (CENTRUM SILVER MEN ORAL) Take by mouth.      omega 3-dha-epa-fish oil 1,000 mg (120 mg-180 mg) Cap Take by mouth.      omeprazole (PRILOSEC) 40 MG capsule Take 1 capsule (40 mg total) by mouth once daily. 90 capsule 3    propranoloL (INDERAL) 40 MG tablet Take 2 tablets (80 mg total) by mouth once daily. 180 tablet 1    TURMERIC ORAL Take by mouth.      vitamin E 400 UNIT capsule Take 400 Units by mouth once daily.       No current facility-administered medications for this visit.       Allergies  Review of patient's allergies indicates:  No Known Allergies    Past Medical History  History reviewed. No pertinent past medical history.    Surgical History  Past Surgical History:   Procedure Laterality Date    COLONOSCOPY  2016    Dr. [facility] 10 years    VASECTOMY  2012       Family History:   History reviewed. No pertinent family history.    Social History:   Social History     Socioeconomic History    Marital status:    Tobacco Use    Smoking status: Never    Smokeless tobacco: Never   Substance and Sexual Activity    Alcohol use: Yes     Alcohol/week: 1.0 standard drink of alcohol     Types: 1 Cans of beer per week    Drug use: Never    Sexual activity: Yes     Partners: Female     Birth control/protection: None     Social Determinants of Health     Financial Resource Strain: Unknown (6/12/2023)     Overall Financial Resource Strain (CARDIA)     Difficulty of Paying Living Expenses: Patient refused   Food Insecurity: Unknown (6/12/2023)    Hunger Vital Sign     Worried About Running Out of Food in the Last Year: Patient refused     Ran Out of Food in the Last Year: Patient refused   Transportation Needs: Unknown (6/12/2023)    PRAPARE - Transportation     Lack of Transportation (Medical): Patient refused     Lack of Transportation (Non-Medical): Patient refused   Physical Activity: Insufficiently Active (6/12/2023)    Exercise Vital Sign     Days of Exercise per Week: 2 days     Minutes of Exercise per Session: 30 min   Stress: Unknown (6/12/2023)    [employer] - Occupational Stress Questionnaire     Feeling of Stress : Patient refused   Social Connections: Unknown (6/12/2023)    Social Connection and Isolation Panel [NHANES]     Frequency of Communication with Friends and Family: Patient refused     Frequency of Social Gatherings with Friends and Family: Patient refused     Active Member of Clubs or Organizations: Patient refused     Attends Club or Organization Meetings: Patient refused     Marital Status:    Housing Stability: Unknown (6/12/2023)    Housing Stability Vital Sign     Unable to Pay for Housing in the Last Year: Patient refused     Unstable Housing in the Last Year: Patient refused       Hospitalization/Major Diagnostic Procedure:     Review of Systems     General/Constitutional:  Chills denies. Fatigue denies. Fever denies. Weight gain denies. Weight loss denies.    Respiratory:  Shortness of breath denies.    Cardiovascular:  Chest pain denies.    Gastrointestinal:  Constipation denies. Diarrhea denies. Nausea denies. Vomiting denies.     Hematology:  Easy bruising denies. Prolonged bleeding denies.     Genitourinary:  Frequent urination denies. Pain in lower back denies. Painful urination denies.     Musculoskeletal:  See HPI for details    Skin:  Rash  denies.    Neurologic:  Dizziness denies. Gait abnormalities denies. Seizures denies. Tingling/Numbess denies.    Psychiatric:  Anxiety denies. Depressed mood denies.     Objective:   Vital Signs: There were no vitals filed for this visit.     Physical Exam      General Examination:     Constitutional: The patient is alert and oriented to lace person and time. Mood is pleasant.     Head/Face: Normal facial features normal eyebrows    Eyes: Normal extraocular motion bilaterally    Lungs: Respirations are equal and unlabored    Gait is coordinated.    Cardiovascular: There are no swelling or varicosities present.    Lymphatic: Negative for adenopathy    Skin: Normal    Neurological: Level of consciousness normal. Oriented to place person and time and situation    Psychiatric: Oriented to time place person and situation    Bilateral knee exam:  Skin to bilateral knees is dry and intact.  There is no erythema or ecchymosis bilaterally.  There are no signs or symptoms of infection bilaterally.  Patient is neurovascularly intact throughout bilateral lower extremities.  Bilateral calves are soft and nontender.  Bilateral knee range of motion well-preserved at 0-120 degrees.  Both knees are stable to varus and valgus stresses.  Both knees diffusely tender along the medial aspect with palpation.  He does have crepitus of bilateral knees with range of motioning.  He can weightbear as tolerated on bilateral lower extremities.  He does have an antalgic gait.    Bilateral shoulder exam: Skin to bilateral shoulders is clean dry and intact.  No erythema or ecchymosis bilaterally.  No signs or symptoms of infection bilaterally.  Patient is neurovascular intact throughout bilateral upper extremities.  He has full active range of motion of bilateral shoulders.  Both shoulder rotator cuff tendons are grossly intact rotator cuff muscle testing but painful and mildly weak.  He does have a positive Neer impingement sign bilaterally.  He  is a positive Dickerson test bilaterally.  He has increased pain with associated weakness with resisted external and internal rotation maneuvers of bilateral shoulders.  Mild tenderness to palpation over bilateral AC joints.  Crossover test is equivocal bilaterally.      Bilateral hand exam: Skin to bilateral hands is clean dry and intact.  There is no erythema or ecchymosis bilaterally.  No signs or symptoms of infection bilaterally.  He is neurovascularly intact throughout bilateral upper extremities.  He can open and close both hands into a fist.  He can oppose her right thumb to all digits in the right hand and left thumb to all digits in the left hand.  He does have a tender palpable nodule on the palmar aspect of the right hand in line with the flexor tendon of the middle finger just proximal to the MCP joint.  He is not actively triggering on physical exam today.  On the left-hand, he does have some tenderness to palpation about the index finger MCP and PIP joints.  No laxity to varus and valgus stressing.  No warmth or erythema.  No fluctuance.  No signs or symptoms of infection.    XRAY Report/ Interpretation:  Three views were taken of bilateral hands today: AP, lateral, oblique views.  They reveal no acute fractures or dislocations bilaterally.  Visualized soft tissues appear unremarkable bilaterally.  Mild degenerative changes throughout multiple MCP and IP joints in both hands.    Assessment:       1. Trigger middle finger of right hand    2. Primary osteoarthritis of right knee    3. Primary osteoarthritis of left knee    4. Shoulder impingement syndrome, right    5. Shoulder impingement syndrome, left    6. OA (osteoarthritis) of finger, left    7. Complete tear of left rotator cuff, unspecified whether traumatic    8. Tear of right rotator cuff, unspecified tear extent, unspecified whether traumatic        Plan:       Trino was seen today for pain, pain, pain, pain, pain and pain.    Diagnoses and all  orders for this visit:    Trigger middle finger of right hand  -     X-Ray Hand 3 View Bilateral    Primary osteoarthritis of right knee  -     Large Joint Aspiration/Injection: R knee    Primary osteoarthritis of left knee  -     Large Joint Aspiration/Injection: L knee    Shoulder impingement syndrome, right    Shoulder impingement syndrome, left    OA (osteoarthritis) of finger, left    Complete tear of left rotator cuff, unspecified whether traumatic  -     MRI Shoulder Without Contrast Left; Future    Tear of right rotator cuff, unspecified tear extent, unspecified whether traumatic  -     MRI Shoulder Without Contrast Right; Future         Follow up in about 2 weeks (around 10/4/2023) for MRI Results Bilat Shoulders  & 3 months f/up Injec. F/up Bilat. knees 9.20.23, .  This is to attest that the physician's assistant Mata Kwon served in the capacity as scribe for this patient's encounter.  This is also to verify that I have reviewed the patient's history and helped formulate the treatment plan and discussed it with the physician's assistant.  I have actively participated in the evaluation and treatment plan for this patient visit.  The treatment plan and medical decision-making is outlined below  Patient has severe arthrosis in his bilateral knees, right greater than left.  At his request, I repeated injections today.  Via an anterior lateral approach I injected both knees with 40 mg of Kenalog and lidocaine respectively.  He tolerated these well.  For his bilateral shoulders, he has had multiple corticosteroid injections and tried various over-the-counter NSAIDs.  Tried rotator cuff strengthening exercises and none of these treatment modalities have proven to be efficacious.  He continues to be symptomatic and weak with pain.  I would like to proceed with an MRI of his bilateral shoulders to evaluate the rotator cuff tendon and other soft tissue structures in both shoulders.  For his bilateral hands,  no particular intervention is warranted today.  We did give 2 different corticosteroid injections and I think more steroid is not prudent.  We will see him back after the MRI of his shoulders to review the results and further discuss surgical intervention with his knees.  I did have a long discussion with him today about total knee arthroplasty.  We discussed the use of the Mauro robot system in the outpatient nature of the procedure.  We discussed the risk of DVT and infection and method used to reduce these risks.  All of his questions were answered.    Treatment options were discussed with regards to the nature of the medical condition. Conservative pain intervention and surgical options were discussed in detail. The probability of success of each separate treatment option was discussed. The patient expressed a clear understanding of the treatment options. With regards to surgery, the procedure risk, benefits, complications, and outcomes were discussed. No guarantees were given with regards to surgical outcome.   The risk of complications, morbidity, and mortality of patient management decisions have been made at the time of this visit. These are associated with the patient's problems, diagnostic procedures and treatment options. This includes the possible management options selected and those considered but not selected by the patient after shared medical decision making we discussed with the patient.     This note was created using Dragon voice recognition software that occasionally misinterpreted phrases or words.

## 2023-12-07 ENCOUNTER — TELEPHONE (OUTPATIENT)
Dept: FAMILY MEDICINE | Facility: CLINIC | Age: 58
End: 2023-12-07

## 2023-12-14 LAB
ALBUMIN SERPL-MCNC: 4 G/DL (ref 3.6–5.1)
ALBUMIN/GLOB SERPL: 1.9 (CALC) (ref 1–2.5)
ALP SERPL-CCNC: 61 U/L (ref 35–144)
ALT SERPL-CCNC: 77 U/L (ref 9–46)
AST SERPL-CCNC: 51 U/L (ref 10–35)
BILIRUB SERPL-MCNC: 0.4 MG/DL (ref 0.2–1.2)
BUN SERPL-MCNC: 23 MG/DL (ref 7–25)
BUN/CREAT SERPL: ABNORMAL (CALC) (ref 6–22)
CALCIUM SERPL-MCNC: 9.1 MG/DL (ref 8.6–10.3)
CHLORIDE SERPL-SCNC: 107 MMOL/L (ref 98–110)
CHOLEST SERPL-MCNC: 154 MG/DL
CHOLEST/HDLC SERPL: 2.8 (CALC)
CO2 SERPL-SCNC: 29 MMOL/L (ref 20–32)
CREAT SERPL-MCNC: 0.95 MG/DL (ref 0.7–1.3)
EGFR: 93 ML/MIN/1.73M2
GLOBULIN SER CALC-MCNC: 2.1 G/DL (CALC) (ref 1.9–3.7)
GLUCOSE SERPL-MCNC: 90 MG/DL (ref 65–99)
HDLC SERPL-MCNC: 56 MG/DL
LDLC SERPL CALC-MCNC: 87 MG/DL (CALC)
NONHDLC SERPL-MCNC: 98 MG/DL (CALC)
POTASSIUM SERPL-SCNC: 4.4 MMOL/L (ref 3.5–5.3)
PROT SERPL-MCNC: 6.1 G/DL (ref 6.1–8.1)
PSA SERPL-MCNC: 0.59 NG/ML
SODIUM SERPL-SCNC: 143 MMOL/L (ref 135–146)
TRIGL SERPL-MCNC: 40 MG/DL

## 2023-12-20 ENCOUNTER — TELEPHONE (OUTPATIENT)
Dept: FAMILY MEDICINE | Facility: CLINIC | Age: 58
End: 2023-12-20

## 2023-12-20 ENCOUNTER — OFFICE VISIT (OUTPATIENT)
Dept: FAMILY MEDICINE | Facility: CLINIC | Age: 58
End: 2023-12-20
Attending: FAMILY MEDICINE
Payer: COMMERCIAL

## 2023-12-20 DIAGNOSIS — K21.9 GASTROESOPHAGEAL REFLUX DISEASE WITHOUT ESOPHAGITIS: ICD-10-CM

## 2023-12-20 DIAGNOSIS — M19.011 PRIMARY OSTEOARTHRITIS OF BOTH SHOULDERS: ICD-10-CM

## 2023-12-20 DIAGNOSIS — M17.0 OSTEOARTHRITIS OF BOTH KNEES, UNSPECIFIED OSTEOARTHRITIS TYPE: ICD-10-CM

## 2023-12-20 DIAGNOSIS — N20.0 NEPHROLITHIASIS: ICD-10-CM

## 2023-12-20 DIAGNOSIS — G25.2 INTENTION TREMOR: ICD-10-CM

## 2023-12-20 DIAGNOSIS — I10 HYPERTENSION, UNSPECIFIED TYPE: ICD-10-CM

## 2023-12-20 DIAGNOSIS — E78.00 PURE HYPERCHOLESTEROLEMIA: Primary | ICD-10-CM

## 2023-12-20 DIAGNOSIS — M19.012 PRIMARY OSTEOARTHRITIS OF BOTH SHOULDERS: ICD-10-CM

## 2023-12-20 PROCEDURE — 99214 PR OFFICE/OUTPT VISIT, EST, LEVL IV, 30-39 MIN: ICD-10-PCS | Mod: S$GLB,,, | Performed by: FAMILY MEDICINE

## 2023-12-20 PROCEDURE — 3080F DIAST BP >= 90 MM HG: CPT | Mod: CPTII,S$GLB,, | Performed by: FAMILY MEDICINE

## 2023-12-20 PROCEDURE — 4010F ACE/ARB THERAPY RXD/TAKEN: CPT | Mod: CPTII,S$GLB,, | Performed by: FAMILY MEDICINE

## 2023-12-20 PROCEDURE — 3061F NEG MICROALBUMINURIA REV: CPT | Mod: CPTII,S$GLB,, | Performed by: FAMILY MEDICINE

## 2023-12-20 PROCEDURE — 3075F PR MOST RECENT SYSTOLIC BLOOD PRESS GE 130-139MM HG: ICD-10-PCS | Mod: CPTII,S$GLB,, | Performed by: FAMILY MEDICINE

## 2023-12-20 PROCEDURE — 3080F PR MOST RECENT DIASTOLIC BLOOD PRESSURE >= 90 MM HG: ICD-10-PCS | Mod: CPTII,S$GLB,, | Performed by: FAMILY MEDICINE

## 2023-12-20 PROCEDURE — 3008F PR BODY MASS INDEX (BMI) DOCUMENTED: ICD-10-PCS | Mod: CPTII,S$GLB,, | Performed by: FAMILY MEDICINE

## 2023-12-20 PROCEDURE — 3066F PR DOCUMENTATION OF TREATMENT FOR NEPHROPATHY: ICD-10-PCS | Mod: CPTII,S$GLB,, | Performed by: FAMILY MEDICINE

## 2023-12-20 PROCEDURE — 3066F NEPHROPATHY DOC TX: CPT | Mod: CPTII,S$GLB,, | Performed by: FAMILY MEDICINE

## 2023-12-20 PROCEDURE — 3075F SYST BP GE 130 - 139MM HG: CPT | Mod: CPTII,S$GLB,, | Performed by: FAMILY MEDICINE

## 2023-12-20 PROCEDURE — 99214 OFFICE O/P EST MOD 30 MIN: CPT | Mod: S$GLB,,, | Performed by: FAMILY MEDICINE

## 2023-12-20 PROCEDURE — 3061F PR NEG MICROALBUMINURIA RESULT DOCUMENTED/REVIEW: ICD-10-PCS | Mod: CPTII,S$GLB,, | Performed by: FAMILY MEDICINE

## 2023-12-20 PROCEDURE — 1159F PR MEDICATION LIST DOCUMENTED IN MEDICAL RECORD: ICD-10-PCS | Mod: CPTII,S$GLB,, | Performed by: FAMILY MEDICINE

## 2023-12-20 PROCEDURE — 1159F MED LIST DOCD IN RCRD: CPT | Mod: CPTII,S$GLB,, | Performed by: FAMILY MEDICINE

## 2023-12-20 PROCEDURE — 4010F PR ACE/ARB THEARPY RXD/TAKEN: ICD-10-PCS | Mod: CPTII,S$GLB,, | Performed by: FAMILY MEDICINE

## 2023-12-20 PROCEDURE — 3008F BODY MASS INDEX DOCD: CPT | Mod: CPTII,S$GLB,, | Performed by: FAMILY MEDICINE

## 2023-12-20 RX ORDER — PROPRANOLOL HYDROCHLORIDE 40 MG/1
80 TABLET ORAL DAILY
Qty: 180 TABLET | Refills: 3 | Status: SHIPPED | OUTPATIENT
Start: 2023-12-20 | End: 2024-06-17

## 2023-12-20 RX ORDER — ATORVASTATIN CALCIUM 20 MG/1
20 TABLET, FILM COATED ORAL DAILY
Qty: 90 TABLET | Refills: 3 | Status: SHIPPED | OUTPATIENT
Start: 2023-12-20 | End: 2024-12-19

## 2023-12-20 NOTE — TELEPHONE ENCOUNTER
----- Message from Nelda Norman sent at 12/20/2023  9:00 AM CST -----  Pt needs a 6 month f/a and he needs a Wednesday. The next available Wednesday was 07/03 and he said that wouldn't work because he has to work.  [phone]

## 2023-12-23 VITALS
DIASTOLIC BLOOD PRESSURE: 90 MMHG | BODY MASS INDEX: 36.22 KG/M2 | HEIGHT: 68 IN | WEIGHT: 239 LBS | SYSTOLIC BLOOD PRESSURE: 138 MMHG | HEART RATE: 60 BPM

## 2023-12-23 PROBLEM — M19.011 PRIMARY OSTEOARTHRITIS OF BOTH SHOULDERS: Status: ACTIVE | Noted: 2023-12-23

## 2023-12-23 PROBLEM — M19.012 PRIMARY OSTEOARTHRITIS OF BOTH SHOULDERS: Status: ACTIVE | Noted: 2023-12-23

## 2023-12-23 NOTE — PROGRESS NOTES
SUBJECTIVE:    Patient ID: Trino Head is a 58 y.o. male.    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  His in and out of a truck all day long.  He is having significant pain in his knees from osteoarthritis.  Has been seen Dr. Martin Vo.  Cortisone injections to the knees every 3-4 months.  Next meloxicam and Tylenol for pain relief.  He is over 10,000 steps per day.  His shoulders ache at night due to bone spurs.  He can not sleep on his shoulders at night.  Both knees are painful, left knee effusion greater than right knee effusion.    Hyperlipidemia, on atorvastatin 40 mg daily.  Occasionally takes flaxseed oil or fish oil.    GERD-on omeprazole daily    Intention tremors improved slightly with 80 mg propranolol HS    Follow-up  Associated symptoms include arthralgias (bilateral knee pain and bilateral shoulder pain.). Pertinent negatives include no chest pain, headaches, joint swelling, neck pain, vomiting or weakness.       No visits with results within 6 Month(s) from this visit.   Latest known visit with results is:   Office Visit on 06/14/2023   Component Date Value Ref Range Status    Glucose 12/13/2023 90  65 - 99 mg/dL Final    BUN 12/13/2023 23  7 - 25 mg/dL Final    Creatinine 12/13/2023 0.95  0.70 - 1.30 mg/dL Final    eGFR 12/13/2023 93  > OR = 60 mL/min/1.73m2 Final    BUN/Creatinine Ratio 12/13/2023 SEE NOTE:  6 - 22 (calc) Final    Sodium 12/13/2023 143  135 - 146 mmol/L Final    Potassium 12/13/2023 4.4  3.5 - 5.3 mmol/L Final    Chloride 12/13/2023 107  98 - 110 mmol/L Final    CO2 12/13/2023 29  20 - 32 mmol/L Final    Calcium 12/13/2023 9.1  8.6 - 10.3 mg/dL Final    Total Protein 12/13/2023 6.1  6.1 - 8.1 g/dL Final    Albumin 12/13/2023 4.0  3.6 - 5.1 g/dL Final    Globulin, Total 12/13/2023 2.1  1.9 - 3.7 g/dL (calc) Final    Albumin/Globulin Ratio 12/13/2023 1.9  1.0 - 2.5 (calc) Final    Total  Bilirubin 12/13/2023 0.4  0.2 - 1.2 mg/dL Final    Alkaline Phosphatase 12/13/2023 61  35 - 144 U/L Final    AST 12/13/2023 51 (H)  10 - 35 U/L Final    ALT 12/13/2023 77 (H)  9 - 46 U/L Final    Cholesterol 12/13/2023 154  <200 mg/dL Final    HDL 12/13/2023 56  > OR = 40 mg/dL Final    Triglycerides 12/13/2023 40  <150 mg/dL Final    LDL Cholesterol 12/13/2023 87  mg/dL (calc) Final    HDL/Cholesterol Ratio 12/13/2023 2.8  <5.0 (calc) Final    Non HDL Chol. (LDL+VLDL) 12/13/2023 98  <130 mg/dL (calc) Final    PROSTATE SPECIFIC ANTIGEN, SCR - Q* 12/13/2023 0.59  < OR = 4.00 ng/mL Final       No past medical history on file.  Social History     Socioeconomic History    Marital status:    Tobacco Use    Smoking status: Never    Smokeless tobacco: Never   Substance and Sexual Activity    Alcohol use: Yes     Alcohol/week: 1.0 standard drink of alcohol     Types: 1 Cans of beer per week    Drug use: Never    Sexual activity: Yes     Partners: Female     Birth control/protection: None     Social Determinants of Health     Financial Resource Strain: Patient Declined (12/14/2023)    Overall Financial Resource Strain (CARDIA)     Difficulty of Paying Living Expenses: Patient declined   Food Insecurity: Patient Declined (12/14/2023)    Hunger Vital Sign     Worried About Running Out of Food in the Last Year: Patient declined     Ran Out of Food in the Last Year: Patient declined   Transportation Needs: Patient Declined (12/14/2023)    PRAPARE - Transportation     Lack of Transportation (Medical): Patient declined     Lack of Transportation (Non-Medical): Patient declined   Physical Activity: Unknown (12/14/2023)    Exercise Vital Sign     Days of Exercise per Week: Patient declined     Minutes of Exercise per Session: 30 min   Stress: Patient Declined (12/14/2023)    [employer] - Occupational Stress Questionnaire     Feeling of Stress : Patient declined   Social Connections: Unknown  (12/14/2023)    Social Connection and Isolation Panel [NHANES]     Frequency of Communication with Friends and Family: Patient declined     Frequency of Social Gatherings with Friends and Family: Patient declined     Active Member of Clubs or Organizations: Patient declined     Attends Club or Organization Meetings: Patient declined     Marital Status:    Housing Stability: Unknown (12/14/2023)    Housing Stability Vital Sign     Unable to Pay for Housing in the Last Year: Patient refused     Unstable Housing in the Last Year: Patient refused     Past Surgical History:   Procedure Laterality Date    COLONOSCOPY  2016    Dr. Chauhan-RTC 10 years    VASECTOMY  2012     No family history on file.    The 10-year CVD risk score (Monalisa et al., 2008) is: 15.6%    Values used to calculate the score:      Age: 58 years      Sex: Male      Diabetic: No      Tobacco smoker: No      Systolic Blood Pressure: 150 mmHg      Is BP treated: Yes      HDL Cholesterol: 56 mg/dL      Total Cholesterol: 154 mg/dL    Tests to Keep You Healthy    Colon Cancer Screening: Met on 6/8/2016  Last Blood Pressure <= 139/89 (12/20/2023): NO      Review of patient's allergies indicates:  No Known Allergies    Current Outpatient Medications:     ascorbic acid, vitamin C, (VITAMIN C) 1000 MG tablet, Take 1,000 mg by mouth once daily., Disp: , Rfl:     aspirin (ECOTRIN) 81 MG EC tablet, Take 81 mg by mouth once daily., Disp: , Rfl:     cholecalciferol, vitamin D3, (VITAMIN D3) 25 mcg (1,000 unit) capsule, Take 1,000 Units by mouth once daily., Disp: , Rfl:     cyanocobalamin (VITAMIN B-12) 1000 MCG tablet, Take 100 mcg by mouth once daily., Disp: , Rfl:     flaxseed oil 1,000 mg Cap, Take by mouth., Disp: , Rfl:     ginkgo biloba 60 mg Cap, Take by mouth., Disp: , Rfl:     gluc hull/chondro hull A/vit C/Mn (GLUCOSAMINE 1500 COMPLEX ORAL), Take by mouth., Disp: , Rfl:     losartan (COZAAR) 50 MG tablet, Take 1 tablet (50 mg total) by mouth  "once daily., Disp: 90 tablet, Rfl: 3    meloxicam (MOBIC) 15 MG tablet, Take 1 tablet (15 mg total) by mouth daily as needed for Pain., Disp: 90 tablet, Rfl: 2    multivit-min/FA/lycopen/lutein (CENTRUM SILVER MEN ORAL), Take by mouth., Disp: , Rfl:     omega 3-dha-epa-fish oil 1,000 mg (120 mg-180 mg) Cap, Take by mouth., Disp: , Rfl:     omeprazole (PRILOSEC) 40 MG capsule, Take 1 capsule (40 mg total) by mouth once daily., Disp: 90 capsule, Rfl: 3    TURMERIC ORAL, Take by mouth., Disp: , Rfl:     vitamin E 400 UNIT capsule, Take 400 Units by mouth once daily., Disp: , Rfl:     atorvastatin (LIPITOR) 20 MG tablet, Take 1 tablet (20 mg total) by mouth once daily., Disp: 90 tablet, Rfl: 3    propranoloL (INDERAL) 40 MG tablet, Take 2 tablets (80 mg total) by mouth once daily., Disp: 180 tablet, Rfl: 3    Review of Systems   Constitutional:  Negative for activity change and unexpected weight change.   HENT:  Negative for hearing loss, rhinorrhea and trouble swallowing.    Eyes:  Negative for discharge and visual disturbance.   Respiratory:  Negative for chest tightness and wheezing.    Cardiovascular:  Negative for chest pain and palpitations.   Gastrointestinal:  Positive for reflux. Negative for blood in stool, constipation, diarrhea and vomiting.   Endocrine: Negative for polydipsia and polyuria.   Genitourinary:  Negative for difficulty urinating, hematuria and urgency.   Musculoskeletal:  Positive for arthralgias (bilateral knee pain and bilateral shoulder pain.). Negative for joint swelling and neck pain.   Neurological:  Negative for weakness and headaches.   Psychiatric/Behavioral:  Negative for confusion and dysphoric mood.            Objective:      Vitals:    12/20/23 0830 12/20/23 0831   BP: (!) 160/98 (!) 150/94   Pulse: 60    Weight: 108.4 kg (239 lb)    Height: 5' 8" (1.727 m)      Physical Exam  Vitals and nursing note reviewed.   Constitutional:       General: He is not in acute distress.     " Appearance: He is well-developed. He is obese. He is not toxic-appearing.   HENT:      Head: Normocephalic and atraumatic.      Right Ear: Tympanic membrane and external ear normal.      Left Ear: Tympanic membrane and external ear normal.      Nose: Nose normal.      Mouth/Throat:      Pharynx: Oropharynx is clear.   Eyes:      Pupils: Pupils are equal, round, and reactive to light.   Neck:      Thyroid: No thyromegaly.      Vascular: No carotid bruit.   Cardiovascular:      Rate and Rhythm: Normal rate and regular rhythm.      Heart sounds: Normal heart sounds. No murmur heard.  Pulmonary:      Effort: Pulmonary effort is normal.      Breath sounds: Normal breath sounds. No wheezing or rales.   Abdominal:      General: Bowel sounds are normal. There is no distension.      Palpations: Abdomen is soft.      Tenderness: There is no abdominal tenderness.   Musculoskeletal:         General: No tenderness or deformity. Normal range of motion.      Cervical back: Normal range of motion and neck supple.      Lumbar back: Normal. No spasms.      Comments: Bends 90 degrees at  waist, shoulders have good range of motion, knees are crepitant bilaterally.  No obvious effusions today.  No pitting edema to lower extremities   Lymphadenopathy:      Cervical: No cervical adenopathy.   Skin:     General: Skin is warm and dry.      Findings: No rash.   Neurological:      Mental Status: He is alert and oriented to person, place, and time.      Cranial Nerves: No cranial nerve deficit.      Coordination: Coordination normal.   Psychiatric:         Behavior: Behavior normal.         Thought Content: Thought content normal.         Judgment: Judgment normal.           Assessment:       1. Pure hypercholesterolemia    2. Intention tremor    3. Hypertension, unspecified type    4. Nephrolithiasis    5. Gastroesophageal reflux disease without esophagitis    6. Osteoarthritis of both knees, unspecified osteoarthritis type    7. Primary  osteoarthritis of both shoulders         Plan:       Pure hypercholesterolemia  -     atorvastatin (LIPITOR) 20 MG tablet; Take 1 tablet (20 mg total) by mouth once daily.  Dispense: 90 tablet; Refill: 3  Cholesterol has improved from 258 down to 154, HDL 56 TG 40 LDL 87, mild elevation of AST 51 ALT 77, will reduce atorvastatin down to 20 mg daily and check labs in 6 months  Intention tremor  -     propranoloL (INDERAL) 40 MG tablet; Take 2 tablets (80 mg total) by mouth once daily.  Dispense: 180 tablet; Refill: 3  Mild improvement with propranolol  Hypertension, unspecified type  Blood pressure 138/90  Nephrolithiasis  Asymptomatic  Gastroesophageal reflux disease without esophagitis    Osteoarthritis of both knees, unspecified osteoarthritis type  Continue meloxicam and Tylenol.  Weight loss of 10 lb recommended  Primary osteoarthritis of both shoulders      Follow up in about 6 months (around 6/20/2024).        12/23/2023 Titus Graham

## 2024-01-10 ENCOUNTER — OFFICE VISIT (OUTPATIENT)
Dept: ORTHOPEDICS | Facility: CLINIC | Age: 59
End: 2024-01-10
Payer: COMMERCIAL

## 2024-01-10 VITALS — BODY MASS INDEX: 36.22 KG/M2 | WEIGHT: 239 LBS | HEIGHT: 68 IN

## 2024-01-10 DIAGNOSIS — M17.11 PRIMARY OSTEOARTHRITIS OF RIGHT KNEE: Primary | ICD-10-CM

## 2024-01-10 DIAGNOSIS — M17.12 PRIMARY OSTEOARTHRITIS OF LEFT KNEE: ICD-10-CM

## 2024-01-10 DIAGNOSIS — M23.204 DEGENERATIVE TEAR OF LEFT MEDIAL MENISCUS: ICD-10-CM

## 2024-01-10 PROCEDURE — 1160F RVW MEDS BY RX/DR IN RCRD: CPT | Mod: CPTII,S$GLB,, | Performed by: ORTHOPAEDIC SURGERY

## 2024-01-10 PROCEDURE — 1159F MED LIST DOCD IN RCRD: CPT | Mod: CPTII,S$GLB,, | Performed by: ORTHOPAEDIC SURGERY

## 2024-01-10 PROCEDURE — 3008F BODY MASS INDEX DOCD: CPT | Mod: CPTII,S$GLB,, | Performed by: ORTHOPAEDIC SURGERY

## 2024-01-10 PROCEDURE — 99213 OFFICE O/P EST LOW 20 MIN: CPT | Mod: S$GLB,,, | Performed by: ORTHOPAEDIC SURGERY

## 2024-01-10 NOTE — PROGRESS NOTES
Subjective:       Patient ID: Trino Head is a 58 y.o. male.    Chief Complaint: Pain of the Left Knee (Left Knee pain, last injected in September & after about 6 weeks it no longer helps) and Pain of the Right Knee (Right Knee Pain, last injected in September & after about 6 weeks it no longer works)      History of Present Illness  Mr. France comes in today for follow-up bilateral knees.  We have been treating his knees quite some time.  He has known severe arthrosis in the right knee with bone-on-bone deformity in the medial compartment.  He has moderate changes on plain film radiographs in the knee.  He complains of left greater than right knee pain.  Localizes his bilateral knee pain to medial compartment in both knees.  His last injections were almost 4 months ago.  Unfortunately, he only received about a month of relief.  He is interested in proceeding with some type of surgical intervention.  He prefers to start with the left knee as it is most symptomatic.    Current Medications  Current Outpatient Medications   Medication Sig Dispense Refill    ascorbic acid, vitamin C, (VITAMIN C) 1000 MG tablet Take 1,000 mg by mouth once daily.      aspirin (ECOTRIN) 81 MG EC tablet Take 81 mg by mouth once daily.      atorvastatin (LIPITOR) 20 MG tablet Take 1 tablet (20 mg total) by mouth once daily. 90 tablet 3    cholecalciferol, vitamin D3, (VITAMIN D3) 25 mcg (1,000 unit) capsule Take 1,000 Units by mouth once daily.      cyanocobalamin (VITAMIN B-12) 1000 MCG tablet Take 100 mcg by mouth once daily.      flaxseed oil 1,000 mg Cap Take by mouth.      ginkgo biloba 60 mg Cap Take by mouth.      gluc hull/chondro hull A/vit C/Mn (GLUCOSAMINE 1500 COMPLEX ORAL) Take by mouth.      losartan (COZAAR) 50 MG tablet Take 1 tablet (50 mg total) by mouth once daily. 90 tablet 3    meloxicam (MOBIC) 15 MG tablet Take 1 tablet (15 mg total) by mouth daily as needed for Pain. 90 tablet 2    multivit-min/FA/lycopen/lutein  (CENTRUM SILVER MEN ORAL) Take by mouth.      omega 3-dha-epa-fish oil 1,000 mg (120 mg-180 mg) Cap Take by mouth.      omeprazole (PRILOSEC) 40 MG capsule Take 1 capsule (40 mg total) by mouth once daily. 90 capsule 3    propranoloL (INDERAL) 40 MG tablet Take 2 tablets (80 mg total) by mouth once daily. 180 tablet 3    TURMERIC ORAL Take by mouth.      vitamin E 400 UNIT capsule Take 400 Units by mouth once daily.       No current facility-administered medications for this visit.       Allergies  Review of patient's allergies indicates:  No Known Allergies    Past Medical History  History reviewed. No pertinent past medical history.    Surgical History  Past Surgical History:   Procedure Laterality Date    COLONOSCOPY  2016    Dr. Chauhan-RTC 10 years    VASECTOMY  2012       Family History:   History reviewed. No pertinent family history.    Social History:   Social History     Socioeconomic History    Marital status:    Tobacco Use    Smoking status: Never    Smokeless tobacco: Never   Substance and Sexual Activity    Alcohol use: Yes     Alcohol/week: 1.0 standard drink of alcohol     Types: 1 Cans of beer per week    Drug use: Never    Sexual activity: Yes     Partners: Female     Birth control/protection: None     Social Determinants of Health     Financial Resource Strain: Patient Declined (12/14/2023)    Overall Financial Resource Strain (CARDIA)     Difficulty of Paying Living Expenses: Patient declined   Food Insecurity: Patient Declined (12/14/2023)    Hunger Vital Sign     Worried About Running Out of Food in the Last Year: Patient declined     Ran Out of Food in the Last Year: Patient declined   Transportation Needs: Patient Declined (12/14/2023)    PRAPARE - Transportation     Lack of Transportation (Medical): Patient declined     Lack of Transportation (Non-Medical): Patient declined   Physical Activity: Unknown (12/14/2023)    Exercise Vital Sign     Days of Exercise per Week: Patient  declined     Minutes of Exercise per Session: 30 min   Stress: Patient Declined (12/14/2023)    [employer] - Occupational Stress Questionnaire     Feeling of Stress : Patient declined   Social Connections: Unknown (12/14/2023)    Social Connection and Isolation Panel [NHANES]     Frequency of Communication with Friends and Family: Patient declined     Frequency of Social Gatherings with Friends and Family: Patient declined     Active Member of Clubs or Organizations: Patient declined     Attends Club or Organization Meetings: Patient declined     Marital Status:    Housing Stability: Unknown (12/14/2023)    Housing Stability Vital Sign     Unable to Pay for Housing in the Last Year: Patient refused     Unstable Housing in the Last Year: Patient refused       Hospitalization/Major Diagnostic Procedure:     Review of Systems     General/Constitutional:  Chills denies. Fatigue denies. Fever denies. Weight gain denies. Weight loss denies.    Respiratory:  Shortness of breath denies.    Cardiovascular:  Chest pain denies.    Gastrointestinal:  Constipation denies. Diarrhea denies. Nausea denies. Vomiting denies.     Hematology:  Easy bruising denies. Prolonged bleeding denies.     Genitourinary:  Frequent urination denies. Pain in lower back denies. Painful urination denies.     Musculoskeletal:  See HPI for details    Skin:  Rash denies.    Neurologic:  Dizziness denies. Gait abnormalities denies. Seizures denies. Tingling/Numbess denies.    Psychiatric:  Anxiety denies. Depressed mood denies.     Objective:   Vital Signs: There were no vitals filed for this visit.     Physical Exam      General Examination:     Constitutional: The patient is alert and oriented to lace person and time. Mood is pleasant.     Head/Face: Normal facial features normal eyebrows    Eyes: Normal extraocular motion bilaterally    Lungs: Respirations are equal and unlabored    Gait is  coordinated.    Cardiovascular: There are no swelling or varicosities present.    Lymphatic: Negative for adenopathy    Skin: Normal    Neurological: Level of consciousness normal. Oriented to place person and time and situation    Psychiatric: Oriented to time place person and situation    Bilateral knee exam: Skin to bilateral knees is clean dry and intact.  There is no erythema or ecchymosis bilaterally.  No signs or symptoms of infection bilaterally.  He is neurovascularly intact throughout bilateral lower extremities.  He can weightbear as tolerated on bilateral lower extremities.  Bilateral calves are soft and nontender.  Bilateral knee range of motion 0-120 degrees.  Both knees stable to varus and valgus stresses.  He is diffusely tender along the medial aspect of his bilateral knees.  He does have an antalgic gait.  For the left knee is tender directly over the medial joint line.  He does have a positive Tom's for reproduction of pain, but I do not appreciate a palpable pop or click.    Assessment:       1. Primary osteoarthritis of right knee    2. Primary osteoarthritis of left knee        Plan:       Trino was seen today for pain and pain.    Diagnoses and all orders for this visit:    Primary osteoarthritis of right knee    Primary osteoarthritis of left knee         No follow-ups on file.  This is to attest that the physician's assistant Mata Kwon served in the capacity as scribe for this patient's encounter.  This is also to verify that I have reviewed the patient's history and helped formulate the treatment plan and discussed it with the physician's assistant.  I have actively participated in the evaluation and treatment plan for this patient visit.  The treatment plan and medical decision-making is outlined below  Reviewed his radiographs in September 2023.  Patient does have severe arthrosis in the medial compartment of the right knee with bone-on-bone deformity and early osteophyte  development.  For the left knee, he has moderate changes medially and mild changes laterally.  I do not believe the majority of his symptoms in the left knee are strictly arthritic in nature.  He absolutely has symptoms consistent with possible meniscal pathology.  I would like to proceed with an MRI of the left knee to evaluate for any internal derangement, specifically medial meniscus tear.  May be more amenable to a left knee arthroscopy to address his left knee pain versus a total knee arthroplasty.  He is aware that he will need a total knee arthroplasty in the right knee at some point.  We will see him back after the MRI of the left knee to review those results and assess his menisci as well as his cartilaginous surfaces.  We will make a surgical determination based off of those results.    This note was created using Dragon voice recognition software that occasionally misinterpreted phrases or words.

## 2024-01-17 ENCOUNTER — PATIENT MESSAGE (OUTPATIENT)
Dept: ADMINISTRATIVE | Facility: HOSPITAL | Age: 59
End: 2024-01-17
Payer: COMMERCIAL

## 2024-01-24 ENCOUNTER — HOSPITAL ENCOUNTER (OUTPATIENT)
Dept: RADIOLOGY | Facility: HOSPITAL | Age: 59
Discharge: HOME | End: 2024-01-24
Attending: ORTHOPAEDIC SURGERY
Payer: COMMERCIAL

## 2024-01-24 DIAGNOSIS — M23.204 DEGENERATIVE TEAR OF LEFT MEDIAL MENISCUS: ICD-10-CM

## 2024-01-24 DIAGNOSIS — M17.12 PRIMARY OSTEOARTHRITIS OF LEFT KNEE: ICD-10-CM

## 2024-01-24 PROCEDURE — 73721 MRI JNT OF LWR EXTRE W/O DYE: CPT | Mod: TC,PO,LT

## 2024-02-07 ENCOUNTER — PATIENT MESSAGE (OUTPATIENT)
Dept: ORTHOPEDICS | Facility: CLINIC | Age: 59
End: 2024-02-07
Payer: COMMERCIAL

## 2024-02-07 ENCOUNTER — OFFICE VISIT (OUTPATIENT)
Dept: ORTHOPEDICS | Facility: CLINIC | Age: 59
End: 2024-02-07
Payer: COMMERCIAL

## 2024-02-07 VITALS — HEIGHT: 68 IN | BODY MASS INDEX: 36.22 KG/M2 | WEIGHT: 239 LBS

## 2024-02-07 DIAGNOSIS — M17.12 PRIMARY OSTEOARTHRITIS OF LEFT KNEE: Primary | ICD-10-CM

## 2024-02-07 DIAGNOSIS — M23.204 DEGENERATIVE TEAR OF LEFT MEDIAL MENISCUS: ICD-10-CM

## 2024-02-07 PROCEDURE — 1160F RVW MEDS BY RX/DR IN RCRD: CPT | Mod: CPTII,S$GLB,, | Performed by: ORTHOPAEDIC SURGERY

## 2024-02-07 PROCEDURE — 3008F BODY MASS INDEX DOCD: CPT | Mod: CPTII,S$GLB,, | Performed by: ORTHOPAEDIC SURGERY

## 2024-02-07 PROCEDURE — 99213 OFFICE O/P EST LOW 20 MIN: CPT | Mod: S$GLB,,, | Performed by: ORTHOPAEDIC SURGERY

## 2024-02-07 PROCEDURE — 1159F MED LIST DOCD IN RCRD: CPT | Mod: CPTII,S$GLB,, | Performed by: ORTHOPAEDIC SURGERY

## 2024-02-07 RX ORDER — TRANEXAMIC ACID 10 MG/ML
1000 INJECTION, SOLUTION INTRAVENOUS
Status: CANCELLED | OUTPATIENT
Start: 2024-02-07

## 2024-02-07 RX ORDER — CEFAZOLIN SODIUM 2 G/50ML
2 SOLUTION INTRAVENOUS
Status: CANCELLED | OUTPATIENT
Start: 2024-02-07

## 2024-02-07 NOTE — PROGRESS NOTES
Subjective:       Patient ID: Trino Head is a 59 y.o. male.    Chief Complaint: Pain of the Left Knee (Patient is here for MRI results of the left knee, states his pain is the same as the last visit)      History of Present Illness    Prior to meeting with the patient I reviewed the medical chart in [facility]. This included reviewing the previous progress notes from our office, review of the patient's last appointment with their primary care provider, review of any visits to the emergency room, and review of any pain management appointments or procedures.   Patient is here follow-up for bilateral knee pain but predominantly left and review an updated left knee MRI.    Current Medications  Current Outpatient Medications   Medication Sig Dispense Refill    ascorbic acid, vitamin C, (VITAMIN C) 1000 MG tablet Take 1,000 mg by mouth once daily.      aspirin (ECOTRIN) 81 MG EC tablet Take 81 mg by mouth once daily.      atorvastatin (LIPITOR) 20 MG tablet Take 1 tablet (20 mg total) by mouth once daily. 90 tablet 3    cholecalciferol, vitamin D3, (VITAMIN D3) 25 mcg (1,000 unit) capsule Take 1,000 Units by mouth once daily.      cyanocobalamin (VITAMIN B-12) 1000 MCG tablet Take 100 mcg by mouth once daily.      flaxseed oil 1,000 mg Cap Take by mouth.      ginkgo biloba 60 mg Cap Take by mouth.      gluc hull/chondro hull A/vit C/Mn (GLUCOSAMINE 1500 COMPLEX ORAL) Take by mouth.      losartan (COZAAR) 50 MG tablet Take 1 tablet (50 mg total) by mouth once daily. 90 tablet 3    meloxicam (MOBIC) 15 MG tablet Take 1 tablet (15 mg total) by mouth daily as needed for Pain. 90 tablet 2    multivit-min/FA/lycopen/lutein (CENTRUM SILVER MEN ORAL) Take by mouth.      omega 3-dha-epa-fish oil 1,000 mg (120 mg-180 mg) Cap Take by mouth.      omeprazole (PRILOSEC) 40 MG capsule Take 1 capsule (40 mg total) by mouth once daily. 90 capsule 3    propranoloL (INDERAL) 40 MG tablet Take 2 tablets (80 mg total) by mouth once daily. 180  tablet 3    TURMERIC ORAL Take by mouth.      vitamin E 400 UNIT capsule Take 400 Units by mouth once daily.       No current facility-administered medications for this visit.       Allergies  Review of patient's allergies indicates:  No Known Allergies    Past Medical History  History reviewed. No pertinent past medical history.    Surgical History  Past Surgical History:   Procedure Laterality Date    COLONOSCOPY  2016    Dr. Chauhan-RTC 10 years    VASECTOMY  2012       Family History:   History reviewed. No pertinent family history.    Social History:   Social History     Socioeconomic History    Marital status:    Tobacco Use    Smoking status: Never    Smokeless tobacco: Never   Substance and Sexual Activity    Alcohol use: Yes     Alcohol/week: 1.0 standard drink of alcohol     Types: 1 Cans of beer per week    Drug use: Never    Sexual activity: Yes     Partners: Female     Birth control/protection: None     Social Determinants of Health     Financial Resource Strain: Patient Declined (12/14/2023)    Overall Financial Resource Strain (CARDIA)     Difficulty of Paying Living Expenses: Patient declined   Food Insecurity: Patient Declined (12/14/2023)    Hunger Vital Sign     Worried About Running Out of Food in the Last Year: Patient declined     Ran Out of Food in the Last Year: Patient declined   Transportation Needs: Patient Declined (12/14/2023)    PRAPARE - Transportation     Lack of Transportation (Medical): Patient declined     Lack of Transportation (Non-Medical): Patient declined   Physical Activity: Unknown (12/14/2023)    Exercise Vital Sign     Days of Exercise per Week: Patient declined     Minutes of Exercise per Session: 30 min   Stress: Patient Declined (12/14/2023)    [employer] - Occupational Stress Questionnaire     Feeling of Stress : Patient declined   Social Connections: Unknown (12/14/2023)    Social Connection and Isolation Panel [NHANES]      Frequency of Communication with Friends and Family: Patient declined     Frequency of Social Gatherings with Friends and Family: Patient declined     Active Member of Clubs or Organizations: Patient declined     Attends Club or Organization Meetings: Patient declined     Marital Status:    Housing Stability: Unknown (12/14/2023)    Housing Stability Vital Sign     Unable to Pay for Housing in the Last Year: Patient refused     Unstable Housing in the Last Year: Patient refused       Hospitalization/Major Diagnostic Procedure:     Review of Systems     General/Constitutional:  Chills denies. Fatigue denies. Fever denies. Weight gain denies. Weight loss denies.    Respiratory:  Shortness of breath denies.    Cardiovascular:  Chest pain denies.    Gastrointestinal:  Constipation denies. Diarrhea denies. Nausea denies. Vomiting denies.     Hematology:  Easy bruising denies. Prolonged bleeding denies.     Genitourinary:  Frequent urination denies. Pain in lower back denies. Painful urination denies.     Musculoskeletal:  See HPI for details    Skin:  Rash denies.    Neurologic:  Dizziness denies. Gait abnormalities denies. Seizures denies. Tingling/Numbess denies.    Psychiatric:  Anxiety denies. Depressed mood denies.     Objective:   Vital Signs: There were no vitals filed for this visit.     Physical Exam      General Examination:     Constitutional: The patient is alert and oriented to lace person and time. Mood is pleasant.     Head/Face: Normal facial features normal eyebrows    Eyes: Normal extraocular motion bilaterally    Lungs: Respirations are equal and unlabored    Gait is coordinated.    Cardiovascular: There are no swelling or varicosities present.    Lymphatic: Negative for adenopathy    Skin: Normal    Neurological: Level of consciousness normal. Oriented to place person and time and situation    Psychiatric: Oriented to time place person and situation    Physical exam is unchanged  XRAY Report/  Interpretation:  Left knee MRI demonstrates extensive complex tear of the posterior horn and body of the medial meniscus.  However it also demonstrates full-thickness cartilage loss throughout the entire medial compartment with reactive bone edema of the medial femoral condyle and the medial tibial plateau.  There is edema of the MCL as well.  There is also full-thickness cartilage defect in the left femoral trochlea underneath the patella.  The lateral compartment is relatively normal.      Assessment:       1. Primary osteoarthritis of left knee    2. Degenerative tear of left medial meniscus        Plan:       Trino was seen today for pain.    Diagnoses and all orders for this visit:    Primary osteoarthritis of left knee    Degenerative tear of left medial meniscus         No follow-ups on file.  This is to attest that the physician's assistant, Titus Perez served in the capacity as a scribe for this patient's encounter.  This is also verify that I have reviewed the patient's history and help formulate the treatment plan as discussed with the physician's assistant.  I have actively participated in the evaluation and treatment plan for this patient visit.  The treatment plan and medical decision-making is outlined below    After long discussion the patient has decided that he would like to pursue left total knee arthroplasty using the Mauro robot.  The technical aspects of the surgery were discussed in detail with the patient today. The patient was able to verbalize an understanding. The procedure risk benefits alternatives and possible complications of the surgical procedure was discussed including expected outcomes. No guarantees were given regards to outcomes. Consent forms were will be signed at a later date. All questions regarding the surgery itself were answered. The patient wishes to proceed with surgery and will be scheduled. The patient may require preoperative medical clearance.    We will get him  scheduled as soon as possible.  We will plan to do a right total knee arthroplasty using the Mauro robot within a few months after the left.    The mobility limitation cannot be sufficiently resolved by the use of a cane. Patient's functional mobility deficit can be sufficiently resolved with the use of a (Rolling Walker or Walker). Patient's mobility limitation significantly impairs their ability to participate in one of more activities of daily living. The use of a (RW or Walker) will significantly improve the patient's ability to participate in MRADLS and the patient will use it on regular basis in the home.    Treatment options were discussed with regards to the nature of the medical condition. Conservative pain intervention and surgical options were discussed in detail. The probability of success of each separate treatment option was discussed. The patient expressed a clear understanding of the treatment options. With regards to surgery, the procedure risk, benefits, complications, and outcomes were discussed. No guarantees were given with regards to surgical outcome.   The risk of complications, morbidity, and mortality of patient management decisions have been made at the time of this visit. These are associated with the patient's problems, diagnostic procedures and treatment options. This includes the possible management options selected and those considered but not selected by the patient after shared medical decision making we discussed with the patient.     This note was created using Dragon voice recognition software that occasionally misinterpreted phrases or words.

## 2024-02-16 ENCOUNTER — PATIENT MESSAGE (OUTPATIENT)
Dept: ORTHOPEDICS | Facility: CLINIC | Age: 59
End: 2024-02-16

## 2024-03-11 ENCOUNTER — PATIENT MESSAGE (OUTPATIENT)
Dept: ORTHOPEDICS | Facility: CLINIC | Age: 59
End: 2024-03-11

## 2024-03-20 ENCOUNTER — TELEPHONE (OUTPATIENT)
Dept: ORTHOPEDICS | Facility: CLINIC | Age: 59
End: 2024-03-20

## 2024-03-20 ENCOUNTER — HOSPITAL ENCOUNTER (OUTPATIENT)
Dept: PREADMISSION TESTING | Facility: HOSPITAL | Age: 59
Discharge: HOME | End: 2024-03-20
Attending: PHYSICIAN ASSISTANT
Payer: COMMERCIAL

## 2024-03-20 ENCOUNTER — HOSPITAL ENCOUNTER (OUTPATIENT)
Dept: RADIOLOGY | Facility: HOSPITAL | Age: 59
Discharge: HOME | End: 2024-03-20
Attending: PHYSICIAN ASSISTANT
Payer: COMMERCIAL

## 2024-03-20 DIAGNOSIS — M17.12 PRIMARY OSTEOARTHRITIS OF LEFT KNEE: Primary | ICD-10-CM

## 2024-03-20 DIAGNOSIS — M17.12 PRIMARY OSTEOARTHRITIS OF LEFT KNEE: ICD-10-CM

## 2024-03-20 DIAGNOSIS — Z01.818 PRE-OP TESTING: Primary | ICD-10-CM

## 2024-03-20 DIAGNOSIS — Z01.818 PREOP TESTING: ICD-10-CM

## 2024-03-20 PROCEDURE — 73700 CT LOWER EXTREMITY W/O DYE: CPT | Mod: 26,LT,, | Performed by: RADIOLOGY

## 2024-03-20 PROCEDURE — 73700 CT LOWER EXTREMITY W/O DYE: CPT | Mod: TC,LT

## 2024-03-20 NOTE — PRE ADMISSION SCREENING
Patient Name: Trino Head  YOB: 1965   MRN: [id number]     [facility]   Basic Mobility Inpatient Short Form 6 Clicks         How much difficulty does the patient currently have  Unable  A Lot  A Little  None      1. Turning over in bed (including adjusting bedclothes, sheets and blankets)?     1 []    2 []    3 [x]    4 []        2. Sitting down on and standing up from a chair with arms (e.g., wheelchair, bedside commode, etc.)     1 []  2 []  3 [x]     4 []      3. Moving from lying on back to sitting on the side of the bed?     1 []  2 []  3 []    4 [x]    How much help from another person does the patient currently need  Total  A Lot  A Little  None      4. Moving to and from a bed to a chair (including a wheelchair)?    1 []  2 []  3 []    4 [x]      5. Need to walk in hospital room?    1 []  2 []  3 []    4 [x]      6. Climbing 3-5 steps with a railing?    1 []  2 []  3 []    4 [x]       Raw Score:     22             CMS 0-100% Score:   20.91         %   Standardized Score:     53.28          CMS Modifier:       CJ                                   [facility]   Basic Mobility Inpatient Short Form 6 Clicks Score Conversion Table*         *Use this form to convert -PAC Basic Mobility Inpatient Raw Scores.   [facility] Inpatient Basic Mobility Short Form Scoring Example   1. Add the number values associated with the response to each item. For example, items totals yield a Raw Score of 21.   2. Match the raw score to the t-Scale scores (t-Scale score = 50.25, SE = 4.69).   3. Find the associated CMS % (CMS % = 28.97%).   4. Locate the correct CMS Functional Modifier Code, or G Code (G code = CJ)     NOTE: Each -PAC Short Form has a separate conversion table. Make sure that you use the correct conversion table.       Instruction Manual - page 45 contains conversion table

## 2024-03-20 NOTE — PRE ADMISSION SCREENING
JOINT CAMP ASSESSMENT    Name Trino Head   MRN [id number]    Age/Sex 59 y.o. male    Surgeon Dr. Martin Vo   Joint Camp Date 3/20/2024   Surgery Date 4/4/2024   Procedure Left Knee Arthroplasty   Insurance Payor: [employer] / Plan: BCBS ALL OUT OF STATE / Product Type: PPO /    Care Team Patient Care Team:  Titus Graham MD as PCP - General (Family Medicine)  Karl Rosen OD (Optometry)    Pharmacy   [id number] - [place], [place] - [address]  [address]  [place] [address]  Phone: [phone] Fax: [phone]     AM-PAC Score   22   Risk Assessment Score 7     Past Medical History:   Diagnosis Date    Arthritis     Digestive disorder     Hypercholesteremia     Hypertension     Renal disorder     kidney stone       Past Surgical History:   Procedure Laterality Date    COLONOSCOPY  2016    Dr. Chauhan-RTC 10 years    VASECTOMY  2012         Home Enviroment     Living Arrangement: Lives with spouse  Home Environment: 2-story house, number of outside stairs: 1, number of inside stairs: 14, bedroom on 1st floor, bathroom on 1st floor, walk-in shower  Home Safety Concerns: Pets in the home: cats (1).    DISCHARGE CAREGIVER/SUPPORT SYSTEM     Identified post-op caregiver: Patient has spouse / significant other.  Patient's caregiver(s) will be able to provide physical assistance. Patient will have someone to assist overnight.      Caregiver present at pre-op interview:  Yes      PRE-OPERATIVE FUNCTIONAL STATUS     Employment: Employed full time    Pre-op Functional Status: Patient is independent with mobility/ambulation, transfers, ADL's, IADL's.    Use of assistive device for ambulation: none  ADL: self care  ADL Limitations: difficulty with walking  Medical Restrictions: Unstable ambulation and Decreased range of motions in extremities    POTENTIAL BARRIERS TO DISCHARGE/POTENTIAL POST-OP COMPLICATIONS     Patient with hx of HTN. POSSIBLE SAME DAY  DISCHARGE.    DISCHARGE PLAN     Expected LOS of 1 days or less for joint replacement discussed with patient. We also discussed a discharge path of HH for approximately two weeks with a transition to outpatient PT on the third week given no post-op complications.      Patient in agreement with discharge plan: Yes    Discharge to: Discharge home with home health (PT/OT) x2 weeks with transition to outpatient PT     HH:  [facility] ([place], [place]). Patient disclosure form completed and sent to case management for upload to the medical record.      OP PT: [facility] ([facility]     Home DME: bedside commode    Needed DME at D/C: rolling walker     Rx: Per Dr. Martin Vo at discharge     Meds to Beds: Yes  Patient expected to discharge on Aspirin 81mg by mouth twice daily for DVT prophylaxis.

## 2024-03-20 NOTE — PRE ADMISSION SCREENING
"               [facility] Risk Assessment Scale    Patient Name: Trino Head  YOB: 1965  MRN: [id number]            RIsk Factor Measure Recommendation Patient Data Scale/Score   BMI >40 Reconsider surgery, weight loss   Estimated body mass index is 36.34 kg/m² as calculated from the following:    Height as of 2/7/24: 5' 8" (1.727 m).    Weight as of 2/7/24: 108.4 kg (239 lb).   [] 0 = 1 - 24.9  [] 1 = 25-29.9  [] 2 = 30-34.9  [x] 3 = 35-39.9  [] 4 = 40-44.9  [] 5 = 45-99.9   Hemoglobin AIC (if applicable) >9 Delay surgery until DM under control  Refer for:  Nutrition Therapy  Exercise   Medication    No results found for: "LABA1C", "HGBA1C"    Lab Results   Component Value Date    GLU 90 12/13/2023      [] 0 = 4.0-5.6  [] 1 = 5.7-6.4  [] 2 = 6.5-6.9  [] 3 = 7.0-7.9  [] 4 = 8.0-8.9  [] 5 = 9.0-12   Hemoglobin (Anemia) <9 Delay surgery   Correct anemia Lab Results   Component Value Date    HGB 14.5 06/07/2023    [] 20 - <9.0                    Albumin <3 Delay surgery &Workup Lab Results   Component Value Date    ALBUMIN 4.0 12/13/2023    [] 20 - <3.0   Smoking Cessation >4 Weeks Delay Surgery  Refer to OP Cessation Class    Never Smoker [] 20 - current smoker                                _____ PPD                    Hx of MI, PE, Arrhythmia, CVA, DVT <30 Days Delay Surgery    N/A [] 20      Infection Variable Delay surgery and re-evaluate   N/A [] 20 - recent/current infection     Depression (PHQ) >10 out of 27 Delay Surgery and re-evaluate  Medication  Counseling              [x] 0     []1     []2     []3      []4      [] 5                    (1-4)      (5-9)  (10-14)  (15-19)   (20-27)     Memory Impairment & Memory loss (Mini-Cog Screening Tool) Advanced dementia and/or Parkinson's Reconsider surgery     [x] 0     []1     []2     []3     []4     [] 5     Physical Conditioning (Modified AM-PAC Per Physical Therapy at [facility]) Unable to ambulate on day of surgery Delay surgery and " re-evaluate  Pre-Rehabilitation   (PT evaluation)       []  0   [x]4       []8     []12        []16     []20       (<20%)   (<40%)   (<60%)   (<80% )    (>80%)     Home Environment/Caregiver support  (Per /Navigator Interview)    Availability of basic services and/or approprate assistance during post-operative period Delay surgery and re-evaluate  Safe home environment  Health   1 week post-surgery  Transportation  availability  Ability to obtain DME/Medications post-op    [x] 0     []1     []2     []3     []4     [] 5  [x] 0     []1     []2     []3     []4     [] 5  [x] 0     []1     []2     []3     []4     [] 5  [x] 0     []1     []2     []3     []4     [] 5         MD Contact: Dr. Martin Vo Comments:  Total Score:  7

## 2024-03-20 NOTE — DISCHARGE INSTRUCTIONS
To confirm, Your doctor has instructed you that surgery is scheduled for: 4/4/24    Please report to [facility], Registration the morning of surgery. You must check-in and receive a wristband before going to your procedure.  [address] [place] [address] [place] [id number]    Pre-Op will call the afternoon prior to surgery between 1:00 and 6:00 PM with the final arrival time.  Phone number: [phone]    PLEASE NOTE:  The surgery schedule has many variables which may affect the time of your surgery case.  Family members should be available if your surgery time changes.  Plan to be here the day of your procedure between 4-6 hours.    MEDICATIONS:  TAKE ONLY THESE MEDICATIONS WITH A SMALL SIP OF WATER THE MORNING OF YOUR PROCEDURE:    SEE MED LIST          DO NOT TAKE THESE MEDICATIONS 5-7 DAYS PRIOR to your procedure or per your surgeon's request:   ASPIRIN, ALEVE, ADVIL, IBUPROFEN, FISH OIL VITAMIN E, HERBALS  (May take Tylenol)    ONLY if you are prescribed any types of blood thinners such as:  Aspirin, Coumadin, Plavix, Pradaxa, Xarelto, Aggrenox, Effient, Eliquis, Savasya, Brilinta, or any other, ask your surgeon whether you should stop taking them and how long before surgery you should stop.  You may also need to verify with the prescribing physician if it is ok to stop your medication.      INSTRUCTIONS IMPORTANT!!  Do not eat or drink anything between midnight and the time of your procedure- this includes gum, mints, and candy.  Do not smoke or drink alcoholic beverages 24 hours prior to your procedure.  Shower the night before AND the morning of your procedure with a Chlorhexidine wash such as Hibiclens or Dial antibacterial soap from the neck down.  Do not get it on your face or in your eyes.  You may use your own shampoo and face wash. This helps your skin to be as bacteria free as possible.    If you wear contact lenses, dentures, hearing aids or glasses, bring a container to put them in  during surgery and give to a family member for safe keeping.  Please leave all jewelry, piercing's and valuables at home. You must remove your false eyelashes prior to surgery.    DO NOT remove hair from the surgery site.  Do not shave the incision site unless you are given specific instructions to do so.    ONLY if you have been diagnosed with sleep apnea please bring your C-PAP machine.  ONLY if you wear home oxygen please bring your portable oxygen tank the day of your procedure.  ONLY if you have a history of OPEN HEART SURGERY you will need a clearance from your Cardiologist per Anesthesia.      ONLY for patients requiring bowel prep, written instructions will be given by your doctor's office.  ONLY if you have a neuro stimulator, please bring the controller with you the morning of surgery  ONLY if a type and screen test is needed before surgery, please return:  If your doctor has scheduled you for an overnight stay, bring a small overnight bag with any personal items you need.  Make arrangements in advance for transportation home by a responsible adult. You can not go home in an uber or a cab per hospital policy.  It is not safe to drive a vehicle during the 24 hours after anesthesia.          All  facilities and properties are tobacco free.  Smoking is NOT allowed.   If you have any questions about these instructions, call Pre-Op Admit  Nursing at [phone] or the Pre-Op Day Surgery Unit at [phone].

## 2024-03-27 ENCOUNTER — HOSPITAL ENCOUNTER (OUTPATIENT)
Dept: PREADMISSION TESTING | Facility: HOSPITAL | Age: 59
Discharge: HOME | End: 2024-03-27
Attending: ORTHOPAEDIC SURGERY
Payer: COMMERCIAL

## 2024-04-03 ENCOUNTER — ANESTHESIA EVENT (OUTPATIENT)
Dept: SURGERY | Facility: HOSPITAL | Age: 59
End: 2024-04-03
Payer: COMMERCIAL

## 2024-04-03 RX ORDER — SODIUM CHLORIDE 9 MG/ML
INJECTION, SOLUTION INTRAVENOUS CONTINUOUS
Status: CANCELLED | OUTPATIENT
Start: 2024-04-03

## 2024-04-04 ENCOUNTER — HOSPITAL ENCOUNTER (OUTPATIENT)
Facility: HOSPITAL | Age: 59
Discharge: HOME | End: 2024-04-04
Attending: ORTHOPAEDIC SURGERY | Admitting: ORTHOPAEDIC SURGERY
Payer: COMMERCIAL

## 2024-04-04 ENCOUNTER — ANESTHESIA (OUTPATIENT)
Dept: SURGERY | Facility: HOSPITAL | Age: 59
End: 2024-04-04
Payer: COMMERCIAL

## 2024-04-04 VITALS
TEMPERATURE: 98 F | SYSTOLIC BLOOD PRESSURE: 141 MMHG | WEIGHT: 239 LBS | DIASTOLIC BLOOD PRESSURE: 83 MMHG | RESPIRATION RATE: 16 BRPM | BODY MASS INDEX: 36.22 KG/M2 | OXYGEN SATURATION: 97 % | HEART RATE: 65 BPM | HEIGHT: 68 IN

## 2024-04-04 DIAGNOSIS — Z96.652 S/P TOTAL KNEE ARTHROPLASTY, LEFT: Primary | ICD-10-CM

## 2024-04-04 DIAGNOSIS — M17.12 PRIMARY OSTEOARTHRITIS OF LEFT KNEE: ICD-10-CM

## 2024-04-04 PROCEDURE — 25000003 PHARM REV CODE 250: Performed by: ANESTHESIOLOGY

## 2024-04-04 PROCEDURE — 37000009 HC ANESTHESIA EA ADD 15 MINS: Performed by: ORTHOPAEDIC SURGERY

## 2024-04-04 PROCEDURE — 97161 PT EVAL LOW COMPLEX 20 MIN: CPT

## 2024-04-04 PROCEDURE — 63600175 PHARM REV CODE 636 W HCPCS: Performed by: ORTHOPAEDIC SURGERY

## 2024-04-04 PROCEDURE — 63600175 PHARM REV CODE 636 W HCPCS

## 2024-04-04 PROCEDURE — 64447 NJX AA&/STRD FEMORAL NRV IMG: CPT | Mod: 59 | Performed by: ANESTHESIOLOGY

## 2024-04-04 PROCEDURE — 25000003 PHARM REV CODE 250: Performed by: ORTHOPAEDIC SURGERY

## 2024-04-04 PROCEDURE — C9290 INJ, BUPIVACAINE LIPOSOME: HCPCS | Performed by: ANESTHESIOLOGY

## 2024-04-04 PROCEDURE — 36000712 HC OR TIME LEV V 1ST 15 MIN: Performed by: ORTHOPAEDIC SURGERY

## 2024-04-04 PROCEDURE — 36000713 HC OR TIME LEV V EA ADD 15 MIN: Performed by: ORTHOPAEDIC SURGERY

## 2024-04-04 PROCEDURE — C1776 JOINT DEVICE (IMPLANTABLE): HCPCS | Performed by: ORTHOPAEDIC SURGERY

## 2024-04-04 PROCEDURE — D9220A PRA ANESTHESIA: Mod: ANES,,, | Performed by: ANESTHESIOLOGY

## 2024-04-04 PROCEDURE — 63600175 PHARM REV CODE 636 W HCPCS: Performed by: ANESTHESIOLOGY

## 2024-04-04 PROCEDURE — 71000039 HC RECOVERY, EACH ADD'L HOUR: Performed by: ORTHOPAEDIC SURGERY

## 2024-04-04 PROCEDURE — C1713 ANCHOR/SCREW BN/BN,TIS/BN: HCPCS | Performed by: ORTHOPAEDIC SURGERY

## 2024-04-04 PROCEDURE — 25000003 PHARM REV CODE 250: Performed by: NURSE ANESTHETIST, CERTIFIED REGISTERED

## 2024-04-04 PROCEDURE — 27447 TOTAL KNEE ARTHROPLASTY: CPT | Mod: LT,,, | Performed by: ORTHOPAEDIC SURGERY

## 2024-04-04 PROCEDURE — 37000008 HC ANESTHESIA 1ST 15 MINUTES: Performed by: ORTHOPAEDIC SURGERY

## 2024-04-04 PROCEDURE — 71000015 HC POSTOP RECOV 1ST HR: Performed by: ORTHOPAEDIC SURGERY

## 2024-04-04 PROCEDURE — 71000033 HC RECOVERY, INTIAL HOUR: Performed by: ORTHOPAEDIC SURGERY

## 2024-04-04 PROCEDURE — 71000016 HC POSTOP RECOV ADDL HR: Performed by: ORTHOPAEDIC SURGERY

## 2024-04-04 PROCEDURE — 27201423 OPTIME MED/SURG SUP & DEVICES STERILE SUPPLY: Performed by: ORTHOPAEDIC SURGERY

## 2024-04-04 PROCEDURE — 99900035 HC TECH TIME PER 15 MIN (STAT)

## 2024-04-04 PROCEDURE — 27200750 HC INSULATED NEEDLE/ STIMUPLEX: Performed by: ANESTHESIOLOGY

## 2024-04-04 PROCEDURE — 27200688 HC TRAY, SPINAL-HYPER/ ISOBARIC: Performed by: ANESTHESIOLOGY

## 2024-04-04 PROCEDURE — 97110 THERAPEUTIC EXERCISES: CPT

## 2024-04-04 PROCEDURE — 63600175 PHARM REV CODE 636 W HCPCS: Performed by: NURSE ANESTHETIST, CERTIFIED REGISTERED

## 2024-04-04 PROCEDURE — 97116 GAIT TRAINING THERAPY: CPT

## 2024-04-04 PROCEDURE — 99499 UNLISTED E&M SERVICE: CPT | Mod: ,,, | Performed by: PHYSICIAN ASSISTANT

## 2024-04-04 PROCEDURE — C1729 CATH, DRAINAGE: HCPCS | Performed by: ORTHOPAEDIC SURGERY

## 2024-04-04 PROCEDURE — D9220A PRA ANESTHESIA: Mod: CRNA,,, | Performed by: NURSE ANESTHETIST, CERTIFIED REGISTERED

## 2024-04-04 PROCEDURE — 94799 UNLISTED PULMONARY SVC/PX: CPT

## 2024-04-04 DEVICE — SIMPLEX® HV IS A FAST-SETTING ACRYLIC RESIN FOR USE IN BONE SURGERY. MIXING THE TWO SEPARATE STERILE COMPONENTS PRODUCES A DUCTILE BONE CEMENT WHICH, AFTER HARDENING, FIXES THE IMPLANT AND TRANSFERS STRESSES PRODUCED DURING MOVEMENT EVENLY TO THE BONE. SIMPLEX® HV CEMENT POWDER ALSO CONTAINS INSOLUBLE ZIRCONIUM DIOXIDE AS AN X-RAY CONTRAST MEDIUM. SIMPLEX® HV DOES NOT EMIT A SIGNAL AND DOES NOT POSE A SAFETY RISK IN A MAGNETIC RESONANCE ENVIRONMENT.
Type: IMPLANTABLE DEVICE | Site: KNEE | Status: FUNCTIONAL
Brand: SIMPLEX HV

## 2024-04-04 DEVICE — BONE PINS (3.2MM X 110MM): Type: IMPLANTABLE DEVICE | Site: KNEE | Status: FUNCTIONAL

## 2024-04-04 DEVICE — BONE PINS (3.2MM X 140MM): Type: IMPLANTABLE DEVICE | Site: KNEE | Status: FUNCTIONAL

## 2024-04-04 DEVICE — PRIMARY TIBIAL BASEPLATE
Type: IMPLANTABLE DEVICE | Site: KNEE | Status: FUNCTIONAL
Brand: TRIATHLON

## 2024-04-04 DEVICE — TIBIAL BEARING INSERT - CS
Type: IMPLANTABLE DEVICE | Site: KNEE | Status: FUNCTIONAL
Brand: TRIATHLON

## 2024-04-04 DEVICE — SYMMETRIC PATELLA
Type: IMPLANTABLE DEVICE | Site: KNEE | Status: FUNCTIONAL
Brand: TRIATHLON

## 2024-04-04 DEVICE — CRUCIATE RETAINING FEMORAL
Type: IMPLANTABLE DEVICE | Site: KNEE | Status: FUNCTIONAL
Brand: TRIATHLON

## 2024-04-04 RX ORDER — FENTANYL CITRATE 50 UG/ML
INJECTION, SOLUTION INTRAMUSCULAR; INTRAVENOUS
Status: DISCONTINUED | OUTPATIENT
Start: 2024-04-04 | End: 2024-04-04

## 2024-04-04 RX ORDER — PROCHLORPERAZINE EDISYLATE 5 MG/ML
5 INJECTION INTRAMUSCULAR; INTRAVENOUS EVERY 4 HOURS PRN
Status: DISCONTINUED | OUTPATIENT
Start: 2024-04-04 | End: 2024-04-04 | Stop reason: HOSPADM

## 2024-04-04 RX ORDER — CELECOXIB 100 MG/1
200 CAPSULE ORAL ONCE
Status: COMPLETED | OUTPATIENT
Start: 2024-04-04 | End: 2024-04-04

## 2024-04-04 RX ORDER — MIDAZOLAM HYDROCHLORIDE 1 MG/ML
0.5 INJECTION, SOLUTION INTRAMUSCULAR; INTRAVENOUS
Status: DISCONTINUED | OUTPATIENT
Start: 2024-04-04 | End: 2024-04-04 | Stop reason: HOSPADM

## 2024-04-04 RX ORDER — HYDROCODONE BITARTRATE AND ACETAMINOPHEN 5; 325 MG/1; MG/1
1 TABLET ORAL EVERY 4 HOURS PRN
Status: CANCELLED | OUTPATIENT
Start: 2024-04-04

## 2024-04-04 RX ORDER — TRANEXAMIC ACID 100 MG/ML
INJECTION, SOLUTION INTRAVENOUS
Status: DISCONTINUED | OUTPATIENT
Start: 2024-04-04 | End: 2024-04-04

## 2024-04-04 RX ORDER — FAMOTIDINE 20 MG/1
20 TABLET, FILM COATED ORAL 2 TIMES DAILY
Status: CANCELLED | OUTPATIENT
Start: 2024-04-04

## 2024-04-04 RX ORDER — ACETAMINOPHEN 500 MG
1000 TABLET ORAL
Status: COMPLETED | OUTPATIENT
Start: 2024-04-04 | End: 2024-04-04

## 2024-04-04 RX ORDER — LIDOCAINE HYDROCHLORIDE 10 MG/ML
1 INJECTION, SOLUTION EPIDURAL; INFILTRATION; INTRACAUDAL; PERINEURAL ONCE
Status: DISCONTINUED | OUTPATIENT
Start: 2024-04-04 | End: 2024-04-04 | Stop reason: HOSPADM

## 2024-04-04 RX ORDER — MUPIROCIN 20 MG/G
1 OINTMENT TOPICAL 2 TIMES DAILY
Status: CANCELLED | OUTPATIENT
Start: 2024-04-04 | End: 2024-04-09

## 2024-04-04 RX ORDER — DIPHENHYDRAMINE HYDROCHLORIDE 50 MG/ML
25 INJECTION INTRAMUSCULAR; INTRAVENOUS EVERY 6 HOURS PRN
Status: DISCONTINUED | OUTPATIENT
Start: 2024-04-04 | End: 2024-04-04 | Stop reason: HOSPADM

## 2024-04-04 RX ORDER — MEPERIDINE HYDROCHLORIDE 50 MG/ML
12.5 INJECTION INTRAMUSCULAR; INTRAVENOUS; SUBCUTANEOUS ONCE
Status: DISCONTINUED | OUTPATIENT
Start: 2024-04-04 | End: 2024-04-04 | Stop reason: HOSPADM

## 2024-04-04 RX ORDER — SODIUM CHLORIDE 0.9 % (FLUSH) 0.9 %
10 SYRINGE (ML) INJECTION
Status: DISCONTINUED | OUTPATIENT
Start: 2024-04-04 | End: 2024-04-04 | Stop reason: HOSPADM

## 2024-04-04 RX ORDER — DEXAMETHASONE SODIUM PHOSPHATE 4 MG/ML
INJECTION, SOLUTION INTRA-ARTICULAR; INTRALESIONAL; INTRAMUSCULAR; INTRAVENOUS; SOFT TISSUE
Status: DISCONTINUED | OUTPATIENT
Start: 2024-04-04 | End: 2024-04-04

## 2024-04-04 RX ORDER — BUPIVACAINE HYDROCHLORIDE 5 MG/ML
INJECTION, SOLUTION EPIDURAL; INTRACAUDAL
Status: COMPLETED | OUTPATIENT
Start: 2024-04-04 | End: 2024-04-04

## 2024-04-04 RX ORDER — OXYCODONE HCL 10 MG/1
10 TABLET, FILM COATED, EXTENDED RELEASE ORAL ONCE
Status: COMPLETED | OUTPATIENT
Start: 2024-04-04 | End: 2024-04-04

## 2024-04-04 RX ORDER — FENTANYL CITRATE 50 UG/ML
25 INJECTION, SOLUTION INTRAMUSCULAR; INTRAVENOUS EVERY 5 MIN PRN
Status: DISCONTINUED | OUTPATIENT
Start: 2024-04-04 | End: 2024-04-04 | Stop reason: HOSPADM

## 2024-04-04 RX ORDER — OXYCODONE HYDROCHLORIDE 5 MG/1
5 TABLET ORAL
Status: DISCONTINUED | OUTPATIENT
Start: 2024-04-04 | End: 2024-04-04 | Stop reason: HOSPADM

## 2024-04-04 RX ORDER — MIDAZOLAM HYDROCHLORIDE 1 MG/ML
INJECTION INTRAMUSCULAR; INTRAVENOUS
Status: DISCONTINUED | OUTPATIENT
Start: 2024-04-04 | End: 2024-04-04

## 2024-04-04 RX ORDER — LIDOCAINE HYDROCHLORIDE 20 MG/ML
INJECTION INTRAVENOUS
Status: DISCONTINUED | OUTPATIENT
Start: 2024-04-04 | End: 2024-04-04

## 2024-04-04 RX ORDER — PROPOFOL 10 MG/ML
VIAL (ML) INTRAVENOUS CONTINUOUS PRN
Status: DISCONTINUED | OUTPATIENT
Start: 2024-04-04 | End: 2024-04-04

## 2024-04-04 RX ORDER — ONDANSETRON HYDROCHLORIDE 2 MG/ML
INJECTION, SOLUTION INTRAVENOUS
Status: DISCONTINUED | OUTPATIENT
Start: 2024-04-04 | End: 2024-04-04

## 2024-04-04 RX ORDER — NAPROXEN SODIUM 220 MG/1
81 TABLET, FILM COATED ORAL 2 TIMES DAILY
Status: DISCONTINUED | OUTPATIENT
Start: 2024-04-04 | End: 2024-04-04 | Stop reason: HOSPADM

## 2024-04-04 RX ORDER — PHENYLEPHRINE HYDROCHLORIDE 10 MG/ML
INJECTION INTRAVENOUS
Status: DISCONTINUED | OUTPATIENT
Start: 2024-04-04 | End: 2024-04-04

## 2024-04-04 RX ORDER — ONDANSETRON HYDROCHLORIDE 2 MG/ML
4 INJECTION, SOLUTION INTRAVENOUS ONCE
Status: DISCONTINUED | OUTPATIENT
Start: 2024-04-04 | End: 2024-04-04 | Stop reason: HOSPADM

## 2024-04-04 RX ORDER — ASPIRIN 81 MG/1
81 TABLET ORAL 2 TIMES DAILY
COMMUNITY
Start: 2024-04-04 | End: 2024-05-02

## 2024-04-04 RX ORDER — OXYCODONE AND ACETAMINOPHEN 10; 325 MG/1; MG/1
1 TABLET ORAL EVERY 4 HOURS PRN
Qty: 42 TABLET | Refills: 0 | Status: SHIPPED | OUTPATIENT
Start: 2024-04-04 | End: 2024-04-11

## 2024-04-04 RX ORDER — PROPRANOLOL HYDROCHLORIDE 80 MG/1
80 TABLET ORAL ONCE
Status: DISCONTINUED | OUTPATIENT
Start: 2024-04-04 | End: 2024-04-04 | Stop reason: HOSPADM

## 2024-04-04 RX ADMIN — OXYCODONE 5 MG: 5 TABLET ORAL at 10:04

## 2024-04-04 RX ADMIN — TRANEXAMIC ACID 1000 MG: 100 INJECTION, SOLUTION INTRAVENOUS at 07:04

## 2024-04-04 RX ADMIN — FENTANYL CITRATE 50 MCG: 50 INJECTION, SOLUTION INTRAMUSCULAR; INTRAVENOUS at 06:04

## 2024-04-04 RX ADMIN — SODIUM CHLORIDE, SODIUM GLUCONATE, SODIUM ACETATE, POTASSIUM CHLORIDE AND MAGNESIUM CHLORIDE: 526; 502; 368; 37; 30 INJECTION, SOLUTION INTRAVENOUS at 07:04

## 2024-04-04 RX ADMIN — BUPIVACAINE 20 ML: 13.3 INJECTION, SUSPENSION, LIPOSOMAL INFILTRATION at 06:04

## 2024-04-04 RX ADMIN — ROPIVACAINE HYDROCHLORIDE: 5 INJECTION, SOLUTION EPIDURAL; INFILTRATION; PERINEURAL at 07:04

## 2024-04-04 RX ADMIN — ONDANSETRON 4 MG: 2 INJECTION INTRAMUSCULAR; INTRAVENOUS at 07:04

## 2024-04-04 RX ADMIN — CELECOXIB 200 MG: 100 CAPSULE ORAL at 06:04

## 2024-04-04 RX ADMIN — LIDOCAINE HYDROCHLORIDE 20 MG: 20 INJECTION, SOLUTION INTRAVENOUS at 07:04

## 2024-04-04 RX ADMIN — FENTANYL CITRATE 50 MCG: 50 INJECTION, SOLUTION INTRAMUSCULAR; INTRAVENOUS at 07:04

## 2024-04-04 RX ADMIN — OXYCODONE HYDROCHLORIDE 10 MG: 10 TABLET, FILM COATED, EXTENDED RELEASE ORAL at 06:04

## 2024-04-04 RX ADMIN — MIDAZOLAM HYDROCHLORIDE 2 MG: 1 INJECTION INTRAMUSCULAR; INTRAVENOUS at 06:04

## 2024-04-04 RX ADMIN — PROPOFOL 50 MCG/KG/MIN: 10 INJECTION, EMULSION INTRAVENOUS at 07:04

## 2024-04-04 RX ADMIN — SODIUM CHLORIDE, SODIUM GLUCONATE, SODIUM ACETATE, POTASSIUM CHLORIDE AND MAGNESIUM CHLORIDE: 526; 502; 368; 37; 30 INJECTION, SOLUTION INTRAVENOUS at 06:04

## 2024-04-04 RX ADMIN — DEXAMETHASONE SODIUM PHOSPHATE 8 MG: 4 INJECTION, SOLUTION INTRA-ARTICULAR; INTRALESIONAL; INTRAMUSCULAR; INTRAVENOUS; SOFT TISSUE at 07:04

## 2024-04-04 RX ADMIN — ACETAMINOPHEN 1000 MG: 500 TABLET ORAL at 06:04

## 2024-04-04 RX ADMIN — MEPIVACAINE HYDROCHLORIDE 2.5 ML: 20 INJECTION, SOLUTION EPIDURAL; INFILTRATION at 07:04

## 2024-04-04 RX ADMIN — BUPIVACAINE HYDROCHLORIDE 10 ML: 5 INJECTION, SOLUTION EPIDURAL; INTRACAUDAL; PERINEURAL at 06:04

## 2024-04-04 RX ADMIN — PHENYLEPHRINE HYDROCHLORIDE 200 MCG: 10 INJECTION INTRAVENOUS at 07:04

## 2024-04-04 RX ADMIN — CEFAZOLIN 2 G: 2 INJECTION, POWDER, FOR SOLUTION INTRAMUSCULAR; INTRAVENOUS at 07:04

## 2024-04-04 RX ADMIN — PHENYLEPHRINE HYDROCHLORIDE 100 MCG: 10 INJECTION INTRAVENOUS at 07:04

## 2024-04-04 NOTE — PT/OT/SLP EVAL
Physical Therapy Evaluation and Discharge Note    Patient Name:  Trino Head   MRN:  [id number]    Recommendations:     Discharge Recommendations: Low Intensity Therapy  Discharge Equipment Recommendations: none   Barriers to discharge: None    Assessment:     Trino Head is a 59 y.o. male admitted with a medical diagnosis of Primary osteoarthritis of left knee. .  Pt seen at post 06, alert and eager for therapies. Pt completed thera ex in supine. Min assist to sit EOB and to stand with RW. Pt ambulated 250ft with RW min assist with no seated rest. Pt also completed 1 threshold step training with min assist with VC for technique. OOB chair post PT.    Recent Surgery: Procedure(s) (LRB):  ROBOTIC ARTHROPLASTY, KNEE, TOTAL, LEFT (Left) Day of Surgery    Plan:     During this hospitalization, patient does not require further acute PT services.  Please re-consult if situation changes.      Subjective   Stated is doing well and pleased about ability to walk  Spouse present and assisting with care  Pt stated that he voided 3x already prior to PT  Chief Complaint: none  Patient/Family Comments/goals: get well  Pain/Comfort:  Pain Rating 1: 0/10    Patients cultural, spiritual, Orthodoxy conflicts given the current situation:      Living Environment:  Home with spouse with 1 threshold step  Prior to admission, patients level of function was indep/employed.  Equipment used at home: none.  DME owned (not currently used): rolling walker.  Upon discharge, patient will have assistance from family.    Objective:     Communicated with nurse Pandey prior to session.  Patient found HOB elevated with   upon PT entry to room.    General Precautions: Standard, fall    Orthopedic Precautions:LLE weight bearing as tolerated   Braces: N/A  Respiratory Status: Room air    Exams:  Postural Exam:  Patient presented with the following abnormalities:    -       BMI 36.34  RLE ROM: WFL  RLE Strength: WFL  LLE ROM: Deficits: LK flexion  ~80*  LLE Strength: Deficits: 3/5    Functional Mobility:  Bed Mobility:     Scooting: minimum assistance  Supine to Sit: minimum assistance  Transfers:     Sit to Stand:  minimum assistance with rolling walker  Bed to Chair: minimum assistance with  rolling walker  using  Stand Pivot  Gait: 250ft with RW min/CGA  Stairs:  Pt ascended/descended 1 stair(s) with Rolling Walker with no handrails with Minimal Assistance.     AM-PAC 6 CLICK MOBILITY  Total Score:16       Treatment and Education:  Patient was educated on the importance of OOB activity and functional mobility to negate negative effects of prolonged bed rest during hospitalization, safe transfers and ambulation, and D/C planning   Thera ex with AP,QS/GS,SLR and HS x 10-20 reps  Gait at hallways with RW with VC for technique  OOB chair post PT    AM-PAC 6 CLICK MOBILITY  Total Score:16     Patient left up in chair with all lines intact, call button in reach, and spouse present.    GOALS:   Multidisciplinary Problems       Physical Therapy Goals       Not on file                    History:     Past Medical History:   Diagnosis Date    Arthritis     Digestive disorder     Hypercholesteremia     Hypertension     Renal disorder     kidney stone       Past Surgical History:   Procedure Laterality Date    COLONOSCOPY  2016    Dr. Chauhan-RTC 10 years    VASECTOMY  2012       Time Tracking:     PT Received On: 04/04/24  PT Start Time: 1136     PT Stop Time: 1214  PT Total Time (min): 38 min     Billable Minutes: Evaluation 10, Gait Training 18, and Therapeutic Exercise 10      04/04/2024

## 2024-04-04 NOTE — ANESTHESIA PREPROCEDURE EVALUATION
04/04/2024  Trino Head is a 59 y.o., male.      Pre-op Assessment    I have reviewed the NPO Status.   I have reviewed the Medications.     Review of Systems  Anesthesia Hx:  No problems with previous Anesthesia                Cardiovascular:  Exercise tolerance: good   Hypertension                                        Pulmonary:  Pulmonary Normal                       Renal/:  Chronic Renal Disease renal calculi               Hepatic/GI:     GERD, well controlled             Musculoskeletal:  Arthritis               Neurological:  Neurology Normal                                      Endocrine:        Obesity / BMI > 30      Physical Exam  General: Well nourished    Airway:  Mallampati: II   Mouth Opening: Normal  TM Distance: Normal  Tongue: Normal  Neck ROM: Normal ROM    Dental:  Intact    Chest/Lungs:  Clear to auscultation, Normal Respiratory Rate        Anesthesia Plan  Type of Anesthesia, risks & benefits discussed:    Anesthesia Type: Spinal  Intra-op Monitoring Plan: Standard ASA Monitors  Post Op Pain Control Plan: multimodal analgesia, IV/PO Opioids PRN and peripheral nerve block  Induction:  IV  Informed Consent: Patient consented to blood products? Yes  ASA Score: 2  Day of Surgery Review of History & Physical: H&P Update referred to the surgeon/provider.    Ready For Surgery From Anesthesia Perspective.     .

## 2024-04-04 NOTE — DISCHARGE INSTRUCTIONS
"Discharge Instructions: After Your Surgery/Procedure  Youve just had surgery. During surgery you were given medicine called anesthesia to keep you relaxed and free of pain. After surgery you may have some pain or nausea. This is common. Here are some tips for feeling better and getting well after surgery.     Stay on schedule with your medication.   Going home  Your doctor or nurse will show you how to take care of yourself when you go home. He or she will also answer your questions. Have an adult family member or friend drive you home.      For your safety we recommend these precaution for the first 24 hours after your procedure:  Do not drive or use heavy equipment.  Do not make important decisions or sign legal papers.  Do not drink alcohol.  Have someone stay with you, if needed. He or she can watch for problems and help keep you safe.  Your concentration, balance, coordination, and judgement may be impaired for many hours after anesthesia.  Use caution when ambulating or standing up.     You may feel weak and "washed out" after anesthesia and surgery.      Subtle residual effects of general anesthesia or sedation with regional / local anesthesia can last more than 24 hours.  Rest for the remainder of the day or longer if your Doctor/Surgeon has advised you to do so.  Although you may feel normal within the first 24 hours, your reflexes and mental ability may be impaired without you realizing it.  You may feel dizzy, lightheaded or sleepy for 24 hours or longer.      Be sure to go to all follow-up visits with your doctor. And rest after your surgery for as long as your doctor tells you to.  Coping with pain  If you have pain after surgery, pain medicine will help you feel better. Take it as told, before pain becomes severe. Also, ask your doctor or pharmacist about other ways to control pain. This might be with heat, ice, or relaxation. And follow any other instructions your surgeon or nurse gives you.  Tips " for taking pain medicine  To get the best relief possible, remember these points:  Pain medicines can upset your stomach. Taking them with a little food may help.  Most pain relievers taken by mouth need at least 20 to 30 minutes to start to work.  Taking medicine on a schedule can help you remember to take it. Try to time your medicine so that you can take it before starting an activity. This might be before you get dressed, go for a walk, or sit down for dinner.  Constipation is a common side effect of pain medicines. Call your doctor before taking any medicines such as laxatives or stool softeners to help ease constipation. Also ask if you should skip any foods. Drinking lots of fluids and eating foods such as fruits and vegetables that are high in fiber can also help. Remember, do not take laxatives unless your surgeon has prescribed them.  Drinking alcohol and taking pain medicine can cause dizziness and slow your breathing. It can even be deadly. Do not drink alcohol while taking pain medicine.  Pain medicine can make you react more slowly to things. Do not drive or run machinery while taking pain medicine.  Your health care provider may tell you to take acetaminophen to help ease your pain. Ask him or her how much you are supposed to take each day. Acetaminophen or other pain relievers may interact with your prescription medicines or other over-the-counter (OTC) drugs. Some prescription medicines have acetaminophen and other ingredients. Using both prescription and OTC acetaminophen for pain can cause you to overdose. Read the labels on your OTC medicines with care. This will help you to clearly know the list of ingredients, how much to take, and any warnings. It may also help you not take too much acetaminophen. If you have questions or do not understand the information, ask your pharmacist or health care provider to explain it to you before you take the OTC medicine.  Managing nausea  Some people have an  upset stomach after surgery. This is often because of anesthesia, pain, or pain medicine, or the stress of surgery. These tips will help you handle nausea and eat healthy foods as you get better. If you were on a special food plan before surgery, ask your doctor if you should follow it while you get better. These tips may help:  Do not push yourself to eat. Your body will tell you when to eat and how much.  Start off with clear liquids and soup. They are easier to digest.  Next try semi-solid foods, such as mashed potatoes, applesauce, and gelatin, as you feel ready.  Slowly move to solid foods. Dont eat fatty, rich, or spicy foods at first.  Do not force yourself to have 3 large meals a day. Instead eat smaller amounts more often.  Take pain medicines with a small amount of solid food, such as crackers or toast, to avoid nausea.     Call your surgeon if  You still have pain an hour after taking medicine. The medicine may not be strong enough.  You feel too sleepy, dizzy, or groggy. The medicine may be too strong.  You have side effects like nausea, vomiting, or skin changes, such as rash, itching, or hives.       If you have obstructive sleep apnea  You were given anesthesia medicine during surgery to keep you comfortable and free of pain. After surgery, you may have more apnea spells because of this medicine and other medicines you were given. The spells may last longer than usual.   At home:  Keep using the continuous positive airway pressure (CPAP) device when you sleep. Unless your health care provider tells you not to, use it when you sleep, day or night. CPAP is a common device used to treat obstructive sleep apnea.  Talk with your provider before taking any pain medicine, muscle relaxants, or sedatives. Your provider will tell you about the possible dangers of taking these medicines.  © 0303-9250 [employer]. [address] [place] [address], [place] [address]. All rights reserved. This information is  not intended as a substitute for professional medical care. Always follow your healthcare professional's instructions.                                Using an Incentive Spirometer    An incentive spirometer is a device that helps you do deep breathing exercises. These exercises expand your lungs, aid in circulation, and help prevent pneumonia. Deep breathing exercises also help you breathe better and improve the function of your lungs by:  Keeping your lungs clear  Strengthening your breathing muscles  Helping prevent respiratory complications or problems  The incentive spirometer gives you a way to take an active part in recover. A nurse or therapist will teach you breathing exercises. To do these exercises, you will breathe in through your mouth and not your nose. The incentive spirometer only works correctly if you breathe in through your mouth.  Steps to clear lungs  Step 1. Exhale normally. Then, inhale normally.  Relax and breathe out.  Step 2. Place your lips tightly around the mouthpiece.  Make sure the device is upright and not tilted.  Step 3. Inhale as much air as you can through the mouthpiece (don't breath through your nose).  Inhale slowly and deeply.  Hold your breath long enough to keep the balls or disk raised for at least 3 to 5 seconds, or as instructed by your healthcare provider.  Some spirometers have an indicator to let you know that you are breathing in too fast. If the indicator goes off, breathe in more slowly.  Step 4. Repeat the exercise regularly.  Do this exercise every hour while you're awake, or as instructed by your healthcare provider.  If you were taught deep breathing and coughing exercises, do them regularly as instructed by your healthcare provider.                     Post op instructions for prevention of DVT  What is deep vein thrombosis?  Deep vein thrombosis (DVT) is the medical term for blood clots in the deep veins of the leg.  These blood clots can be dangerous.  A DVT  can block a blood vessel and keep blood from getting where it needs to go.  Another problem is that the clot can travel to other parts of the body such as the lungs.  A clot that travels to the lungs is called a pulmonary embolus (PE) and can cause serious problems with breathing which can lead to death.  Am I at risk for DVT/PE?  If you are not very active, you are at risk of DVT.  Anyone confined to bed, sitting for long periods of time, recovering from surgery, etc. increases the risk of DVT.  Other risk factors are cancer diagnosis, certain medications, estrogen replacement in any form,older age, obesity, pregnancy, smoking, history of clotting disorders, and dehydration.  How will I know if I have a DVT?  Swelling in the lower leg  Pain  Warmth, redness, hardness or bulging of the vein  If you have any of these symptoms, call your doctors office right away.  Some people will not have any symptoms until the clot moves to the lungs.  What are the symptoms of a PE?  Panting, shortness of breath, or trouble breathing  Sharp, knife-like chest pain when you breathe  Coughing or coughing up blood  Rapid heartbeat  If you have any of these symptoms or get worse quickly, call 911 for emergency treatment.  How can I prevent a DVT?  Avoid long periods of inactivity and dont cross your legs--get up and walk around every hour or so.  Stay active--walking after surgery is highly encouraged.  This means you should get out of the house and walk in the neighborhood.  Going up and down stairs will not impair healing (unless advised against such activity by your doctor).    Drink plenty of noncaffeinated, nonalcoholic fluids each day to prevent dehydration.  Wear special support stockings, if they have been advised by your doctor.  If you travel, stop at least once an hour and walk around.  Avoid smoking (assistance with stopping is available through your healthcare provider)  Always notify your doctor if you are not able to  follow the post operative instructions that are given to you at the time of discharge.  It may be necessary to prescribe one of the medications available to prevent DVT.We hope your stay was comfortable as you heal now, mend and rest.    For we have enjoyed taking care of you by giving your our best.    And as you get better, by regaining your health and strength;   We count it as a privilege to have served you and hope your time at [facility] was well spent.      Thank  You!!!                    Exparel(bupivacaine) has been injected to provide approximately 72 hours of reduced pain after your surgery.  Do not remove the bracelet for five days.  Report to your doctor as soon as possible if you experience any of the following:   Restlessness   Anxiety   Speech problems    Lightheadedness   Numbness and tingling of the mouth and lips   Seizures    Metallic taste   Blurred vision   Tremors    Twitching   Depression   Extreme drowsiness  Avoid additional use of local anesthetics (such as dental procedures) for five days (96 hours).

## 2024-04-04 NOTE — H&P
CC/Indication for Procedure: 59 y.o. male with Primary osteoarthritis of left knee [M17.12].    Patient scheduled for [place] TOTAL KNEE ARTHROPLASTY [[id number]] (ROBOTIC ARTHROPLASTY, KNEE, TOTAL, LEFT)  [place] TOTAL KNEE ARTHROPLASTY [[id number]].    Past Medical History:   Diagnosis Date    Arthritis     Digestive disorder     Hypercholesteremia     Hypertension     Renal disorder     kidney stone     Past Surgical History:   Procedure Laterality Date    COLONOSCOPY  2016    Dr. Chauhan-RTC 10 years    VASECTOMY  2012     History reviewed. No pertinent family history.  Social History     Socioeconomic History    Marital status:    Tobacco Use    Smoking status: Never    Smokeless tobacco: Never   Substance and Sexual Activity    Alcohol use: Yes     Alcohol/week: 1.0 standard drink of alcohol     Types: 1 Cans of beer per week     Comment: socially    Drug use: Never    Sexual activity: Yes     Partners: Female     Birth control/protection: See Surgical Hx     Social Determinants of Health     Financial Resource Strain: Patient Declined (12/14/2023)    Overall Financial Resource Strain (CARDIA)     Difficulty of Paying Living Expenses: Patient declined   Food Insecurity: Patient Declined (12/14/2023)    Hunger Vital Sign     Worried About Running Out of Food in the Last Year: Patient declined     Ran Out of Food in the Last Year: Patient declined   Transportation Needs: Patient Declined (12/14/2023)    PRAPARE - Transportation     Lack of Transportation (Medical): Patient declined     Lack of Transportation (Non-Medical): Patient declined   Physical Activity: Unknown (12/14/2023)    Exercise Vital Sign     Days of Exercise per Week: Patient declined     Minutes of Exercise per Session: 30 min   Stress: Patient Declined (12/14/2023)    [employer] - Occupational Stress Questionnaire     Feeling of Stress : Patient declined   Social Connections: Unknown (12/14/2023)    Social Connection and  Isolation Panel [NHANES]     Frequency of Communication with Friends and Family: Patient declined     Frequency of Social Gatherings with Friends and Family: Patient declined     Active Member of Clubs or Organizations: Patient declined     Attends Club or Organization Meetings: Patient declined     Marital Status:    Housing Stability: Unknown (12/14/2023)    Housing Stability Vital Sign     Unable to Pay for Housing in the Last Year: Patient refused     Unstable Housing in the Last Year: Patient refused       Review of patient's allergies indicates:  No Known Allergies      Current Facility-Administered Medications:     ceFAZolin 2 g in dextrose 5 % in water (D5W) 50 mL IVPB (MB+), 2 g, Intravenous, On Call Procedure, Martin Vo MD    electrolyte-S (ISOLYTE), , Intravenous, Continuous, Martin Vo MD, Last Rate: 50 mL/hr at 04/04/24 0627, New Bag at 04/04/24 0627    LIDOcaine (PF) 10 mg/ml (1%) injection 10 mg, 1 mL, Intradermal, Once, Genaro Flores MD    sodium chloride 0.9% 49.3 mL with ROPIvacaine 0.5% (PF) (NAROPIN) 49.3 mL, ketorolac (TORADOL) 30 mg, EPINEPHrine (ADRENALINE) 0.5 mg injection, , Other, Once, Martin Vo MD    tranexamic acid (CYKLOKAPRON) 1,000 mg in sodium chloride 0.9 % 100 mL IVPB (MB+), 1,000 mg, Intravenous, On Call Procedure, Martin Vo MD    ROS:    Denies chest pain or palpitations  Denies shortness of breath  Denies fevers or chills  Denies chest pain  Denies abdominal pain    PE:    General Appearance: Well nourished  Orientation: Oriented to time, place, person  Mental Status: Alert  Heart: RRR  Lungs: CTA  Abdomen: Soft and non-tender    Anesthesia/Surgery risks, benefits and alternative options discussed and understood by patient/family.    This note was created using Dragon voice recognition software that occasionally misinterpreted phrases or words.

## 2024-04-04 NOTE — ANESTHESIA PROCEDURE NOTES
Spinal    Diagnosis: arthritis  Patient location during procedure: OR  Start time: 4/4/2024 7:03 AM  Timeout: 4/4/2024 7:02 AM  End time: 4/4/2024 7:06 AM    Staffing  Authorizing Provider: Ronny Larry MD  Performing Provider: Ronny Larry MD    Staffing  Performed by: Ronny Larry MD  Authorized by: Ronny Larry MD    Preanesthetic Checklist  Completed: patient identified, IV checked, site marked, risks and benefits discussed, surgical consent, monitors and equipment checked, pre-op evaluation and timeout performed  Spinal Block  Patient position: sitting  Prep: ChloraPrep  Patient monitoring: heart rate, cardiac monitor and continuous pulse ox  Approach: midline  Injection technique: single shot  CSF Fluid: clear free-flowing CSF  Needle  Needle type: pencil-tip   Needle gauge: 25 G  Needle length: 3.5 in  Additional Documentation: incremental injection, negative aspiration for heme and no paresthesia on injection  Needle localization: anatomical landmarks  Assessment  Ease of block: easy  Patient's tolerance of the procedure: no complaints and comfortable throughout block  Additional Notes  3ml 1.5% mepivicaine used for spinal  Medications:    Medications: mepivacaine (CARBOCAINE) injection 20 mg/mL (2%) - Intrathecal   2.5 mL - 4/4/2024 7:06:00 AM

## 2024-04-04 NOTE — ANESTHESIA PROCEDURE NOTES
Adductor SS    Patient location during procedure: pre-op   Block not for primary anesthetic.  Reason for block: at surgeon's request and post-op pain management   Post-op Pain Location: left knee   Start time: 4/4/2024 6:43 AM  Timeout: 4/4/2024 6:42 AM   End time: 4/4/2024 6:45 AM    Staffing  Authorizing Provider: Ronny Larry MD  Performing Provider: Ronny Larry MD    Staffing  Performed by: Ronny Larry MD  Authorized by: Ronny Larry MD    Preanesthetic Checklist  Completed: patient identified, IV checked, site marked, risks and benefits discussed, surgical consent, monitors and equipment checked, pre-op evaluation and timeout performed  Peripheral Block  Patient position: supine  Prep: ChloraPrep  Patient monitoring: heart rate, cardiac monitor, continuous pulse ox, continuous capnometry and frequent blood pressure checks  Block type: adductor canal  Laterality: left  Injection technique: single shot  Needle  Needle type: Stimuplex   Needle gauge: 21 G  Needle length: 4 in  Needle localization: anatomical landmarks and ultrasound guidance   -ultrasound image captured on disc.  Assessment  Injection assessment: negative aspiration, negative parasthesia and local visualized surrounding nerve  Paresthesia pain: none  Heart rate change: no  Slow fractionated injection: yes  Pain Tolerance: comfortable throughout block and no complaints  Medications:    Medications: bupivacaine (pf) (MARCAINE) injection 0.5% - Perineural   10 mL - 4/4/2024 6:45:00 AM    Additional Notes  VSS.  [facility] RN monitoring vitals throughout procedure.  Patient tolerated procedure well.

## 2024-04-04 NOTE — OP NOTE
[facility]  Orthopedic  Operative Note    SUMMARY     Date of Procedure: 4/4/2024     Procedure: Procedure(s) (LRB):  ROBOTIC ARTHROPLASTY, KNEE, TOTAL, LEFT (Left)       Surgeon(s) and Role:     * Martin Vo MD - Primary    Assisting Surgeon:  Titus Perez    Pre-Operative Diagnosis: Primary osteoarthritis of left knee [M17.12]    Post-Operative Diagnosis: Post-Op Diagnosis Codes:     * Primary osteoarthritis of left knee [M17.12]    Anesthesia: Spinal    Procedure in General:  The patient was brought to the operating room while supine a tourniquet was placed on the left leg after the patient was administered a spinal anesthetic.  The distal thigh and left lower extremity was cleansed with alcohol and prepped with ChloraPrep solution and draped in the usual fashion for robotic knee surgery.  A time-out was performed the leg was exsanguinated the tourniquet was inflated.  The leg pathak was placed and an anterior arthrotomy incision was made with the patella everted and knee flexed.  We then inserted the robotic pins into the mid femur and proximal tibia along with the robotic array.  Reference pins were placed in the femur and the proximal tibia.  The knee was brought through range of motion and registered in routine fashion on the proximal tibia and distal femur.  The robot was brought onto the field.  We made our distal posterior chamfer and anterior cuts using robotic assistance.  The knee was flexed soft tissue released were done on the proximal tibia to remove the menisci and then the proximal tibial cut was made used to using robotic assistance protecting the tendon.  Further flexion of the knee then allowed us to debride soft tissue in the tibia was sized.  The trial base plate and guide was placed on the tibia with the proximal chamfer cut into the tibia.  Trial components were inserted the femur tibia and a none mm polyethylene insert the knee was brought through a  stable range of motion it had approximately 0° of extension and was stable at in full extension and with knee flexion.  The articular surface of the patella was removed the patella sized.  All trial components were removed the knee was thoroughly irrigated with pulsatile lavage as well as a dilute solution of Betadine.  The tibia and patella were dried methylmethacrylate was prepared and the tibial base plate cemented into place along with the all polyethylene patellar component.  The 9 mm polyethylene spacer was inserted and the porous-coated noncemented femoral component was impacted on the distal femur.  The patella was then cemented into place and after the cement had cured the knee again was checked for stability and it was stable.  Tourniquet was deflated hemostasis obtained as necessary we thoroughly irrigated the knee again with pulsatile lavage and dilute Betadine solution and then closed the wound in layers using a combination of absorbable and nonabsorbable sutures.  No drain was used.  Staples used on the skin followed by sterile dressings.  The patient was brought to recovery room in stable condition            Complications: None    Estimated Blood Loss (EBL):            Implants:   Implant Name Type Inv. Item Serial No.  Lot No. LRB No. Used Action   CEMENT BONE SIMPLEX HV RADPQ - [id number]  CEMENT BONE SIMPLEX HV RADPQ  [employer]. [id number] Left 1 Implanted   PIN BONE 3.5S783UO - [id number]  PIN BONE [id number]  [employer]. [id number] Left 1 Implanted   PIN FIXATION BONE 140X3.2MM - [id number]  PIN FIXATION BONE 140X3.2MM  [employer]. [id number] Left 1 Implanted   COMP FEM CR BEAD SZ 5 LEFT - [id number]  COMP FEM CR BEAD SZ 5 LEFT  [employer]. [id number] Left 1 Implanted   PATELLA TRI 33X9 X3 POLYETHYLE - [id number]  PATELLA TRI 33X9 X3 POLYETHYLE  [employer]. [id number] Left 1 Implanted   INSERT TIBIAL SZ 5 9MMX3 - [id number]  INSERT TIBIAL SZ 5 9MMX3  [employer]. [id number] Left 1 Implanted   PRIMARY TIBIAL BASE 5. - [id number]  PRIMARY TIBIAL BASE 5.  [employer] [facility]. [id number] Left 1 Implanted       Specimens:   Specimen (24h ago, onward)      None                    Condition: Good    Disposition: PACU - hemodynamically stable.    Attestation: I was present and scrubbed for the entire procedure.

## 2024-04-04 NOTE — PLAN OF CARE
Patient awake, alert, oriented. Vital signs stable. Cleared for discharge by PT. Pt has all necessary DME. Pain well controlled. Patient denies nausea, tolerating clear liquids. Patient expressing readiness for discharge. Discharge instructions reviewed with patient and support person. Per MD Laron, pt to resume blood pressure medications upon returning home today. Per MD Gilda, pt to begin Aspirin 81mg BID for one month beginning tonight; pt to purchase OTC. Patient and support person verbalized understanding. Dressing clean, dry, and intact. Belongings returned to patient. PIV removed intact. Patient transferred to car via wheelchair.

## 2024-04-04 NOTE — PLAN OF CARE
Pt noticed dressing not fully covering incision; proximal few vianey exposed. MD Gilda notified; instructed by MD to re-wrap dressing. Dressing re-wrapped per MD order. Pt tolerated re-wrapping well.

## 2024-04-04 NOTE — PLAN OF CARE
1145--MD Laron notified of pt's elevated blood pressures. Pt did not take home antihypertensives this morning. MD Laron to order home meds now.

## 2024-04-04 NOTE — ANESTHESIA POSTPROCEDURE EVALUATION
Anesthesia Post Evaluation    Patient: Trino Head    Procedure(s) Performed: Procedure(s) (LRB):  ROBOTIC ARTHROPLASTY, KNEE, TOTAL, LEFT (Left)    Final Anesthesia Type: general      Patient location during evaluation: PACU  Patient participation: Yes- Able to Participate  Level of consciousness: awake and alert and oriented  Post-procedure vital signs: reviewed and stable  Pain management: adequate  Airway patency: patent  DIVYA mitigation strategies: Use of major conduction anesthesia (spinal/epidural) or peripheral nerve block and Multimodal analgesia  PONV status at discharge: No PONV  Anesthetic complications: no      Cardiovascular status: blood pressure returned to baseline and hypertensive  Respiratory status: unassisted, spontaneous ventilation and room air  Hydration status: euvolemic  Follow-up not needed.              Vitals Value Taken Time   /88 04/04/24 1108   Temp 36.4 °C (97.5 °F) 04/04/24 0905   Pulse 54 04/04/24 1108   Resp 18 04/04/24 1108   SpO2 98 % 04/04/24 1108         Event Time   Out of Recovery 10:33:00         Pain/Jossy Score: Pain Rating Prior to Med Admin: 2 (4/4/2024 10:09 AM)  Jossy Score: 10 (4/4/2024 10:30 AM)  Modified Jossy Score: 19 (4/4/2024 10:33 AM)      Missed both BP medications this am.  Just re-ordered. Pt to receive prior to discharge

## 2024-04-04 NOTE — TRANSFER OF CARE
"Anesthesia Transfer of Care Note    Patient: Trino Head    Procedure(s) Performed: Procedure(s) (LRB):  ROBOTIC ARTHROPLASTY, KNEE, TOTAL, LEFT (Left)    Patient location: PACU    Anesthesia Type: spinal    Transport from OR: Transported from OR on 2-3 L/min O2 by NC with adequate spontaneous ventilation    Post pain: adequate analgesia    Post assessment: no apparent anesthetic complications and tolerated procedure well    Post vital signs: stable    Level of consciousness: awake, alert and oriented    Complications: none    Transfer of care protocol was followed      Last vitals: Visit Vitals  BP (!) 170/75 (BP Location: Left arm, Patient Position: Lying)   Pulse (!) 55   Temp 36.8 °C (98.2 °F) (Skin)   Resp 16   Ht 5' 8" (1.727 m)   Wt 108.4 kg (238 lb 15.7 oz)   SpO2 98%   BMI 36.34 kg/m²     "

## 2024-04-04 NOTE — BRIEF OP NOTE
[facility]  Brief Operative Note    Surgery Date: 4/4/2024     Surgeon(s) and Role:     * Martin Vo MD - Primary    Assisting Surgeon:  Titus Perez    Pre-op Diagnosis:  Primary osteoarthritis of left knee [M17.12]    Post-op Diagnosis:  Post-Op Diagnosis Codes:     * Primary osteoarthritis of left knee [M17.12]    Procedure(s) (LRB):  ROBOTIC ARTHROPLASTY, KNEE, TOTAL, LEFT (Left)    Anesthesia: Spinal    Operative Findings:  Tricompartmental osteoarthritis left knee    Estimated Blood Loss: * No values recorded between 4/4/2024  7:25 AM and 4/4/2024  8:55 AM *         Specimens:   Specimen (24h ago, onward)      None              Discharge Note    OUTCOME: Patient tolerated treatment/procedure well without complication and is now ready for discharge.    DISPOSITION: Home or Self Care    FINAL DIAGNOSIS:  Osteoarthritis left knee    FOLLOWUP: In clinic    DISCHARGE INSTRUCTIONS:    Per outpatient total knee protocol

## 2024-04-05 ENCOUNTER — OFFICE VISIT (OUTPATIENT)
Dept: ORTHOPEDICS | Facility: CLINIC | Age: 59
End: 2024-04-05
Payer: COMMERCIAL

## 2024-04-05 VITALS
HEIGHT: 68 IN | BODY MASS INDEX: 36.22 KG/M2 | SYSTOLIC BLOOD PRESSURE: 122 MMHG | WEIGHT: 239 LBS | DIASTOLIC BLOOD PRESSURE: 74 MMHG

## 2024-04-05 DIAGNOSIS — Z96.652 S/P TOTAL KNEE ARTHROPLASTY, LEFT: Primary | ICD-10-CM

## 2024-04-05 PROCEDURE — 3078F DIAST BP <80 MM HG: CPT | Mod: CPTII,S$GLB,, | Performed by: PHYSICIAN ASSISTANT

## 2024-04-05 PROCEDURE — 1159F MED LIST DOCD IN RCRD: CPT | Mod: CPTII,S$GLB,, | Performed by: PHYSICIAN ASSISTANT

## 2024-04-05 PROCEDURE — 99024 POSTOP FOLLOW-UP VISIT: CPT | Mod: S$GLB,,, | Performed by: PHYSICIAN ASSISTANT

## 2024-04-05 PROCEDURE — 4010F ACE/ARB THERAPY RXD/TAKEN: CPT | Mod: CPTII,S$GLB,, | Performed by: PHYSICIAN ASSISTANT

## 2024-04-05 PROCEDURE — 3074F SYST BP LT 130 MM HG: CPT | Mod: CPTII,S$GLB,, | Performed by: PHYSICIAN ASSISTANT

## 2024-04-05 PROCEDURE — 1160F RVW MEDS BY RX/DR IN RCRD: CPT | Mod: CPTII,S$GLB,, | Performed by: PHYSICIAN ASSISTANT

## 2024-04-05 PROCEDURE — G0180 MD CERTIFICATION HHA PATIENT: HCPCS | Mod: ,,, | Performed by: ORTHOPAEDIC SURGERY

## 2024-04-05 RX ORDER — IBUPROFEN 800 MG/1
800 TABLET ORAL 3 TIMES DAILY
Qty: 90 TABLET | Refills: 2 | Status: SHIPPED | OUTPATIENT
Start: 2024-04-05

## 2024-04-05 NOTE — PROGRESS NOTES
Subjective:    Patient ID: Trino Head is a 59 y.o. male.    Chief Complaint: Post-op Evaluation of the Left Knee (POD 1 left TKA 4.4.24 current pain)      History of Present Illness    Prior to meeting with the patient I reviewed the medical chart in [facility]. This included reviewing the previous progress notes from our office, review of the patient's last appointment with their primary care provider, review of any visits to the emergency room, and review of any pain management appointments or procedures.  Postop day 1 status post left total knee arthroplasty.  Overall doing well with expected postoperative pain.    Current Medications  Current Outpatient Medications   Medication Sig Dispense Refill    ascorbic acid, vitamin C, (VITAMIN C) 1000 MG tablet Take 1,000 mg by mouth once daily.      aspirin (ECOTRIN) 81 MG EC tablet Take 81 mg by mouth 2 (two) times a day. For one month post-op      atorvastatin (LIPITOR) 20 MG tablet Take 1 tablet (20 mg total) by mouth once daily. (Patient taking differently: Take 20 mg by mouth once daily. 40MG EVERY OTHER DAY OR 20MG DAILY) 90 tablet 3    cholecalciferol, vitamin D3, (VITAMIN D3) 25 mcg (1,000 unit) capsule Take 1,000 Units by mouth once daily.      cyanocobalamin (VITAMIN B-12) 1000 MCG tablet Take 100 mcg by mouth once daily.      flaxseed oil 1,000 mg Cap Take by mouth.      ginkgo biloba 60 mg Cap Take by mouth.      gluc hull/chondro hull A/vit C/Mn (GLUCOSAMINE 1500 COMPLEX ORAL) Take by mouth.      losartan (COZAAR) 50 MG tablet Take 1 tablet (50 mg total) by mouth once daily. 90 tablet 3    meloxicam (MOBIC) 15 MG tablet Take 1 tablet (15 mg total) by mouth daily as needed for Pain. 90 tablet 2    multivit-min/FA/lycopen/lutein (CENTRUM SILVER MEN ORAL) Take by mouth.      omega 3-dha-epa-fish oil 1,000 mg (120 mg-180 mg) Cap Take by mouth.      omeprazole (PRILOSEC) 40 MG capsule Take 1 capsule (40 mg total) by mouth once daily. 90 capsule 3     oxyCODONE-acetaminophen (PERCOCET)  mg per tablet Take 1 tablet by mouth every 4 (four) hours as needed for Pain. 42 tablet 0    propranoloL (INDERAL) 40 MG tablet Take 2 tablets (80 mg total) by mouth once daily. 180 tablet 3    TURMERIC ORAL Take by mouth.      vitamin E 400 UNIT capsule Take 400 Units by mouth once daily.       No current facility-administered medications for this visit.       Allergies  Review of patient's allergies indicates:  No Known Allergies    Past Medical History  Past Medical History:   Diagnosis Date    Arthritis     Digestive disorder     Hypercholesteremia     Hypertension     Renal disorder     kidney stone       Surgical History  Past Surgical History:   Procedure Laterality Date    COLONOSCOPY  2016    Dr. Chauhan-RTBENITO 10 years    VASECTOMY  2012       Family History:   History reviewed. No pertinent family history.    Social History:   Social History     Socioeconomic History    Marital status:    Tobacco Use    Smoking status: Never    Smokeless tobacco: Never   Substance and Sexual Activity    Alcohol use: Yes     Alcohol/week: 1.0 standard drink of alcohol     Types: 1 Cans of beer per week     Comment: socially    Drug use: Never    Sexual activity: Yes     Partners: Female     Birth control/protection: See Surgical Hx     Social Determinants of Health     Financial Resource Strain: Patient Declined (12/14/2023)    Overall Financial Resource Strain (CARDIA)     Difficulty of Paying Living Expenses: Patient declined   Food Insecurity: Patient Declined (12/14/2023)    Hunger Vital Sign     Worried About Running Out of Food in the Last Year: Patient declined     Ran Out of Food in the Last Year: Patient declined   Transportation Needs: Patient Declined (12/14/2023)    PRAPARE - Transportation     Lack of Transportation (Medical): Patient declined     Lack of Transportation (Non-Medical): Patient declined   Physical Activity: Unknown (12/14/2023)    Exercise Vital  Sign     Days of Exercise per Week: Patient declined     Minutes of Exercise per Session: 30 min   Stress: Patient Declined (12/14/2023)    [employer] [place] [employer] - Occupational Stress Questionnaire     Feeling of Stress : Patient declined   Social Connections: Unknown (12/14/2023)    Social Connection and Isolation Panel [NHANES]     Frequency of Communication with Friends and Family: Patient declined     Frequency of Social Gatherings with Friends and Family: Patient declined     Active Member of Clubs or Organizations: Patient declined     Attends Club or Organization Meetings: Patient declined     Marital Status:    Housing Stability: Unknown (12/14/2023)    Housing Stability Vital Sign     Unable to Pay for Housing in the Last Year: Patient refused     Unstable Housing in the Last Year: Patient refused       Date of surgery:  04/04/2024    Review of Systems     General/Constitutional: Chills denies. Fatigue denies. Fever denies. Weight gain denies. Weight loss denies.    Musculoskeletal: Comments: See HPI for details    Skin: Rash denies.    Objective:   Vital Signs:   Vitals:    04/05/24 0908   BP: 122/74        Physical Exam    This a well-developed, well nourished patient in no acute distress.  They are alert and oriented and cooperative to examination.  Pt. walks without an antalgic gait.      General Examination:     Constitutional: The patient is alert and oriented to lace person and time. Mood is pleasant.     Head/Face: Normal facial features normal eyebrows    Eyes: Normal extraocular motion bilaterally    Lungs: Respirations are equal and unlabored    Gait is coordinated.    Cardiovascular: There are no swelling or varicosities present.    Lymphatic: Negative for adenopathy    Skin: Normal    Neurological: Level of consciousness normal. Oriented to place person and time and situation    Psychiatric: Oriented to time place person and situation    Expected antalgic gait with a  rolling walker.  The incision looks good with some mild bloody drainage from the most inferior aspect.  The leg was cleaned up with peroxide and gauze and redressed with a bulky compression dressing.  Left lower extremities distal neurovascular intact.    XRAY Report/ Interpretation:  Postop left knee x-rays done at the hospital yesterday demonstrate a normal total knee arthroplasty without any complications      Assessment:       1. S/P total knee arthroplasty, left        Plan:       Trino was seen today for post-op evaluation.    Diagnoses and all orders for this visit:    S/P total knee arthroplasty, left         Follow up for Post-Op keep scheduled post op appt.    Continue home health physical therapy to maximize functional mobility, gait, and range of motion.  Continue with pain medications as prescribed.  Prescription for ibuprofen 800 mg with food every 8 hours as needed for breakthrough pain.  Follow-up in 2 weeks or sooner if needed.      Treatment options were discussed with regards to the nature of the medical condition. Conservative pain intervention and surgical options were discussed in detail. The probability of success of each separate treatment option was discussed. The patient expressed a clear understanding of the treatment options. With regards to surgery, the procedure risk, benefits, complications, and outcomes were discussed. No guarantees were given with regards to surgical outcome.   The risk of complications, morbidity, and mortality of patient management decisions have been made at the time of this visit. These are associated with the patient's problems, diagnostic procedures and treatment options. This includes the possible management options selected and those considered but not selected by the patient after shared medical decision making we discussed with the patient.     This note was created using Dragon voice recognition software that occasionally misinterpreted phrases or  words.

## 2024-04-10 ENCOUNTER — HOSPITAL ENCOUNTER (EMERGENCY)
Facility: HOSPITAL | Age: 59
Discharge: HOME | End: 2024-04-10
Attending: STUDENT IN AN ORGANIZED HEALTH CARE EDUCATION/TRAINING PROGRAM
Payer: COMMERCIAL

## 2024-04-10 VITALS
TEMPERATURE: 98 F | DIASTOLIC BLOOD PRESSURE: 73 MMHG | RESPIRATION RATE: 18 BRPM | SYSTOLIC BLOOD PRESSURE: 128 MMHG | HEART RATE: 58 BPM | HEIGHT: 70 IN | BODY MASS INDEX: 33.64 KG/M2 | WEIGHT: 235 LBS | OXYGEN SATURATION: 95 %

## 2024-04-10 DIAGNOSIS — M79.89 PAIN AND SWELLING OF LEFT LOWER EXTREMITY: ICD-10-CM

## 2024-04-10 DIAGNOSIS — G89.18 POST-OPERATIVE PAIN: Primary | ICD-10-CM

## 2024-04-10 DIAGNOSIS — M79.605 PAIN AND SWELLING OF LEFT LOWER EXTREMITY: ICD-10-CM

## 2024-04-10 LAB
ALBUMIN SERPL BCP-MCNC: 2.9 G/DL (ref 3.5–5.2)
ALP SERPL-CCNC: 57 U/L (ref 55–135)
ALT SERPL W/O P-5'-P-CCNC: 25 U/L (ref 10–44)
ANION GAP SERPL CALC-SCNC: 8 MMOL/L (ref 8–16)
AST SERPL-CCNC: 21 U/L (ref 10–40)
BASOPHILS # BLD AUTO: 0.04 K/UL (ref 0–0.2)
BASOPHILS NFR BLD: 0.6 % (ref 0–1.9)
BILIRUB SERPL-MCNC: 0.5 MG/DL (ref 0.1–1)
BUN SERPL-MCNC: 22 MG/DL (ref 6–20)
CALCIUM SERPL-MCNC: 9.6 MG/DL (ref 8.7–10.5)
CHLORIDE SERPL-SCNC: 104 MMOL/L (ref 95–110)
CO2 SERPL-SCNC: 27 MMOL/L (ref 23–29)
CREAT SERPL-MCNC: 1.1 MG/DL (ref 0.5–1.4)
DIFFERENTIAL METHOD BLD: ABNORMAL
EOSINOPHIL # BLD AUTO: 0.1 K/UL (ref 0–0.5)
EOSINOPHIL NFR BLD: 1.5 % (ref 0–8)
ERYTHROCYTE [DISTWIDTH] IN BLOOD BY AUTOMATED COUNT: 13 % (ref 11.5–14.5)
EST. GFR  (NO RACE VARIABLE): >60 ML/MIN/1.73 M^2
GLUCOSE SERPL-MCNC: 110 MG/DL (ref 70–110)
HCT VFR BLD AUTO: 32.6 % (ref 40–54)
HGB BLD-MCNC: 10.3 G/DL (ref 14–18)
IMM GRANULOCYTES # BLD AUTO: 0.03 K/UL (ref 0–0.04)
IMM GRANULOCYTES NFR BLD AUTO: 0.4 % (ref 0–0.5)
LYMPHOCYTES # BLD AUTO: 1.6 K/UL (ref 1–4.8)
LYMPHOCYTES NFR BLD: 23.2 % (ref 18–48)
MCH RBC QN AUTO: 31.2 PG (ref 27–31)
MCHC RBC AUTO-ENTMCNC: 31.6 G/DL (ref 32–36)
MCV RBC AUTO: 99 FL (ref 82–98)
MONOCYTES # BLD AUTO: 0.8 K/UL (ref 0.3–1)
MONOCYTES NFR BLD: 11.6 % (ref 4–15)
NEUTROPHILS # BLD AUTO: 4.3 K/UL (ref 1.8–7.7)
NEUTROPHILS NFR BLD: 62.7 % (ref 38–73)
NRBC BLD-RTO: 0 /100 WBC
PLATELET # BLD AUTO: 322 K/UL (ref 150–450)
PMV BLD AUTO: 9.5 FL (ref 9.2–12.9)
POTASSIUM SERPL-SCNC: 4.7 MMOL/L (ref 3.5–5.1)
PROT SERPL-MCNC: 6.2 G/DL (ref 6–8.4)
RBC # BLD AUTO: 3.3 M/UL (ref 4.6–6.2)
SODIUM SERPL-SCNC: 139 MMOL/L (ref 136–145)
WBC # BLD AUTO: 6.81 K/UL (ref 3.9–12.7)

## 2024-04-10 PROCEDURE — 85025 COMPLETE CBC W/AUTO DIFF WBC: CPT | Performed by: STUDENT IN AN ORGANIZED HEALTH CARE EDUCATION/TRAINING PROGRAM

## 2024-04-10 PROCEDURE — 36415 COLL VENOUS BLD VENIPUNCTURE: CPT | Performed by: STUDENT IN AN ORGANIZED HEALTH CARE EDUCATION/TRAINING PROGRAM

## 2024-04-10 PROCEDURE — 80053 COMPREHEN METABOLIC PANEL: CPT | Performed by: STUDENT IN AN ORGANIZED HEALTH CARE EDUCATION/TRAINING PROGRAM

## 2024-04-10 PROCEDURE — 99285 EMERGENCY DEPT VISIT HI MDM: CPT | Mod: 25

## 2024-04-10 NOTE — ED PROVIDER NOTES
Encounter Date: 4/10/2024       History     Chief Complaint   Patient presents with    L knee pain     Knee replacement 4/04. Pt c/o increased pain and swelling to knee since PT session     HPI    59-year-old gentleman presenting to the emergency department for evaluation of left knee pain.  Patient underwent knee replacement, robotic arthroplasty on 04/04/2024 by Dr. Vo.  Patient has been in physical therapy afterwards.  Two days ago he noted increased swelling and pain to the knee.  He notes it has worsened until today and there is also ecchymosis noted to the knee.  He denies drainage from the incision at this time.  She denies fever, chills, abdominal pain, chest pain, shortness for breath, history of DVT, dysuria, hematuria, nausea, vomiting, other systemic symptoms.  Denies any new trauma.  No other mitigating or exacerbating factors.  Review of patient's allergies indicates:  No Known Allergies  Past Medical History:   Diagnosis Date    Arthritis     Digestive disorder     Hypercholesteremia     Hypertension     Renal disorder     kidney stone     Past Surgical History:   Procedure Laterality Date    COLONOSCOPY  2016    Dr. Chauhan-RT 10 years    ROBOTIC ARTHROPLASTY, KNEE Left 4/4/2024    Procedure: ROBOTIC ARTHROPLASTY, KNEE, TOTAL, LEFT;  Surgeon: Martin Vo MD;  Location: [facility];  Service: Orthopedics;  Laterality: Left;  [place]-Nithin    VASECTOMY  2012     No family history on file.  Social History     Tobacco Use    Smoking status: Never    Smokeless tobacco: Never   Substance Use Topics    Alcohol use: Yes     Alcohol/week: 1.0 standard drink of alcohol     Types: 1 Cans of beer per week     Comment: socially    Drug use: Never     Review of Systems  See HPI  Physical Exam     Initial Vitals [04/10/24 0357]   BP Pulse Resp Temp SpO2   (!) 170/76 69 18 98 °F (36.7 °C) 99 %      MAP       --         Physical Exam    Nursing note and vitals reviewed.  Constitutional: He appears  well-developed and well-nourished. He is not diaphoretic. No distress.   HENT:   Head: Normocephalic and atraumatic.   Right Ear: External ear normal.   Left Ear: External ear normal.   Nose: Nose normal.   Eyes: Conjunctivae are normal. No scleral icterus.   Neck: Neck supple. No tracheal deviation present.   Cardiovascular:  Normal rate, regular rhythm, normal heart sounds and intact distal pulses.     Exam reveals no gallop and no friction rub.       No murmur heard.  Pulmonary/Chest: Breath sounds normal. No respiratory distress.   Abdominal: Abdomen is soft. Bowel sounds are normal. There is no abdominal tenderness.   Musculoskeletal:      Cervical back: Neck supple.      Comments: Edema noted to left extremity, 2+ pitting edema.  There is ecchymosis noted near incision wounds.  Incisions are clean, dry, intact.  No drainage from wounds at this time.  2+ DP pulses bilaterally.     Neurological: He is alert and oriented to person, place, and time. GCS score is 15.   Patient notes sensation intact distally left foot.   Skin: Skin is warm and dry.   Psychiatric: He has a normal mood and affect. His behavior is normal. Thought content normal.         ED Course   Procedures  Labs Reviewed   CBC W/ AUTO DIFFERENTIAL - Abnormal; Notable for the following components:       Result Value    RBC 3.30 (*)     Hemoglobin 10.3 (*)     Hematocrit 32.6 (*)     MCV 99 (*)     MCH 31.2 (*)     MCHC 31.6 (*)     All other components within normal limits   COMPREHENSIVE METABOLIC PANEL - Abnormal; Notable for the following components:    BUN 22 (*)     Albumin 2.9 (*)     All other components within normal limits          Imaging Results              US Lower Extremity Veins Left (In process)                      X-Ray Knee 1 or 2 View Left (Preliminary result)  Result time 04/10/24 05:55:11      Wet Read by Immanuel Story MD (04/10/24 05:55:11, [facility], Emergency Medicine)    No acute fracture dislocation  noted.                                     Medications - No data to display  Medical Decision Making  Amount and/or Complexity of Data Reviewed  Labs: ordered. Decision-making details documented in ED Course.  Radiology: ordered and independent interpretation performed. Decision-making details documented in ED Course.               ED Course as of 04/10/24 0610   Wed Apr 10, 2024   0519 WBC: 6.81 [CC]   0545 US Lower Extremity Veins Left  FINDINGS: Left deep veins: Unremarkable. The common femoral, femoral, proximal profunda femoral and popliteal veins are patent without thrombus. Normal Doppler waveforms. Normal compressibility and/or augmentation response. Superficial veins: Greater saphenous vein at the saphenofemoral junction is patent without thrombus. Soft tissues: Unremarkable. IMPRESSION: No evidence of deep vein thrombosis   [CC]   0603 Differential diagnoses considered but not limited to postoperative pain, DVT, cellulitis, postop infection, dependent edema  [CC]   0605 Patient presents with left lower leg swelling and pain after recent surgery.  Ultrasound is not indicative of DVT.  Patient was on aspirin twice daily.  Counseled on nicotine you.  Patient is afebrile, does not appear toxic or septic.  Hypertensive in the setting of pain.  No chest pain, shortness for breath, other symptoms concerning for hypertensive emergency.  Doubt hypertensive emergency.  Hemoglobin hematocrit decreased in the postoperative setting.  No indication for transfusion at this time.  Platelet count is normal.  Patient is afebrile, no leukocytosis.  No increased warmth Charleen, no drainage from incisional wounds, I believe infection is less likely.  I have counseled the patient follow up with his doctor as soon as you can for further evaluation regarding the pain and swelling in the left leg.  Patient's kidney function within normal limits.  Electrolytes within normal limits, doubt derangement.  Plan for discharge.  Strict  return precautions given. I discussed with the patient/family the diagnosis, treatment plan, indications for return to the emergency department, and for expected follow-up. The patient/family verbalized an understanding. The patient/family  asked if there are any questions or concerns. We discuss the case, until all issues are addressed to the patient/family's satisfaction. Patient/family understands and is agreeable to the plan. Patient is stable and ready for discharge.   [CC]      ED Course User Index  [CC] Immanuel Story MD                           Clinical Impression:  Final diagnoses:  [M79.605, M79.89] Pain and swelling of left lower extremity  [G89.18] Post-operative pain (Primary)          ED Disposition Condition    Discharge Stable          ED Prescriptions    None       Follow-up Information       Follow up With Specialties Details Why Contact Info Additional Information    Titus Graham MD Family Medicine, Home Health Services, Hospice Services Schedule an appointment as soon as possible for a visit in 2 days  [address] [facility]  [address] [place] [id number]  [phone]       [facility] Emergency Medicine Go to  If symptoms worsen [address] [place] [address] [address] [place] [id number] 1st floor             Immanuel Story MD  04/10/24 0772

## 2024-04-11 ENCOUNTER — PATIENT MESSAGE (OUTPATIENT)
Dept: ORTHOPEDICS | Facility: CLINIC | Age: 59
End: 2024-04-11

## 2024-04-11 DIAGNOSIS — Z96.652 S/P TOTAL KNEE ARTHROPLASTY, LEFT: Primary | ICD-10-CM

## 2024-04-11 RX ORDER — OXYCODONE AND ACETAMINOPHEN 7.5; 325 MG/1; MG/1
1 TABLET ORAL EVERY 6 HOURS PRN
Qty: 28 TABLET | Refills: 0 | Status: SHIPPED | OUTPATIENT
Start: 2024-04-11 | End: 2024-05-29

## 2024-04-11 RX ORDER — OXYCODONE AND ACETAMINOPHEN 10; 325 MG/1; MG/1
1 TABLET ORAL EVERY 4 HOURS PRN
Qty: 42 TABLET | Refills: 0 | Status: CANCELLED | OUTPATIENT
Start: 2024-04-11 | End: 2024-04-18

## 2024-04-19 ENCOUNTER — OFFICE VISIT (OUTPATIENT)
Dept: ORTHOPEDICS | Facility: CLINIC | Age: 59
End: 2024-04-19
Payer: COMMERCIAL

## 2024-04-19 VITALS — BODY MASS INDEX: 33.64 KG/M2 | HEIGHT: 70 IN | WEIGHT: 235 LBS

## 2024-04-19 DIAGNOSIS — Z96.652 S/P TOTAL KNEE ARTHROPLASTY, LEFT: Primary | ICD-10-CM

## 2024-04-19 PROCEDURE — 99024 POSTOP FOLLOW-UP VISIT: CPT | Mod: S$GLB,,, | Performed by: PHYSICIAN ASSISTANT

## 2024-04-19 PROCEDURE — 1159F MED LIST DOCD IN RCRD: CPT | Mod: CPTII,S$GLB,, | Performed by: PHYSICIAN ASSISTANT

## 2024-04-19 PROCEDURE — 4010F ACE/ARB THERAPY RXD/TAKEN: CPT | Mod: CPTII,S$GLB,, | Performed by: PHYSICIAN ASSISTANT

## 2024-04-19 PROCEDURE — 1160F RVW MEDS BY RX/DR IN RCRD: CPT | Mod: CPTII,S$GLB,, | Performed by: PHYSICIAN ASSISTANT

## 2024-04-19 RX ORDER — GABAPENTIN 300 MG/1
300 CAPSULE ORAL 2 TIMES DAILY
Qty: 60 CAPSULE | Refills: 0 | Status: SHIPPED | OUTPATIENT
Start: 2024-04-19 | End: 2024-05-29

## 2024-04-19 NOTE — PROGRESS NOTES
[facility] ORTHOPEDICS  [address]  [place] [address]  Phone: [phone]   [phone]    Patient's PCP:Titus Graham MD  Referring Provider: No ref. provider found    POST-OP Note:    Subjective:        Chief Complaint:   Chief Complaint   Patient presents with    Left Knee - Post-op Evaluation     S/p Left TKA 4/4/24, getting restless leg syndrome at night, as of yesterday having little medial pain,        Past Medical History:   Diagnosis Date    Arthritis     Digestive disorder     Hypercholesteremia     Hypertension     Renal disorder     kidney stone       Past Surgical History:   Procedure Laterality Date    COLONOSCOPY  2016    Dr. Chauhan-[facility] 10 years    ROBOTIC ARTHROPLASTY, KNEE Left 4/4/2024    Procedure: ROBOTIC ARTHROPLASTY, KNEE, TOTAL, LEFT;  Surgeon: Martin Vo MD;  Location: [facility];  Service: Orthopedics;  Laterality: Left;  [place]-Nithin    VASECTOMY  2012       Current Outpatient Medications   Medication Sig Dispense Refill    ascorbic acid, vitamin C, (VITAMIN C) 1000 MG tablet Take 1,000 mg by mouth once daily.      aspirin (ECOTRIN) 81 MG EC tablet Take 81 mg by mouth 2 (two) times a day. For one month post-op      atorvastatin (LIPITOR) 20 MG tablet Take 1 tablet (20 mg total) by mouth once daily. (Patient taking differently: Take 20 mg by mouth once daily. 40MG EVERY OTHER DAY OR 20MG DAILY) 90 tablet 3    cholecalciferol, vitamin D3, (VITAMIN D3) 25 mcg (1,000 unit) capsule Take 1,000 Units by mouth once daily.      cyanocobalamin (VITAMIN B-12) 1000 MCG tablet Take 100 mcg by mouth once daily.      flaxseed oil 1,000 mg Cap Take by mouth.      ginkgo biloba 60 mg Cap Take by mouth.      gluc hull/chondro hull A/vit C/Mn (GLUCOSAMINE 1500 COMPLEX ORAL) Take by mouth.      ibuprofen (ADVIL,MOTRIN) 800 MG tablet Take 1 tablet (800 mg total) by mouth 3 (three) times daily. 90 tablet 2    losartan (COZAAR) 50 MG tablet Take 1 tablet (50 mg total) by mouth once  daily. 90 tablet 3    meloxicam (MOBIC) 15 MG tablet Take 1 tablet (15 mg total) by mouth daily as needed for Pain. 90 tablet 2    multivit-min/FA/lycopen/lutein (CENTRUM SILVER MEN ORAL) Take by mouth.      omega 3-dha-epa-fish oil 1,000 mg (120 mg-180 mg) Cap Take by mouth.      omeprazole (PRILOSEC) 40 MG capsule Take 1 capsule (40 mg total) by mouth once daily. 90 capsule 3    oxyCODONE-acetaminophen (PERCOCET) 7.5-325 mg per tablet Take 1 tablet by mouth every 6 (six) hours as needed for Pain. 28 tablet 0    propranoloL (INDERAL) 40 MG tablet Take 2 tablets (80 mg total) by mouth once daily. 180 tablet 3    TURMERIC ORAL Take by mouth.      vitamin E 400 UNIT capsule Take 400 Units by mouth once daily.       No current facility-administered medications for this visit.       Review of patient's allergies indicates:  No Known Allergies    No family history on file.    Social History     Socioeconomic History    Marital status:    Tobacco Use    Smoking status: Never    Smokeless tobacco: Never   Substance and Sexual Activity    Alcohol use: Yes     Alcohol/week: 1.0 standard drink of alcohol     Types: 1 Cans of beer per week     Comment: socially    Drug use: Never    Sexual activity: Yes     Partners: Female     Birth control/protection: See Surgical Hx     Social Determinants of Health     Financial Resource Strain: Patient Declined (12/14/2023)    Overall Financial Resource Strain (CARDIA)     Difficulty of Paying Living Expenses: Patient declined   Food Insecurity: Patient Declined (12/14/2023)    Hunger Vital Sign     Worried About Running Out of Food in the Last Year: Patient declined     Ran Out of Food in the Last Year: Patient declined   Transportation Needs: Patient Declined (12/14/2023)    PRAPARE - Transportation     Lack of Transportation (Medical): Patient declined     Lack of Transportation (Non-Medical): Patient declined   Physical Activity: Unknown (12/14/2023)    Exercise Vital Sign      Days of Exercise per Week: Patient declined     Minutes of Exercise per Session: 30 min   Stress: Patient Declined (12/14/2023)    [employer] - Occupational Stress Questionnaire     Feeling of Stress : Patient declined   Social Connections: Unknown (12/14/2023)    Social Connection and Isolation Panel [NHANES]     Frequency of Communication with Friends and Family: Patient declined     Frequency of Social Gatherings with Friends and Family: Patient declined     Active Member of Clubs or Organizations: Patient declined     Attends Club or Organization Meetings: Patient declined     Marital Status:    Housing Stability: Unknown (12/14/2023)    Housing Stability Vital Sign     Unable to Pay for Housing in the Last Year: Patient refused     Unstable Housing in the Last Year: Patient refused       History of present illness:  59-year-old female, returns to clinic today status post left total knee arthroplasty April 4th.  Patient is here today for routine follow-up.      Review of Systems:    Musculoskeletal:  See HPI       Objective:        Physical Examination:    Vital Signs: There were no vitals filed for this visit.    Body mass index is 33.72 kg/m².    This a well-developed, well nourished patient in no acute distress.  They are alert and oriented and cooperative to examination.        Examination of the left knee, the skin is dry and intact, no erythema, he still has some ecchymosis along the medial thigh and medial aspect of the knee ecchymosis, no signs symptoms of infection.  Scant amount of serous drainage noted from the distal incision from the robot attachment  Range motion 0-70 degrees.  Stable in flexion and extension.  Surgical staples were removed today.  Calf soft nontender, straight leg raise negative.    Pertinent New Results:     XRAY Report / Interpretation:        Assessment:       1. S/P total knee arthroplasty, left      Plan:     S/P total knee arthroplasty,  left  -     Cancel: X-Ray Knee 1 or 2 View Left  -     Ambulatory referral/consult to Physical/Occupational Therapy; Future; Expected date: 04/26/2024        Follow up for Mon/Wed with Dr. Vo, 3 week f/u - left TKA 4/4/24.    Stable status post robotic left total knee arthroplasty 2 weeks ago.  Patient is struggling with range of motion.  Specifically flexion..  Wounds are healing as expected, scant amount of drainage from the robot attachment.  Pain is modestly controlled with medications.  We will transition from home health to formal physical therapy.  He still has a fair amount of bruising in the mid thigh and swelling to the lower leg.  He still complains of some numbness or tingling, dysesthesias about the right knee so I gave him some gabapentin 300 b.i.d..      Recommended elevation, at least 1 hour 3 times a day above the level of the heart, lying flat in the bed head on the bed no pillows under his neck and 3 pillows under the leg.  No pillows under the knee.  Follow-up in 3 weeks.      DAMIEN Almendarez, PAJoseC    This note was created using [employer] voice recognition software that occasionally misinterprets words or phrases.

## 2024-04-26 ENCOUNTER — EXTERNAL HOME HEALTH (OUTPATIENT)
Dept: HOME HEALTH SERVICES | Facility: HOSPITAL | Age: 59
End: 2024-04-26
Payer: COMMERCIAL

## 2024-04-29 ENCOUNTER — OFFICE VISIT (OUTPATIENT)
Dept: ORTHOPEDICS | Facility: CLINIC | Age: 59
End: 2024-04-29
Payer: COMMERCIAL

## 2024-04-29 ENCOUNTER — HOSPITAL ENCOUNTER (OUTPATIENT)
Dept: RADIOLOGY | Facility: HOSPITAL | Age: 59
Discharge: HOME | End: 2024-04-29
Attending: ORTHOPAEDIC SURGERY
Payer: COMMERCIAL

## 2024-04-29 VITALS — BODY MASS INDEX: 33.64 KG/M2 | HEIGHT: 70 IN | WEIGHT: 235 LBS

## 2024-04-29 DIAGNOSIS — Z96.652 S/P TOTAL KNEE ARTHROPLASTY, LEFT: Primary | ICD-10-CM

## 2024-04-29 DIAGNOSIS — M79.605 LEFT LEG PAIN: ICD-10-CM

## 2024-04-29 PROCEDURE — 99024 POSTOP FOLLOW-UP VISIT: CPT | Mod: S$GLB,,, | Performed by: ORTHOPAEDIC SURGERY

## 2024-04-29 PROCEDURE — 93971 EXTREMITY STUDY: CPT | Mod: TC,LT

## 2024-04-29 PROCEDURE — 1159F MED LIST DOCD IN RCRD: CPT | Mod: CPTII,S$GLB,, | Performed by: ORTHOPAEDIC SURGERY

## 2024-04-29 PROCEDURE — 93971 EXTREMITY STUDY: CPT | Mod: 26,LT,, | Performed by: RADIOLOGY

## 2024-04-29 PROCEDURE — 4010F ACE/ARB THERAPY RXD/TAKEN: CPT | Mod: CPTII,S$GLB,, | Performed by: ORTHOPAEDIC SURGERY

## 2024-04-29 PROCEDURE — 1160F RVW MEDS BY RX/DR IN RCRD: CPT | Mod: CPTII,S$GLB,, | Performed by: ORTHOPAEDIC SURGERY

## 2024-04-29 RX ORDER — METHYLPREDNISOLONE 4 MG/1
TABLET ORAL
Qty: 21 EACH | Refills: 0 | Status: SHIPPED | OUTPATIENT
Start: 2024-04-29 | End: 2024-05-20

## 2024-04-29 NOTE — PROGRESS NOTES
Subjective:    Patient ID: Trino Head is a 59 y.o. male.    Chief Complaint: Post-op Evaluation of the Left Knee (Patient is here for a PO f/up Left TKA 4.4.24, has increased swelling, states his pain is about the same. ROM  is at 60 degrees)      History of Present Illness    Prior to meeting with the patient I reviewed the medical chart in [facility]. This included reviewing the previous progress notes from our office, review of the patient's last appointment with their primary care provider, review of any visits to the emergency room, and review of any pain management appointments or procedures.  Román comes in today for follow-up his left total knee arthroplasty.  He is about 3 and half weeks postop.  Continues to have stiffness and swelling.  Comes in today to have this evaluated.  He has been working with outpatient physical therapy.    Current Medications  Current Outpatient Medications   Medication Sig Dispense Refill    ascorbic acid, vitamin C, (VITAMIN C) 1000 MG tablet Take 1,000 mg by mouth once daily.      aspirin (ECOTRIN) 81 MG EC tablet Take 81 mg by mouth 2 (two) times a day. For one month post-op      atorvastatin (LIPITOR) 20 MG tablet Take 1 tablet (20 mg total) by mouth once daily. (Patient taking differently: Take 20 mg by mouth once daily. 40MG EVERY OTHER DAY OR 20MG DAILY) 90 tablet 3    cholecalciferol, vitamin D3, (VITAMIN D3) 25 mcg (1,000 unit) capsule Take 1,000 Units by mouth once daily.      cyanocobalamin (VITAMIN B-12) 1000 MCG tablet Take 100 mcg by mouth once daily.      flaxseed oil 1,000 mg Cap Take by mouth.      gabapentin (NEURONTIN) 300 MG capsule Take 1 capsule (300 mg total) by mouth 2 (two) times daily. Take as directed twice daily after joint replacement surgery as an adjunct to pain medication. 60 capsule 0    ginkgo biloba 60 mg Cap Take by mouth.      gluc hull/chondro hull A/vit C/Mn (GLUCOSAMINE 1500 COMPLEX ORAL) Take by mouth.      ibuprofen (ADVIL,MOTRIN) 800 MG  tablet Take 1 tablet (800 mg total) by mouth 3 (three) times daily. 90 tablet 2    losartan (COZAAR) 50 MG tablet Take 1 tablet (50 mg total) by mouth once daily. 90 tablet 3    meloxicam (MOBIC) 15 MG tablet Take 1 tablet (15 mg total) by mouth daily as needed for Pain. 90 tablet 2    multivit-min/FA/lycopen/lutein (CENTRUM SILVER MEN ORAL) Take by mouth.      omega 3-dha-epa-fish oil 1,000 mg (120 mg-180 mg) Cap Take by mouth.      omeprazole (PRILOSEC) 40 MG capsule Take 1 capsule (40 mg total) by mouth once daily. 90 capsule 3    oxyCODONE-acetaminophen (PERCOCET) 7.5-325 mg per tablet Take 1 tablet by mouth every 6 (six) hours as needed for Pain. 28 tablet 0    propranoloL (INDERAL) 40 MG tablet Take 2 tablets (80 mg total) by mouth once daily. 180 tablet 3    TURMERIC ORAL Take by mouth.      vitamin E 400 UNIT capsule Take 400 Units by mouth once daily.       No current facility-administered medications for this visit.       Allergies  Review of patient's allergies indicates:  No Known Allergies    Past Medical History  Past Medical History:   Diagnosis Date    Arthritis     Digestive disorder     Hypercholesteremia     Hypertension     Renal disorder     kidney stone       Surgical History  Past Surgical History:   Procedure Laterality Date    COLONOSCOPY  2016    Dr. Chauhan[facility] 10 years    ROBOTIC ARTHROPLASTY, KNEE Left 4/4/2024    Procedure: ROBOTIC ARTHROPLASTY, KNEE, TOTAL, LEFT;  Surgeon: Martin Vo MD;  Location: [facility];  Service: Orthopedics;  Laterality: Left;  [place]-Nithin    VASECTOMY  2012       Family History:   No family history on file.    Social History:   Social History     Socioeconomic History    Marital status:    Tobacco Use    Smoking status: Never    Smokeless tobacco: Never   Substance and Sexual Activity    Alcohol use: Yes     Alcohol/week: 1.0 standard drink of alcohol     Types: 1 Cans of beer per week     Comment: socially    Drug use: Never    Sexual activity:  Yes     Partners: Female     Birth control/protection: See Surgical Hx     Social Determinants of Health     Financial Resource Strain: Patient Declined (12/14/2023)    Overall Financial Resource Strain (CARDIA)     Difficulty of Paying Living Expenses: Patient declined   Food Insecurity: Patient Declined (12/14/2023)    Hunger Vital Sign     Worried About Running Out of Food in the Last Year: Patient declined     Ran Out of Food in the Last Year: Patient declined   Transportation Needs: Patient Declined (12/14/2023)    PRAPARE - Transportation     Lack of Transportation (Medical): Patient declined     Lack of Transportation (Non-Medical): Patient declined   Physical Activity: Unknown (12/14/2023)    Exercise Vital Sign     Days of Exercise per Week: Patient declined     Minutes of Exercise per Session: 30 min   Stress: Patient Declined (12/14/2023)    [employer] [place] [employer] - Occupational Stress Questionnaire     Feeling of Stress : Patient declined   Social Connections: Unknown (12/14/2023)    Social Connection and Isolation Panel [NHANES]     Frequency of Communication with Friends and Family: Patient declined     Frequency of Social Gatherings with Friends and Family: Patient declined     Active Member of Clubs or Organizations: Patient declined     Attends Club or Organization Meetings: Patient declined     Marital Status:    Housing Stability: Unknown (12/14/2023)    Housing Stability Vital Sign     Unable to Pay for Housing in the Last Year: Patient refused     Unstable Housing in the Last Year: Patient refused       Date of surgery:  April 4, 2024    Review of Systems     General/Constitutional: Chills denies. Fatigue denies. Fever denies. Weight gain denies. Weight loss denies.    Musculoskeletal: Comments: See HPI for details    Skin: Rash denies.    Objective:   Vital Signs: There were no vitals filed for this visit.     Physical Exam    This a well-developed, well nourished  "patient in no acute distress.  They are alert and oriented and cooperative to examination.  Pt. walks without an antalgic gait.      General Examination:     Constitutional: The patient is alert and oriented to lace person and time. Mood is pleasant.     Head/Face: Normal facial features normal eyebrows    Eyes: Normal extraocular motion bilaterally    Lungs: Respirations are equal and unlabored    Gait is coordinated.    Cardiovascular: There are no swelling or varicosities present.    Lymphatic: Negative for adenopathy    Skin: Normal    Neurological: Level of consciousness normal. Oriented to place person and time and situation    Psychiatric: Oriented to time place person and situation    Left knee exam:  Skin to left knee clean dry and intact.  No erythema.  He has some resolving ecchymosis about the medial aspect of the left leg just proximal to the knee joint.  Left calf is firm and mildly tender.  Left knee range of motion roughly 0-60 degrees.  It is stable to varus and valgus stresses.  Can weightbear as tolerated on the left lower extremity but has an antalgic gait.  Left knee anterior midline incision healing well without wound dehiscence or drainage.  No signs or symptoms of infection.  He is neurovascularly intact throughout the left lower extremity.      XRAY Report/ Interpretation:  No new radiographs taken on today's clinic visit.  Results of ultrasound study done today were negative for deep venous thrombosis therefore we will start this gentleman Medrol Dosepak to try to reduce his swelling      Assessment:       1. S/P total knee arthroplasty, left    2. Left leg pain        Plan:       Trino Lucero" was seen today for post-op evaluation.    Diagnoses and all orders for this visit:    S/P total knee arthroplasty, left    Left leg pain  -     US Lower Extremity Veins Left; Future         Follow up for Keep Appt On 5/8/24.  This is to attest that the physician's assistant Mata Kwon  served in " the capacity as a scribe for this patient's encounter.  This is also to verify that I have reviewed the patient's history and helped formulate the treatment plan and discussed it with the physician's assistant.  I have actively participated in the evaluation and treatment plan for this patient.  The visit plan and medical decision-making is as outlined below plan is listed as below    Most worrisome concern would be that he possibly has developed a deep vein thrombosis in his left lower extremity.  We will stand for a stat Doppler ultrasound to evaluate for this.  If it is negative, ultrasound was negative       I would like to place him on a Medrol Dosepak to colin any current inflammation to try to allow PT to increase his range of motion.  Want to check him back in about 10 days to see how that is.  If he has not much improved to at least 80-90 degrees, consideration of a closed manipulation under anesthesia may be warranted to improve his flexion.  This was discussed with him and his spouse today.    Treatment options were discussed with regards to the nature of the medical condition. Conservative pain intervention and surgical options were discussed in detail. The probability of success of each separate treatment option was discussed. The patient expressed a clear understanding of the treatment options. With regards to surgery, the procedure risk, benefits, complications, and outcomes were discussed. No guarantees were given with regards to surgical outcome.   The risk of complications, morbidity, and mortality of patient management decisions have been made at the time of this visit. These are associated with the patient's problems, diagnostic procedures and treatment options. This includes the possible management options selected and those considered but not selected by the patient after shared medical decision making we discussed with the patient.     This note was created using Dragon voice recognition  software that occasionally misinterpreted phrases or words.

## 2024-05-08 ENCOUNTER — OFFICE VISIT (OUTPATIENT)
Dept: ORTHOPEDICS | Facility: CLINIC | Age: 59
End: 2024-05-08
Payer: COMMERCIAL

## 2024-05-08 VITALS — WEIGHT: 235 LBS | BODY MASS INDEX: 33.64 KG/M2 | HEIGHT: 70 IN

## 2024-05-08 DIAGNOSIS — Z96.652 S/P TOTAL KNEE ARTHROPLASTY, LEFT: Primary | ICD-10-CM

## 2024-05-08 PROCEDURE — 1159F MED LIST DOCD IN RCRD: CPT | Mod: CPTII,S$GLB,, | Performed by: ORTHOPAEDIC SURGERY

## 2024-05-08 PROCEDURE — 4010F ACE/ARB THERAPY RXD/TAKEN: CPT | Mod: CPTII,S$GLB,, | Performed by: ORTHOPAEDIC SURGERY

## 2024-05-08 PROCEDURE — 99024 POSTOP FOLLOW-UP VISIT: CPT | Mod: S$GLB,,, | Performed by: ORTHOPAEDIC SURGERY

## 2024-05-08 PROCEDURE — 1160F RVW MEDS BY RX/DR IN RCRD: CPT | Mod: CPTII,S$GLB,, | Performed by: ORTHOPAEDIC SURGERY

## 2024-05-08 RX ORDER — HYDROCODONE BITARTRATE AND ACETAMINOPHEN 10; 325 MG/1; MG/1
1 TABLET ORAL EVERY 6 HOURS PRN
Qty: 28 TABLET | Refills: 0 | Status: SHIPPED | OUTPATIENT
Start: 2024-05-08 | End: 2024-05-15

## 2024-05-08 NOTE — PROGRESS NOTES
Subjective:    Patient ID: Trino Head is a 59 y.o. male.    Chief Complaint: Post-op Evaluation of the Left Knee (Patient is here for PO left TKA 4.4.24. States pain is better since last visit.)      History of Present Illness    Prior to meeting with the patient I reviewed the medical chart in [facility]. This included reviewing the previous progress notes from our office, review of the patient's last appointment with their primary care provider, review of any visits to the emergency room, and review of any pain management appointments or procedures.  The range of motion much improved since last visit now has 90° of active flexion still have some diffuse induration of his calf and distal thigh however venous Doppler studies performed were negative for deep venous thrombosis is using a Xiao splint also    Current Medications  Current Outpatient Medications   Medication Sig Dispense Refill    ascorbic acid, vitamin C, (VITAMIN C) 1000 MG tablet Take 1,000 mg by mouth once daily.      atorvastatin (LIPITOR) 20 MG tablet Take 1 tablet (20 mg total) by mouth once daily. (Patient taking differently: Take 20 mg by mouth once daily. 40MG EVERY OTHER DAY OR 20MG DAILY) 90 tablet 3    cholecalciferol, vitamin D3, (VITAMIN D3) 25 mcg (1,000 unit) capsule Take 1,000 Units by mouth once daily.      cyanocobalamin (VITAMIN B-12) 1000 MCG tablet Take 100 mcg by mouth once daily.      flaxseed oil 1,000 mg Cap Take by mouth.      gabapentin (NEURONTIN) 300 MG capsule Take 1 capsule (300 mg total) by mouth 2 (two) times daily. Take as directed twice daily after joint replacement surgery as an adjunct to pain medication. 60 capsule 0    ginkgo biloba 60 mg Cap Take by mouth.      gluc hull/chondro hull A/vit C/Mn (GLUCOSAMINE 1500 COMPLEX ORAL) Take by mouth.      ibuprofen (ADVIL,MOTRIN) 800 MG tablet Take 1 tablet (800 mg total) by mouth 3 (three) times daily. 90 tablet 2    losartan (COZAAR) 50 MG tablet Take 1 tablet (50 mg total)  by mouth once daily. 90 tablet 3    meloxicam (MOBIC) 15 MG tablet Take 1 tablet (15 mg total) by mouth daily as needed for Pain. 90 tablet 2    multivit-min/FA/lycopen/lutein (CENTRUM SILVER MEN ORAL) Take by mouth.      omega 3-dha-epa-fish oil 1,000 mg (120 mg-180 mg) Cap Take by mouth.      omeprazole (PRILOSEC) 40 MG capsule Take 1 capsule (40 mg total) by mouth once daily. 90 capsule 3    oxyCODONE-acetaminophen (PERCOCET) 7.5-325 mg per tablet Take 1 tablet by mouth every 6 (six) hours as needed for Pain. 28 tablet 0    propranoloL (INDERAL) 40 MG tablet Take 2 tablets (80 mg total) by mouth once daily. 180 tablet 3    TURMERIC ORAL Take by mouth.      vitamin E 400 UNIT capsule Take 400 Units by mouth once daily.      methylPREDNISolone (MEDROL DOSEPACK) 4 mg tablet use as directed (Patient not taking: Reported on 5/8/2024) 21 each 0     No current facility-administered medications for this visit.       Allergies  Review of patient's allergies indicates:  No Known Allergies    Past Medical History  Past Medical History:   Diagnosis Date    Arthritis     Digestive disorder     Hypercholesteremia     Hypertension     Renal disorder     kidney stone       Surgical History  Past Surgical History:   Procedure Laterality Date    COLONOSCOPY  2016    Dr. Chauhan-[facility] 10 years    ROBOTIC ARTHROPLASTY, KNEE Left 4/4/2024    Procedure: ROBOTIC ARTHROPLASTY, KNEE, TOTAL, LEFT;  Surgeon: Martin Vo MD;  Location: [facility];  Service: Orthopedics;  Laterality: Left;  [place]    VASECTOMY  2012       Family History:   No family history on file.    Social History:   Social History     Socioeconomic History    Marital status:    Tobacco Use    Smoking status: Never    Smokeless tobacco: Never   Substance and Sexual Activity    Alcohol use: Yes     Alcohol/week: 1.0 standard drink of alcohol     Types: 1 Cans of beer per week     Comment: socially    Drug use: Never    Sexual activity: Yes     Partners:  Female     Birth control/protection: See Surgical Hx     Social Determinants of Health     Financial Resource Strain: Patient Declined (12/14/2023)    Overall Financial Resource Strain (CARDIA)     Difficulty of Paying Living Expenses: Patient declined   Food Insecurity: Patient Declined (12/14/2023)    Hunger Vital Sign     Worried About Running Out of Food in the Last Year: Patient declined     Ran Out of Food in the Last Year: Patient declined   Transportation Needs: Patient Declined (12/14/2023)    PRAPARE - Transportation     Lack of Transportation (Medical): Patient declined     Lack of Transportation (Non-Medical): Patient declined   Physical Activity: Unknown (12/14/2023)    Exercise Vital Sign     Days of Exercise per Week: Patient declined     Minutes of Exercise per Session: 30 min   Stress: Patient Declined (12/14/2023)    [employer] [place] [employer] - Occupational Stress Questionnaire     Feeling of Stress : Patient declined   Housing Stability: Unknown (12/14/2023)    Housing Stability Vital Sign     Unable to Pay for Housing in the Last Year: Patient refused     Unstable Housing in the Last Year: Patient refused       Date of surgery:     Review of Systems     General/Constitutional: Chills denies. Fatigue denies. Fever denies. Weight gain denies. Weight loss denies.    Musculoskeletal: Comments: See HPI for details    Skin: Rash denies.    Objective:   Vital Signs: There were no vitals filed for this visit.     Physical Exam    This a well-developed, well nourished patient in no acute distress.  They are alert and oriented and cooperative to examination.  Pt. walks without an antalgic gait.      General Examination:     Constitutional: The patient is alert and oriented to lace person and time. Mood is pleasant.     Head/Face: Normal facial features normal eyebrows    Eyes: Normal extraocular motion bilaterally    Lungs: Respirations are equal and unlabored    Gait is  "coordinated.    Cardiovascular: There are no swelling or varicosities present.    Lymphatic: Negative for adenopathy    Skin: Normal    Neurological: Level of consciousness normal. Oriented to place person and time and situation    Psychiatric: Oriented to time place person and situation    Active flexion 90° active extension -5 degrees today some persistent the hardness or induration of the calf and distal thigh consistent with venous stasis  XRAY Report/ Interpretation:  AP and lateral left knees x-rays were reviewed today left total knee arthroplasty components in good position no signs of loosening      Assessment:       1. S/P total knee arthroplasty, left        Plan:       Trino Lucero" was seen today for post-op evaluation.    Diagnoses and all orders for this visit:    S/P total knee arthroplasty, left  -     X-Ray Knee 1 or 2 View Left         No follow-ups on file.    Patient advised to we will order a thigh-high compressive AMAYA hose for him to try to get down some of the swelling in his leg although I expect this improve with time we have established that the Doppler study did not show any evidence of deep venous thrombosis he agrees with this treatment plan  Treatment options were discussed with regards to the nature of the medical condition. Conservative pain intervention and surgical options were discussed in detail. The probability of success of each separate treatment option was discussed. The patient expressed a clear understanding of the treatment options. With regards to surgery, the procedure risk, benefits, complications, and outcomes were discussed. No guarantees were given with regards to surgical outcome.   The risk of complications, morbidity, and mortality of patient management decisions have been made at the time of this visit. These are associated with the patient's problems, diagnostic procedures and treatment options. This includes the possible management options selected and those " considered but not selected by the patient after shared medical decision making we discussed with the patient.     This note was created using Dragon voice recognition software that occasionally misinterpreted phrases or words.

## 2024-05-17 RX ORDER — LOSARTAN POTASSIUM 50 MG/1
50 TABLET ORAL DAILY
Qty: 90 TABLET | Refills: 3 | Status: SHIPPED | OUTPATIENT
Start: 2024-05-17

## 2024-05-29 ENCOUNTER — OFFICE VISIT (OUTPATIENT)
Dept: ORTHOPEDICS | Facility: CLINIC | Age: 59
End: 2024-05-29
Payer: COMMERCIAL

## 2024-05-29 VITALS
WEIGHT: 235 LBS | HEIGHT: 70 IN | BODY MASS INDEX: 33.64 KG/M2 | SYSTOLIC BLOOD PRESSURE: 136 MMHG | DIASTOLIC BLOOD PRESSURE: 96 MMHG

## 2024-05-29 DIAGNOSIS — M17.0 OSTEOARTHRITIS OF BOTH KNEES, UNSPECIFIED OSTEOARTHRITIS TYPE: ICD-10-CM

## 2024-05-29 DIAGNOSIS — Z96.652 S/P TOTAL KNEE ARTHROPLASTY, LEFT: Primary | ICD-10-CM

## 2024-05-29 PROCEDURE — 1160F RVW MEDS BY RX/DR IN RCRD: CPT | Mod: CPTII,S$GLB,, | Performed by: ORTHOPAEDIC SURGERY

## 2024-05-29 PROCEDURE — 3080F DIAST BP >= 90 MM HG: CPT | Mod: CPTII,S$GLB,, | Performed by: ORTHOPAEDIC SURGERY

## 2024-05-29 PROCEDURE — 3075F SYST BP GE 130 - 139MM HG: CPT | Mod: CPTII,S$GLB,, | Performed by: ORTHOPAEDIC SURGERY

## 2024-05-29 PROCEDURE — 1159F MED LIST DOCD IN RCRD: CPT | Mod: CPTII,S$GLB,, | Performed by: ORTHOPAEDIC SURGERY

## 2024-05-29 PROCEDURE — 4010F ACE/ARB THERAPY RXD/TAKEN: CPT | Mod: CPTII,S$GLB,, | Performed by: ORTHOPAEDIC SURGERY

## 2024-05-29 PROCEDURE — 99024 POSTOP FOLLOW-UP VISIT: CPT | Mod: S$GLB,,, | Performed by: ORTHOPAEDIC SURGERY

## 2024-05-29 RX ORDER — HYDROCODONE BITARTRATE AND ACETAMINOPHEN 7.5; 325 MG/1; MG/1
1 TABLET ORAL EVERY 8 HOURS PRN
Qty: 21 TABLET | Refills: 0 | Status: SHIPPED | OUTPATIENT
Start: 2024-05-29 | End: 2024-06-05

## 2024-05-29 RX ORDER — MELOXICAM 15 MG/1
15 TABLET ORAL DAILY
Qty: 30 TABLET | Refills: 2 | Status: SHIPPED | OUTPATIENT
Start: 2024-05-29

## 2024-05-29 NOTE — PROGRESS NOTES
Subjective:    Patient ID: Trino Head is a 59 y.o. male.    Chief Complaint: Post-op Evaluation of the Left Knee (Patient is here for a PO f/u on left TKA 4.4.24. States pain has stayed about the same. Has swelling, numbness and tingling. Denies any popping or grinding )      History of Present Illness    Prior to meeting with the patient I reviewed the medical chart in [facility]. This included reviewing the previous progress notes from our office, review of the patient's last appointment with their primary care provider, review of any visits to the emergency room, and review of any pain management appointments or procedures.  Román comes in today for follow-up for his left total knee arthroplasty.  He is about 7 weeks postop.  He continues to work PT. He is still has some stiffness and swelling.    Current Medications  Current Outpatient Medications   Medication Sig Dispense Refill    ascorbic acid, vitamin C, (VITAMIN C) 1000 MG tablet Take 1,000 mg by mouth once daily.      atorvastatin (LIPITOR) 20 MG tablet Take 1 tablet (20 mg total) by mouth once daily. (Patient taking differently: Take 20 mg by mouth once daily. 40MG EVERY OTHER DAY OR 20MG DAILY) 90 tablet 3    cholecalciferol, vitamin D3, (VITAMIN D3) 25 mcg (1,000 unit) capsule Take 1,000 Units by mouth once daily.      cyanocobalamin (VITAMIN B-12) 1000 MCG tablet Take 100 mcg by mouth once daily.      flaxseed oil 1,000 mg Cap Take by mouth.      gabapentin (NEURONTIN) 300 MG capsule Take 1 capsule (300 mg total) by mouth 2 (two) times daily. Take as directed twice daily after joint replacement surgery as an adjunct to pain medication. 60 capsule 0    ginkgo biloba 60 mg Cap Take by mouth.      gluc hull/chondro hull A/vit C/Mn (GLUCOSAMINE 1500 COMPLEX ORAL) Take by mouth.      ibuprofen (ADVIL,MOTRIN) 800 MG tablet Take 1 tablet (800 mg total) by mouth 3 (three) times daily. 90 tablet 2    losartan (COZAAR) 50 MG tablet Take 1 tablet (50 mg total) by  mouth once daily. 90 tablet 3    multivit-min/FA/lycopen/lutein (CENTRUM SILVER MEN ORAL) Take by mouth.      omega 3-dha-epa-fish oil 1,000 mg (120 mg-180 mg) Cap Take by mouth.      omeprazole (PRILOSEC) 40 MG capsule Take 1 capsule (40 mg total) by mouth once daily. 90 capsule 3    propranoloL (INDERAL) 40 MG tablet Take 2 tablets (80 mg total) by mouth once daily. 180 tablet 3    TURMERIC ORAL Take by mouth.      vitamin E 400 UNIT capsule Take 400 Units by mouth once daily.      meloxicam (MOBIC) 15 MG tablet Take 1 tablet (15 mg total) by mouth once daily. 30 tablet 2     No current facility-administered medications for this visit.       Allergies  Review of patient's allergies indicates:  No Known Allergies    Past Medical History  Past Medical History:   Diagnosis Date    Arthritis     Digestive disorder     Hypercholesteremia     Hypertension     Renal disorder     kidney stone       Surgical History  Past Surgical History:   Procedure Laterality Date    COLONOSCOPY  2016    Dr. Chauhan-[facility] 10 years    ROBOTIC ARTHROPLASTY, KNEE Left 4/4/2024    Procedure: ROBOTIC ARTHROPLASTY, KNEE, TOTAL, LEFT;  Surgeon: Martin Vo MD;  Location: [facility];  Service: Orthopedics;  Laterality: Left;  [place]    VASECTOMY  2012       Family History:   No family history on file.    Social History:   Social History     Socioeconomic History    Marital status:    Tobacco Use    Smoking status: Never    Smokeless tobacco: Never   Substance and Sexual Activity    Alcohol use: Yes     Alcohol/week: 1.0 standard drink of alcohol     Types: 1 Cans of beer per week     Comment: socially    Drug use: Never    Sexual activity: Yes     Partners: Female     Birth control/protection: See Surgical Hx     Social Determinants of Health     Financial Resource Strain: Patient Declined (12/14/2023)    Overall Financial Resource Strain (CARDIA)     Difficulty of Paying Living Expenses: Patient declined   Food Insecurity:  Patient Declined (12/14/2023)    Hunger Vital Sign     Worried About Running Out of Food in the Last Year: Patient declined     Ran Out of Food in the Last Year: Patient declined   Transportation Needs: Patient Declined (12/14/2023)    PRAPARE - Transportation     Lack of Transportation (Medical): Patient declined     Lack of Transportation (Non-Medical): Patient declined   Physical Activity: Unknown (12/14/2023)    Exercise Vital Sign     Days of Exercise per Week: Patient declined     Minutes of Exercise per Session: 30 min   Stress: Patient Declined (12/14/2023)    [employer] [place] [employer] - Occupational Stress Questionnaire     Feeling of Stress : Patient declined   Housing Stability: Unknown (12/14/2023)    Housing Stability Vital Sign     Unable to Pay for Housing in the Last Year: Patient refused     Unstable Housing in the Last Year: Patient refused       Date of surgery:  April 4, 2024    Review of Systems     General/Constitutional: Chills denies. Fatigue denies. Fever denies. Weight gain denies. Weight loss denies.    Musculoskeletal: Comments: See HPI for details    Skin: Rash denies.    Objective:   Vital Signs:   Vitals:    05/29/24 1018   BP: (!) 136/96        Physical Exam    This a well-developed, well nourished patient in no acute distress.  They are alert and oriented and cooperative to examination.  Pt. walks without an antalgic gait.      General Examination:     Constitutional: The patient is alert and oriented to lace person and time. Mood is pleasant.     Head/Face: Normal facial features normal eyebrows    Eyes: Normal extraocular motion bilaterally    Lungs: Respirations are equal and unlabored    Gait is coordinated.    Cardiovascular: There are no swelling or varicosities present.    Lymphatic: Negative for adenopathy    Skin: Normal    Neurological: Level of consciousness normal. Oriented to place person and time and situation    Psychiatric: Oriented to time place person  "and situation    Left knee exam: Skin to left knee clean dry and intact.  No erythema or ecchymosis.  No signs or symptoms of infection.  Left knee anterior midline incision healing well without wound dehiscence or drainage.  Mild keloiding.  Left calf soft but mildly tender.  Previous Doppler ultrasound negative for DVT.  He can extend to 0 and flexes to 90° perhaps 95 at best on today's physical exam.  The knee is stable to varus and valgus stresses.  He can weightbear as tolerated on the left lower extremity.      XRAY Report/ Interpretation:  No new radiographs taken on today's clinic visit.      Assessment:       1. S/P total knee arthroplasty, left    2. Osteoarthritis of both knees, unspecified osteoarthritis type        Plan:       Trino Lucero" was seen today for post-op evaluation.    Diagnoses and all orders for this visit:    S/P total knee arthroplasty, left    Osteoarthritis of both knees, unspecified osteoarthritis type  -     meloxicam (MOBIC) 15 MG tablet; Take 1 tablet (15 mg total) by mouth once daily.         Follow up for PO / 1 month f/u - left TKA 4/4/24.  This is to attest that the physician's assistant Mata Kwon  served in the capacity as a scribe for this patient's encounter.  This is also to verify that I have reviewed the patient's history and helped formulate the treatment plan and discussed it with the physician's assistant.  I have actively participated in the evaluation and treatment plan for this patient.  The visit plan and medical decision-making is as outlined below  Reassurance provided to the patient and his spouse today.  Continue working with PT. He can get to 90° and just beyond.  I do not think a manipulation under anesthesia as an absolute necessity at this time.  We will start him on Mobic 15 mg by mouth once a day with food.  I did provide a refill of his Norco 7.5/325 #21 pain medication.  We will check him back in 1 month to see how he has progressing with the " left knee further discuss the possibility of scheduling his right total knee arthroplasty at that time.      Treatment options were discussed with regards to the nature of the medical condition. Conservative pain intervention and surgical options were discussed in detail. The probability of success of each separate treatment option was discussed. The patient expressed a clear understanding of the treatment options. With regards to surgery, the procedure risk, benefits, complications, and outcomes were discussed. No guarantees were given with regards to surgical outcome.   The risk of complications, morbidity, and mortality of patient management decisions have been made at the time of this visit. These are associated with the patient's problems, diagnostic procedures and treatment options. This includes the possible management options selected and those considered but not selected by the patient after shared medical decision making we discussed with the patient.     This note was created using Dragon voice recognition software that occasionally misinterpreted phrases or words.

## 2024-06-06 ENCOUNTER — PATIENT MESSAGE (OUTPATIENT)
Dept: FAMILY MEDICINE | Facility: CLINIC | Age: 59
End: 2024-06-06
Payer: COMMERCIAL

## 2024-06-17 DIAGNOSIS — Z96.652 S/P TOTAL KNEE ARTHROPLASTY, LEFT: ICD-10-CM

## 2024-06-17 RX ORDER — HYDROCODONE BITARTRATE AND ACETAMINOPHEN 5; 325 MG/1; MG/1
1 TABLET ORAL EVERY 8 HOURS PRN
Qty: 21 TABLET | Refills: 0 | Status: SHIPPED | OUTPATIENT
Start: 2024-06-17

## 2024-06-17 RX ORDER — HYDROCODONE BITARTRATE AND ACETAMINOPHEN 7.5; 325 MG/1; MG/1
1 TABLET ORAL EVERY 8 HOURS PRN
Qty: 21 TABLET | Refills: 0 | Status: CANCELLED | OUTPATIENT
Start: 2024-06-17 | End: 2024-06-24

## 2024-06-19 ENCOUNTER — OFFICE VISIT (OUTPATIENT)
Dept: FAMILY MEDICINE | Facility: CLINIC | Age: 59
End: 2024-06-19
Payer: COMMERCIAL

## 2024-06-19 VITALS
WEIGHT: 239 LBS | BODY MASS INDEX: 34.22 KG/M2 | HEART RATE: 78 BPM | SYSTOLIC BLOOD PRESSURE: 138 MMHG | DIASTOLIC BLOOD PRESSURE: 88 MMHG | HEIGHT: 70 IN

## 2024-06-19 DIAGNOSIS — M17.12 PRIMARY OSTEOARTHRITIS OF LEFT KNEE: ICD-10-CM

## 2024-06-19 DIAGNOSIS — I10 PRIMARY HYPERTENSION: ICD-10-CM

## 2024-06-19 DIAGNOSIS — Z96.652 HISTORY OF LEFT KNEE REPLACEMENT: Primary | ICD-10-CM

## 2024-06-19 DIAGNOSIS — Z12.5 SPECIAL SCREENING FOR MALIGNANT NEOPLASM OF PROSTATE: ICD-10-CM

## 2024-06-19 DIAGNOSIS — E78.00 PURE HYPERCHOLESTEROLEMIA: ICD-10-CM

## 2024-06-19 DIAGNOSIS — M19.011 PRIMARY OSTEOARTHRITIS OF BOTH SHOULDERS: ICD-10-CM

## 2024-06-19 DIAGNOSIS — N20.0 NEPHROLITHIASIS: ICD-10-CM

## 2024-06-19 DIAGNOSIS — F51.01 PRIMARY INSOMNIA: ICD-10-CM

## 2024-06-19 DIAGNOSIS — K21.9 GASTROESOPHAGEAL REFLUX DISEASE WITHOUT ESOPHAGITIS: ICD-10-CM

## 2024-06-19 DIAGNOSIS — M19.012 PRIMARY OSTEOARTHRITIS OF BOTH SHOULDERS: ICD-10-CM

## 2024-06-19 DIAGNOSIS — G25.2 INTENTION TREMOR: ICD-10-CM

## 2024-06-19 PROCEDURE — 3079F DIAST BP 80-89 MM HG: CPT | Mod: CPTII,S$GLB,, | Performed by: FAMILY MEDICINE

## 2024-06-19 PROCEDURE — 3075F SYST BP GE 130 - 139MM HG: CPT | Mod: CPTII,S$GLB,, | Performed by: FAMILY MEDICINE

## 2024-06-19 PROCEDURE — 3008F BODY MASS INDEX DOCD: CPT | Mod: CPTII,S$GLB,, | Performed by: FAMILY MEDICINE

## 2024-06-19 PROCEDURE — 1159F MED LIST DOCD IN RCRD: CPT | Mod: CPTII,S$GLB,, | Performed by: FAMILY MEDICINE

## 2024-06-19 PROCEDURE — 99214 OFFICE O/P EST MOD 30 MIN: CPT | Mod: S$GLB,,, | Performed by: FAMILY MEDICINE

## 2024-06-19 PROCEDURE — 4010F ACE/ARB THERAPY RXD/TAKEN: CPT | Mod: CPTII,S$GLB,, | Performed by: FAMILY MEDICINE

## 2024-06-19 RX ORDER — TEMAZEPAM 15 MG/1
15 CAPSULE ORAL NIGHTLY PRN
Qty: 30 CAPSULE | Refills: 0 | Status: SHIPPED | OUTPATIENT
Start: 2024-06-19 | End: 2024-07-19

## 2024-06-25 PROBLEM — F51.01 PRIMARY INSOMNIA: Status: ACTIVE | Noted: 2024-06-25

## 2024-06-25 PROBLEM — Z96.652 HISTORY OF LEFT KNEE REPLACEMENT: Status: ACTIVE | Noted: 2024-06-25

## 2024-06-25 NOTE — PROGRESS NOTES
SUBJECTIVE:    Patient ID: Trino Head is a 59 y.o. male.    Chief Complaint: Annual Exam (Review Lab-results, left knee replacement April 4/24, no bottles, abc  )    59-year-old male who had a left TKR 3 months ago with Dr. Martin Vo.  Patient is still has ongoing physical therapy and is able to flex the knee 108° he has had 2 different ultrasounds of the swelling of the leg which were negative for DVT.  Takes aspirin 81 mg daily hydrocodone 5/325 mg HS meloxicam 15 mg daily.  His other knee is also has severe osteoarthritis and may need a right TKR.  Walking up and down stairs is quite difficult.    He works as a  for [employer].  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  He works on his feet all day.    Hyperlipidemia, on Lipitor 20 mg daily    Has some insomnia due to knee discomfort at night he walks with a limp.    2016-colonoscopy with Dr. Chauhan-[facility] 10 years        Admission on 04/10/2024, Discharged on 04/10/2024   Component Date Value Ref Range Status    WBC 04/10/2024 6.81  3.90 - 12.70 K/uL Final    RBC 04/10/2024 3.30 (L)  4.60 - 6.20 M/uL Final    Hemoglobin 04/10/2024 10.3 (L)  14.0 - 18.0 g/dL Final    Hematocrit 04/10/2024 32.6 (L)  40.0 - 54.0 % Final    MCV 04/10/2024 99 (H)  82 - 98 fL Final    MCH 04/10/2024 31.2 (H)  27.0 - 31.0 pg Final    MCHC 04/10/2024 31.6 (L)  32.0 - 36.0 g/dL Final    RDW 04/10/2024 13.0  11.5 - 14.5 % Final    Platelets 04/10/2024 322  150 - 450 K/uL Final    MPV 04/10/2024 9.5  9.2 - 12.9 fL Final    Immature Granulocytes 04/10/2024 0.4  0.0 - 0.5 % Final    Gran # (ANC) 04/10/2024 4.3  1.8 - 7.7 K/uL Final    Immature Grans (Abs) 04/10/2024 0.03  0.00 - 0.04 K/uL Final    Lymph # 04/10/2024 1.6  1.0 - 4.8 K/uL Final    Mono # 04/10/2024 0.8  0.3 - 1.0 K/uL Final    Eos # 04/10/2024 0.1  0.0 - 0.5 K/uL Final     Baso # 04/10/2024 0.04  0.00 - 0.20 K/uL Final    nRBC 04/10/2024 0  0 /100 WBC Final    Gran % 04/10/2024 62.7  38.0 - 73.0 % Final    Lymph % 04/10/2024 23.2  18.0 - 48.0 % Final    Mono % 04/10/2024 11.6  4.0 - 15.0 % Final    Eosinophil % 04/10/2024 1.5  0.0 - 8.0 % Final    Basophil % 04/10/2024 0.6  0.0 - 1.9 % Final    Differential Method 04/10/2024 Automated   Final    Sodium 04/10/2024 139  136 - 145 mmol/L Final    Potassium 04/10/2024 4.7  3.5 - 5.1 mmol/L Final    Chloride 04/10/2024 104  95 - 110 mmol/L Final    CO2 04/10/2024 27  23 - 29 mmol/L Final    Glucose 04/10/2024 110  70 - 110 mg/dL Final    BUN 04/10/2024 22 (H)  6 - 20 mg/dL Final    Creatinine 04/10/2024 1.1  0.5 - 1.4 mg/dL Final    Calcium 04/10/2024 9.6  8.7 - 10.5 mg/dL Final    Total Protein 04/10/2024 6.2  6.0 - 8.4 g/dL Final    Albumin 04/10/2024 2.9 (L)  3.5 - 5.2 g/dL Final    Total Bilirubin 04/10/2024 0.5  0.1 - 1.0 mg/dL Final    Alkaline Phosphatase 04/10/2024 57  55 - 135 U/L Final    AST 04/10/2024 21  10 - 40 U/L Final    ALT 04/10/2024 25  10 - 44 U/L Final    eGFR 04/10/2024 >60  >60 mL/min/1.73 m^2 Final    Anion Gap 04/10/2024 8  8 - 16 mmol/L Final   Lab Visit on 03/27/2024   Component Date Value Ref Range Status    Group & Rh 03/27/2024 A POS   Final    Indirect [place] 03/27/2024 NEG   Final    Specimen Outdate 03/27/2024 04/10/2024 23:59   Final    WBC 03/27/2024 3.87 (L)  3.90 - 12.70 K/uL Final    RBC 03/27/2024 4.52 (L)  4.60 - 6.20 M/uL Final    Hemoglobin 03/27/2024 14.1  14.0 - 18.0 g/dL Final    Hematocrit 03/27/2024 43.9  40.0 - 54.0 % Final    MCV 03/27/2024 97  82 - 98 fL Final    MCH 03/27/2024 31.2 (H)  27.0 - 31.0 pg Final    MCHC 03/27/2024 32.1  32.0 - 36.0 g/dL Final    RDW 03/27/2024 12.9  11.5 - 14.5 % Final    Platelets 03/27/2024 258  150 - 450 K/uL Final    MPV 03/27/2024 10.4  9.2 - 12.9 fL Final    Immature Granulocytes 03/27/2024 0.3  0.0 - 0.5 % Final    Gran # (ANC) 03/27/2024 1.8  1.8 - 7.7  K/uL Final    Immature Grans (Abs) 03/27/2024 0.01  0.00 - 0.04 K/uL Final    Lymph # 03/27/2024 1.5  1.0 - 4.8 K/uL Final    Mono # 03/27/2024 0.5  0.3 - 1.0 K/uL Final    Eos # 03/27/2024 0.1  0.0 - 0.5 K/uL Final    Baso # 03/27/2024 0.05  0.00 - 0.20 K/uL Final    nRBC 03/27/2024 0  0 /100 WBC Final    Gran % 03/27/2024 46.7  38.0 - 73.0 % Final    Lymph % 03/27/2024 38.8  18.0 - 48.0 % Final    Mono % 03/27/2024 11.6  4.0 - 15.0 % Final    Eosinophil % 03/27/2024 1.3  0.0 - 8.0 % Final    Basophil % 03/27/2024 1.3  0.0 - 1.9 % Final    Differential Method 03/27/2024 Automated   Final    Sodium 03/27/2024 139  136 - 145 mmol/L Final    Potassium 03/27/2024 4.4  3.5 - 5.1 mmol/L Final    Chloride 03/27/2024 106  95 - 110 mmol/L Final    CO2 03/27/2024 26  23 - 29 mmol/L Final    Glucose 03/27/2024 100  70 - 110 mg/dL Final    BUN 03/27/2024 20  6 - 20 mg/dL Final    Creatinine 03/27/2024 1.0  0.5 - 1.4 mg/dL Final    Calcium 03/27/2024 9.4  8.7 - 10.5 mg/dL Final    Anion Gap 03/27/2024 7 (L)  8 - 16 mmol/L Final    eGFR 03/27/2024 >60  >60 mL/min/1.73 m^2 Final    MRSA SCREEN BY PCR 03/27/2024 Negative  Negative Final   Office Visit on 12/20/2023   Component Date Value Ref Range Status    WBC 06/10/2024 6.0  3.8 - 10.8 Thousand/uL Final    RBC 06/10/2024 4.50  4.20 - 5.80 Million/uL Final    Hemoglobin 06/10/2024 13.5  13.2 - 17.1 g/dL Final    Hematocrit 06/10/2024 41.6  38.5 - 50.0 % Final    MCV 06/10/2024 92.4  80.0 - 100.0 fL Final    MCH 06/10/2024 30.0  27.0 - 33.0 pg Final    MCHC 06/10/2024 32.5  32.0 - 36.0 g/dL Final    RDW 06/10/2024 12.7  11.0 - 15.0 % Final    Platelets 06/10/2024 315  140 - 400 Thousand/uL Final    MPV 06/10/2024 10.7  7.5 - 12.5 fL Final    Neutrophils, Abs 06/10/2024 3,996  1,500 - 7,800 cells/uL Final    Lymph # 06/10/2024 1,392  850 - 3,900 cells/uL Final    Mono # 06/10/2024 510  200 - 950 cells/uL Final    Eos # 06/10/2024 42  15 - 500 cells/uL Final    Baso # 06/10/2024 60   0 - 200 cells/uL Final    Neutrophils Relative 06/10/2024 66.6  % Final    Lymph % 06/10/2024 23.2  % Final    Mono % 06/10/2024 8.5  % Final    Eosinophil % 06/10/2024 0.7  % Final    Basophil % 06/10/2024 1.0  % Final    Glucose 06/10/2024 91  65 - 99 mg/dL Final    BUN 06/10/2024 16  7 - 25 mg/dL Final    Creatinine 06/10/2024 0.82  0.70 - 1.30 mg/dL Final    eGFR 06/10/2024 101  > OR = 60 mL/min/1.73m2 Final    BUN/Creatinine Ratio 06/10/2024 SEE NOTE:  6 - 22 (calc) Final    Sodium 06/10/2024 140  135 - 146 mmol/L Final    Potassium 06/10/2024 4.5  3.5 - 5.3 mmol/L Final    Chloride 06/10/2024 105  98 - 110 mmol/L Final    CO2 06/10/2024 25  20 - 32 mmol/L Final    Calcium 06/10/2024 9.6  8.6 - 10.3 mg/dL Final    Total Protein 06/10/2024 6.5  6.1 - 8.1 g/dL Final    Albumin 06/10/2024 4.1  3.6 - 5.1 g/dL Final    Globulin, Total 06/10/2024 2.4  1.9 - 3.7 g/dL (calc) Final    Albumin/Globulin Ratio 06/10/2024 1.7  1.0 - 2.5 (calc) Final    Total Bilirubin 06/10/2024 0.5  0.2 - 1.2 mg/dL Final    Alkaline Phosphatase 06/10/2024 72  35 - 144 U/L Final    AST 06/10/2024 22  10 - 35 U/L Final    ALT 06/10/2024 30  9 - 46 U/L Final    Cholesterol 06/10/2024 158  <200 mg/dL Final    HDL 06/10/2024 48  > OR = 40 mg/dL Final    Triglycerides 06/10/2024 102  <150 mg/dL Final    LDL Cholesterol 06/10/2024 90  mg/dL (calc) Final    HDL/Cholesterol Ratio 06/10/2024 3.3  <5.0 (calc) Final    Non HDL Chol. (LDL+VLDL) 06/10/2024 110  <130 mg/dL (calc) Final    TSH w/reflex to FT4 06/10/2024 3.00  0.40 - 4.50 mIU/L Final    Color, UA 06/10/2024 YELLOW  YELLOW Final    Appearance, UA 06/10/2024 CLEAR  CLEAR Final    Specific [place], UA 06/10/2024 1.021  1.001 - 1.035 Final    pH, UA 06/10/2024 8.0  5.0 - 8.0 Final    Glucose, UA 06/10/2024 NEGATIVE  NEGATIVE Final    Bilirubin, UA 06/10/2024 NEGATIVE  NEGATIVE Final    Ketones, UA 06/10/2024 NEGATIVE  NEGATIVE Final    Occult Blood UA 06/10/2024 NEGATIVE  NEGATIVE Final     Protein, UA 06/10/2024 NEGATIVE  NEGATIVE Final    Nitrite, UA 06/10/2024 NEGATIVE  NEGATIVE Final    Leukocytes, UA 06/10/2024 NEGATIVE  NEGATIVE Final       Past Medical History:   Diagnosis Date    Arthritis     Digestive disorder     Hypercholesteremia     Hypertension     Renal disorder     kidney stone     Social History     Socioeconomic History    Marital status:    Tobacco Use    Smoking status: Never    Smokeless tobacco: Never   Substance and Sexual Activity    Alcohol use: Yes     Alcohol/week: 1.0 standard drink of alcohol     Types: 1 Cans of beer per week     Comment: socially    Drug use: Never    Sexual activity: Yes     Partners: Female     Birth control/protection: See Surgical Hx     Social Determinants of Health     Financial Resource Strain: Patient Declined (12/14/2023)    Overall Financial Resource Strain (CARDIA)     Difficulty of Paying Living Expenses: Patient declined   Food Insecurity: Patient Declined (12/14/2023)    Hunger Vital Sign     Worried About Running Out of Food in the Last Year: Patient declined     Ran Out of Food in the Last Year: Patient declined   Transportation Needs: Patient Declined (12/14/2023)    PRAPARE - Transportation     Lack of Transportation (Medical): Patient declined     Lack of Transportation (Non-Medical): Patient declined   Physical Activity: Unknown (12/14/2023)    Exercise Vital Sign     Days of Exercise per Week: Patient declined   Stress: Patient Declined (12/14/2023)    [employer] - Occupational Stress Questionnaire     Feeling of Stress : Patient declined   Housing Stability: Unknown (12/14/2023)    Housing Stability Vital Sign     Unable to Pay for Housing in the Last Year: Patient refused     Unstable Housing in the Last Year: Patient refused     Past Surgical History:   Procedure Laterality Date    COLONOSCOPY  2016    Dr. Chauhan-[facility] 10 years    ROBOTIC ARTHROPLASTY, KNEE Left 4/4/2024    Procedure: ROBOTIC  ARTHROPLASTY, KNEE, TOTAL, LEFT;  Surgeon: Martin Vo MD;  Location: [facility];  Service: Orthopedics;  Laterality: Left;  Suraj-Nithin    VASECTOMY  2012     No family history on file.    The 10-year CVD risk score (BIBIANA'Agoino, et al., 2008) is: 16.5%    Values used to calculate the score:      Age: 59 years      Sex: Male      Diabetic: No      Tobacco smoker: No      Systolic Blood Pressure: 138 mmHg      Is BP treated: Yes      HDL Cholesterol: 48 mg/dL      Total Cholesterol: 158 mg/dL    All of your core healthy metrics are met.      Review of patient's allergies indicates:  No Known Allergies    Current Outpatient Medications:     ascorbic acid, vitamin C, (VITAMIN C) 1000 MG tablet, Take 1,000 mg by mouth once daily., Disp: , Rfl:     atorvastatin (LIPITOR) 20 MG tablet, Take 1 tablet (20 mg total) by mouth once daily. (Patient taking differently: Take 20 mg by mouth once daily. 40MG EVERY OTHER DAY OR 20MG DAILY), Disp: 90 tablet, Rfl: 3    cholecalciferol, vitamin D3, (VITAMIN D3) 25 mcg (1,000 unit) capsule, Take 1,000 Units by mouth once daily., Disp: , Rfl:     cyanocobalamin (VITAMIN B-12) 1000 MCG tablet, Take 100 mcg by mouth once daily., Disp: , Rfl:     flaxseed oil 1,000 mg Cap, Take by mouth., Disp: , Rfl:     ginkgo biloba 60 mg Cap, Take by mouth., Disp: , Rfl:     gluc hull/chondro hull A/vit C/Mn (GLUCOSAMINE 1500 COMPLEX ORAL), Take by mouth., Disp: , Rfl:     HYDROcodone-acetaminophen (NORCO) 5-325 mg per tablet, Take 1 tablet by mouth every 8 (eight) hours as needed for Pain., Disp: 21 tablet, Rfl: 0    ibuprofen (ADVIL,MOTRIN) 800 MG tablet, Take 1 tablet (800 mg total) by mouth 3 (three) times daily., Disp: 90 tablet, Rfl: 2    losartan (COZAAR) 50 MG tablet, Take 1 tablet (50 mg total) by mouth once daily., Disp: 90 tablet, Rfl: 3    meloxicam (MOBIC) 15 MG tablet, Take 1 tablet (15 mg total) by mouth once daily., Disp: 30 tablet, Rfl: 2    multivit-min/FA/lycopen/lutein (CENTRUM  "SILVER MEN ORAL), Take by mouth., Disp: , Rfl:     omega 3-dha-epa-fish oil 1,000 mg (120 mg-180 mg) Cap, Take by mouth., Disp: , Rfl:     TURMERIC ORAL, Take by mouth., Disp: , Rfl:     vitamin E 400 UNIT capsule, Take 400 Units by mouth once daily., Disp: , Rfl:     gabapentin (NEURONTIN) 300 MG capsule, Take 1 capsule (300 mg total) by mouth 2 (two) times daily. Take as directed twice daily after joint replacement surgery as an adjunct to pain medication., Disp: 60 capsule, Rfl: 0    omeprazole (PRILOSEC) 40 MG capsule, Take 1 capsule (40 mg total) by mouth once daily., Disp: 90 capsule, Rfl: 3    propranoloL (INDERAL) 40 MG tablet, Take 2 tablets (80 mg total) by mouth once daily., Disp: 180 tablet, Rfl: 3    temazepam (RESTORIL) 15 mg Cap, Take 1 capsule (15 mg total) by mouth nightly as needed., Disp: 30 capsule, Rfl: 0    Review of Systems   Constitutional:  Negative for activity change and unexpected weight change.   HENT:  Negative for hearing loss, rhinorrhea and trouble swallowing.    Eyes:  Negative for discharge and visual disturbance.   Respiratory:  Negative for chest tightness and wheezing.    Cardiovascular:  Negative for chest pain and palpitations.   Gastrointestinal:  Negative for blood in stool, constipation, diarrhea and vomiting.   Endocrine: Negative for polydipsia and polyuria.   Genitourinary:  Negative for difficulty urinating, hematuria and urgency.   Musculoskeletal:  Positive for arthralgias and gait problem. Negative for joint swelling and neck pain.   Neurological:  Negative for weakness and headaches.   Psychiatric/Behavioral:  Negative for confusion and dysphoric mood.            Objective:      Vitals:    06/19/24 1339   BP: 138/88   Pulse: 78   Weight: 108.4 kg (239 lb)   Height: 5' 10" (1.778 m)     Physical Exam  Vitals and nursing note reviewed.   Constitutional:       General: He is not in acute distress.     Appearance: He is well-developed. He is obese. He is not " toxic-appearing.   HENT:      Head: Normocephalic and atraumatic.      Right Ear: Tympanic membrane and external ear normal.      Left Ear: Tympanic membrane and external ear normal.      Nose: Nose normal.      Mouth/Throat:      Pharynx: Oropharynx is clear.   Eyes:      Pupils: Pupils are equal, round, and reactive to light.   Neck:      Thyroid: No thyromegaly.      Vascular: No carotid bruit.   Cardiovascular:      Rate and Rhythm: Normal rate and regular rhythm.      Heart sounds: Normal heart sounds. No murmur heard.  Pulmonary:      Effort: Pulmonary effort is normal.      Breath sounds: Normal breath sounds. No wheezing or rales.   Abdominal:      General: Bowel sounds are normal. There is no distension.      Palpations: Abdomen is soft.      Tenderness: There is no abdominal tenderness.   Musculoskeletal:         General: No tenderness or deformity. Normal range of motion.      Cervical back: Normal range of motion and neck supple.      Lumbar back: Normal. No spasms.      Comments: Bends 90 degrees at  waist, left TKR incision is healing well.  He has 108 degree flexion.  He walks with a limp.  No pitting edema to lower extremities   Lymphadenopathy:      Cervical: No cervical adenopathy.   Skin:     General: Skin is warm and dry.      Findings: No rash.   Neurological:      Mental Status: He is alert and oriented to person, place, and time. Mental status is at baseline.      Cranial Nerves: No cranial nerve deficit.      Coordination: Coordination normal.      Gait: Gait abnormal (Limping gait).   Psychiatric:         Mood and Affect: Mood normal.         Behavior: Behavior normal.         Thought Content: Thought content normal.         Judgment: Judgment normal.           Assessment:       1. History of left knee replacement    2. Primary insomnia    3. Pure hypercholesterolemia    4. Special screening for malignant neoplasm of prostate    5. Intention tremor    6. Primary hypertension    7.  Nephrolithiasis    8. Gastroesophageal reflux disease without esophagitis    9. Primary osteoarthritis of left knee    10. Primary osteoarthritis of both shoulders         Plan:       History of left knee replacement  Patient is still in physical therapy after his left knee replacement.  Return to work status depends on clearance from Dr. Martin Vo.  Patient has also think about getting in his right TKR done this year also.  He is not physically able to do his job requirements at this time  Primary insomnia  -     temazepam (RESTORIL) 15 mg Cap; Take 1 capsule (15 mg total) by mouth nightly as needed.  Dispense: 30 capsule; Refill: 0  Trial of low-dose temazepam for sleep  Pure hypercholesterolemia  -     Comprehensive Metabolic Panel; Future; Expected date: 06/19/2024  -     Lipid Panel; Future; Expected date: 06/19/2024  Cholesterol excellent at 158 HDL 48  LDL 90  Special screening for malignant neoplasm of prostate  -     Cancel: PSA, Screening; Future; Expected date: 12/13/2024  -     PSA, Screening; Future; Expected date: 12/13/2024  Patient needs a PSA  Intention tremor    Primary hypertension    Nephrolithiasis  Asymptomatic  Gastroesophageal reflux disease without esophagitis    Primary osteoarthritis of left knee    Primary osteoarthritis of both shoulders      Follow up in about 6 months (around 12/19/2024), or htn  hld  OA.        6/25/2024 Titus Graham

## 2024-06-26 ENCOUNTER — OFFICE VISIT (OUTPATIENT)
Dept: ORTHOPEDICS | Facility: CLINIC | Age: 59
End: 2024-06-26
Payer: COMMERCIAL

## 2024-06-26 ENCOUNTER — HOSPITAL ENCOUNTER (OUTPATIENT)
Dept: RADIOLOGY | Facility: HOSPITAL | Age: 59
Discharge: HOME | End: 2024-06-26
Attending: ORTHOPAEDIC SURGERY
Payer: COMMERCIAL

## 2024-06-26 VITALS
HEART RATE: 78 BPM | BODY MASS INDEX: 34.22 KG/M2 | DIASTOLIC BLOOD PRESSURE: 88 MMHG | SYSTOLIC BLOOD PRESSURE: 138 MMHG | HEIGHT: 70 IN | WEIGHT: 239 LBS

## 2024-06-26 DIAGNOSIS — M17.11 PRIMARY OSTEOARTHRITIS OF RIGHT KNEE: ICD-10-CM

## 2024-06-26 DIAGNOSIS — Z96.652 S/P TOTAL KNEE ARTHROPLASTY, LEFT: Primary | ICD-10-CM

## 2024-06-26 PROCEDURE — 73560 X-RAY EXAM OF KNEE 1 OR 2: CPT | Mod: TC,PN,LT

## 2024-06-26 PROCEDURE — 99999 PR PBB SHADOW E&M-EST. PATIENT-LVL IV: CPT | Mod: PBBFAC,,, | Performed by: ORTHOPAEDIC SURGERY

## 2024-06-26 PROCEDURE — 73560 X-RAY EXAM OF KNEE 1 OR 2: CPT | Mod: 26,LT,, | Performed by: RADIOLOGY

## 2024-06-26 RX ORDER — OXAPROZIN 600 MG/1
600 TABLET, FILM COATED ORAL 2 TIMES DAILY WITH MEALS
Qty: 60 TABLET | Refills: 0 | Status: SHIPPED | OUTPATIENT
Start: 2024-06-26 | End: 2024-07-26

## 2024-06-26 NOTE — PROGRESS NOTES
Subjective:    Patient ID: Trino Head is a 59 y.o. male.    Chief Complaint: Pain and Post-op Evaluation of the Left Knee (Patient is here for a PO f/up Left TKA 4.4.2024, states his pain is better than his last visit, still has aches and pains, swelling. Some tenderness in the calf. Takes a 81 mg ASA daily. Need Long Term disability forms completed and discuss Surgery on Right knee. ) and Pain of the Right Knee (States the pain is his Right knee has gotten worse since the last visit, PT aggravates the knee. )      History of Present Illness    Prior to meeting with the patient I reviewed the medical chart in [facility]. This included reviewing the previous progress notes from our office, review of the patient's last appointment with their primary care provider, review of any visits to the emergency room, and review of any pain management appointments or procedures.  Status post left total knee replacement coming along but still having some soreness and stiffness.  Taking meloxicam.  Having pain in his right knee and wants to get the right knee scheduled.  Going to physical therapy.  Not able to return to work which requires a lot of standing and walking and climbing.    Current Medications  Current Outpatient Medications   Medication Sig Dispense Refill    ascorbic acid, vitamin C, (VITAMIN C) 1000 MG tablet Take 1,000 mg by mouth once daily.      atorvastatin (LIPITOR) 20 MG tablet Take 1 tablet (20 mg total) by mouth once daily. (Patient taking differently: Take 20 mg by mouth once daily. 40MG EVERY OTHER DAY OR 20MG DAILY) 90 tablet 3    cholecalciferol, vitamin D3, (VITAMIN D3) 25 mcg (1,000 unit) capsule Take 1,000 Units by mouth once daily.      cyanocobalamin (VITAMIN B-12) 1000 MCG tablet Take 100 mcg by mouth once daily.      flaxseed oil 1,000 mg Cap Take by mouth.      ginkgo biloba 60 mg Cap Take by mouth.      gluc hull/chondro hull A/vit C/Mn (GLUCOSAMINE 1500 COMPLEX ORAL) Take by mouth.       HYDROcodone-acetaminophen (NORCO) 5-325 mg per tablet Take 1 tablet by mouth every 8 (eight) hours as needed for Pain. 21 tablet 0    ibuprofen (ADVIL,MOTRIN) 800 MG tablet Take 1 tablet (800 mg total) by mouth 3 (three) times daily. 90 tablet 2    losartan (COZAAR) 50 MG tablet Take 1 tablet (50 mg total) by mouth once daily. 90 tablet 3    multivit-min/FA/lycopen/lutein (CENTRUM SILVER MEN ORAL) Take by mouth.      omega 3-dha-epa-fish oil 1,000 mg (120 mg-180 mg) Cap Take by mouth.      omeprazole (PRILOSEC) 40 MG capsule Take 1 capsule (40 mg total) by mouth once daily. 90 capsule 3    propranoloL (INDERAL) 40 MG tablet Take 2 tablets (80 mg total) by mouth once daily. 180 tablet 3    temazepam (RESTORIL) 15 mg Cap Take 1 capsule (15 mg total) by mouth nightly as needed. 30 capsule 0    TURMERIC ORAL Take by mouth.      vitamin E 400 UNIT capsule Take 400 Units by mouth once daily.      gabapentin (NEURONTIN) 300 MG capsule Take 1 capsule (300 mg total) by mouth 2 (two) times daily. Take as directed twice daily after joint replacement surgery as an adjunct to pain medication. (Patient not taking: Reported on 6/26/2024) 60 capsule 0    oxaprozin (DAYPRO) 600 mg tablet Take 1 tablet (600 mg total) by mouth 2 (two) times daily with meals. 60 tablet 0     No current facility-administered medications for this visit.       Allergies  Review of patient's allergies indicates:  No Known Allergies    Past Medical History  Past Medical History:   Diagnosis Date    Arthritis     Digestive disorder     Hypercholesteremia     Hypertension     Renal disorder     kidney stone       Surgical History  Past Surgical History:   Procedure Laterality Date    [facility]    ROBOTIC ARTHROPLASTY, KNEE Left 4/4/2024    Procedure: ROBOTIC ARTHROPLASTY, KNEE, TOTAL, LEFT;  Surgeon: Martin Vo MD;  Location: [facility];  Service: Orthopedics;  Laterality: Left;  [place]    VASECTOMY  2012        Family History:   No family history on file.    Social History:   Social History     Socioeconomic History    Marital status:    Tobacco Use    Smoking status: Never    Smokeless tobacco: Never   Substance and Sexual Activity    Alcohol use: Yes     Alcohol/week: 1.0 standard drink of alcohol     Types: 1 Cans of beer per week     Comment: socially    Drug use: Never    Sexual activity: Yes     Partners: Female     Birth control/protection: See Surgical Hx     Social Determinants of Health     Financial Resource Strain: Patient Declined (12/14/2023)    Overall Financial Resource Strain (CARDIA)     Difficulty of Paying Living Expenses: Patient declined   Food Insecurity: Patient Declined (12/14/2023)    Hunger Vital Sign     Worried About Running Out of Food in the Last Year: Patient declined     Ran Out of Food in the Last Year: Patient declined   Transportation Needs: Patient Declined (12/14/2023)    PRAPARE - Transportation     Lack of Transportation (Medical): Patient declined     Lack of Transportation (Non-Medical): Patient declined   Physical Activity: Unknown (12/14/2023)    Exercise Vital Sign     Days of Exercise per Week: Patient declined   Stress: Patient Declined (12/14/2023)    [employer] [place] [employer] - Occupational Stress Questionnaire     Feeling of Stress : Patient declined   Housing Stability: Unknown (12/14/2023)    Housing Stability Vital Sign     Unable to Pay for Housing in the Last Year: Patient refused     Unstable Housing in the Last Year: Patient refused       Date of surgery:  Left knee      Review of Systems     General/Constitutional: Chills denies. Fatigue denies. Fever denies. Weight gain denies. Weight loss denies.    Musculoskeletal: Comments: See HPI for details    Skin: Rash denies.    Objective:   Vital Signs:   Vitals:    06/26/24 1144   BP: 138/88   Pulse: 78        Physical Exam    This a well-developed, well nourished patient in no acute distress.  " They are alert and oriented and cooperative to examination.  Pt. walks without an antalgic gait.      General Examination:     Constitutional: The patient is alert and oriented to lace person and time. Mood is pleasant.     Head/Face: Normal facial features normal eyebrows    Eyes: Normal extraocular motion bilaterally    Lungs: Respirations are equal and unlabored    Gait is coordinated.    Cardiovascular: There are no swelling or varicosities present.    Lymphatic: Negative for adenopathy    Skin: Normal    Neurological: Level of consciousness normal. Oriented to place person and time and situation    Psychiatric: Oriented to time place person and situation     left knee incision well healed flexion 0-90 degrees right knee tibia vera tenderness of the medial joint line crepitus limited range motion  XRAY Report/ Interpretation:  AP lateral x-rays left knee were taken today total knee replacement intact medial compartment osteoarthritis joint space narrowing sclerosis and osteophyte formation right knee      Assessment:       1. S/P total knee arthroplasty, left    2. Primary osteoarthritis of right knee        Plan:       Trino Lucero" was seen today for pain, post-op evaluation and pain.    Diagnoses and all orders for this visit:    S/P total knee arthroplasty, left  -     X-Ray Knee 1 or 2 View Left    Primary osteoarthritis of right knee    Other orders  -     oxaprozin (DAYPRO) 600 mg tablet; Take 1 tablet (600 mg total) by mouth 2 (two) times daily with meals.         Follow up for 3 week f/u - left TKA 4/4/24.    Continue physical therapy left knee return 3 weeks if improving consider scheduling right total knee arthroplasty not able return to work at this time      Treatment options were discussed with regards to the nature of the medical condition. Conservative pain intervention and surgical options were discussed in detail. The probability of success of each separate treatment option was discussed. The " patient expressed a clear understanding of the treatment options. With regards to surgery, the procedure risk, benefits, complications, and outcomes were discussed. No guarantees were given with regards to surgical outcome.   The risk of complications, morbidity, and mortality of patient management decisions have been made at the time of this visit. These are associated with the patient's problems, diagnostic procedures and treatment options. This includes the possible management options selected and those considered but not selected by the patient after shared medical decision making we discussed with the patient.     This note was created using Dragon voice recognition software that occasionally misinterpreted phrases or words.

## 2024-07-09 ENCOUNTER — PATIENT MESSAGE (OUTPATIENT)
Dept: ORTHOPEDICS | Facility: CLINIC | Age: 59
End: 2024-07-09
Payer: COMMERCIAL

## 2024-07-12 DIAGNOSIS — Z96.652 S/P TOTAL KNEE ARTHROPLASTY, LEFT: ICD-10-CM

## 2024-07-12 RX ORDER — HYDROCODONE BITARTRATE AND ACETAMINOPHEN 5; 325 MG/1; MG/1
1 TABLET ORAL EVERY 12 HOURS PRN
Qty: 14 TABLET | Refills: 0 | Status: SHIPPED | OUTPATIENT
Start: 2024-07-12

## 2024-07-19 ENCOUNTER — TELEPHONE (OUTPATIENT)
Dept: FAMILY MEDICINE | Facility: CLINIC | Age: 59
End: 2024-07-19
Payer: COMMERCIAL

## 2024-07-22 ENCOUNTER — PATIENT MESSAGE (OUTPATIENT)
Dept: ORTHOPEDICS | Facility: CLINIC | Age: 59
End: 2024-07-22
Payer: COMMERCIAL

## 2024-07-22 ENCOUNTER — OFFICE VISIT (OUTPATIENT)
Dept: ORTHOPEDICS | Facility: CLINIC | Age: 59
End: 2024-07-22
Payer: COMMERCIAL

## 2024-07-22 VITALS
HEIGHT: 70 IN | WEIGHT: 239 LBS | BODY MASS INDEX: 34.22 KG/M2 | DIASTOLIC BLOOD PRESSURE: 78 MMHG | SYSTOLIC BLOOD PRESSURE: 130 MMHG

## 2024-07-22 DIAGNOSIS — M17.11 PRIMARY OSTEOARTHRITIS OF RIGHT KNEE: Primary | ICD-10-CM

## 2024-07-22 DIAGNOSIS — Z96.652 HISTORY OF TOTAL KNEE REPLACEMENT, LEFT: ICD-10-CM

## 2024-07-22 PROCEDURE — 99213 OFFICE O/P EST LOW 20 MIN: CPT | Mod: S$GLB,,, | Performed by: ORTHOPAEDIC SURGERY

## 2024-07-22 PROCEDURE — 3008F BODY MASS INDEX DOCD: CPT | Mod: CPTII,S$GLB,, | Performed by: ORTHOPAEDIC SURGERY

## 2024-07-22 PROCEDURE — 4010F ACE/ARB THERAPY RXD/TAKEN: CPT | Mod: CPTII,S$GLB,, | Performed by: ORTHOPAEDIC SURGERY

## 2024-07-22 PROCEDURE — 1159F MED LIST DOCD IN RCRD: CPT | Mod: CPTII,S$GLB,, | Performed by: ORTHOPAEDIC SURGERY

## 2024-07-22 PROCEDURE — 1160F RVW MEDS BY RX/DR IN RCRD: CPT | Mod: CPTII,S$GLB,, | Performed by: ORTHOPAEDIC SURGERY

## 2024-07-22 PROCEDURE — 3078F DIAST BP <80 MM HG: CPT | Mod: CPTII,S$GLB,, | Performed by: ORTHOPAEDIC SURGERY

## 2024-07-22 PROCEDURE — 3075F SYST BP GE 130 - 139MM HG: CPT | Mod: CPTII,S$GLB,, | Performed by: ORTHOPAEDIC SURGERY

## 2024-07-22 PROCEDURE — 99999 PR PBB SHADOW E&M-EST. PATIENT-LVL IV: CPT | Mod: PBBFAC,,, | Performed by: ORTHOPAEDIC SURGERY

## 2024-07-22 NOTE — PROGRESS NOTES
Subjective:       Patient ID: Trino Head is a 59 y.o. male.    Chief Complaint: Pain of the Left Knee (Patient is here for a 3 week f/u - left TKA 4/4/24, states his pain is the same as his last visit, but better than before surgery, Has completed all the PT that insurance would cover. Would like to discus surgery on Right knee, pain is about the same as his last visit for the right knee) and Pain of the Right Knee      History of Present Illness    Prior to meeting with the patient I reviewed the medical chart in [facility]. This included reviewing the previous progress notes from our office, review of the patient's last appointment with their primary care provider, review of any visits to the emergency room, and review of any pain management appointments or procedures.   59-year-old male, returns to clinic today status post left total knee arthroplasty done April 4th of this year.  Initially struggled with range of motion.  He some limited flexion.  He is now 0 to 90/95 degrees.  Unfortunately, he has exhausted all of his physical therapy visits for the year for the left knee.  He has not had any formal physical therapy now for a few weeks but he has been participating in home exercise program.    He is interested in proceeding with right total knee arthroplasty.  Patient with known osteoarthritis of the right knee with bone-on-bone deformity of the medial compartment.    Current Medications  Current Outpatient Medications   Medication Sig Dispense Refill    ascorbic acid, vitamin C, (VITAMIN C) 1000 MG tablet Take 1,000 mg by mouth once daily.      atorvastatin (LIPITOR) 20 MG tablet Take 1 tablet (20 mg total) by mouth once daily. (Patient taking differently: Take 20 mg by mouth once daily. 40MG EVERY OTHER DAY OR 20MG DAILY) 90 tablet 3    cholecalciferol, vitamin D3, (VITAMIN D3) 25 mcg (1,000 unit) capsule Take 1,000 Units by mouth once daily.      cyanocobalamin (VITAMIN B-12) 1000 MCG tablet Take 100 mcg by  mouth once daily.      flaxseed oil 1,000 mg Cap Take by mouth.      ginkgo biloba 60 mg Cap Take by mouth.      gluc hlul/chondro hull A/vit C/Mn (GLUCOSAMINE 1500 COMPLEX ORAL) Take by mouth.      HYDROcodone-acetaminophen (NORCO) 5-325 mg per tablet Take 1 tablet by mouth every 12 (twelve) hours as needed for Pain. 14 tablet 0    ibuprofen (ADVIL,MOTRIN) 800 MG tablet Take 1 tablet (800 mg total) by mouth 3 (three) times daily. 90 tablet 2    losartan (COZAAR) 50 MG tablet Take 1 tablet (50 mg total) by mouth once daily. 90 tablet 3    multivit-min/FA/lycopen/lutein (CENTRUM SILVER MEN ORAL) Take by mouth.      omega 3-dha-epa-fish oil 1,000 mg (120 mg-180 mg) Cap Take by mouth.      omeprazole (PRILOSEC) 40 MG capsule Take 1 capsule (40 mg total) by mouth once daily. 90 capsule 3    oxaprozin (DAYPRO) 600 mg tablet Take 1 tablet (600 mg total) by mouth 2 (two) times daily with meals. 60 tablet 0    propranoloL (INDERAL) 40 MG tablet Take 2 tablets (80 mg total) by mouth once daily. 180 tablet 3    TURMERIC ORAL Take by mouth.      vitamin E 400 UNIT capsule Take 400 Units by mouth once daily.       No current facility-administered medications for this visit.       Allergies  Review of patient's allergies indicates:  No Known Allergies    Past Medical History  Past Medical History:   Diagnosis Date    Arthritis     Digestive disorder     Hypercholesteremia     Hypertension     Renal disorder     kidney stone       Surgical History  Past Surgical History:   Procedure Laterality Date    COLONOSCOPY  2016    Dr. [facility] 10 years    ROBOTIC ARTHROPLASTY, KNEE Left 4/4/2024    Procedure: ROBOTIC ARTHROPLASTY, KNEE, TOTAL, LEFT;  Surgeon: Martin Vo MD;  Location: [facility];  Service: Orthopedics;  Laterality: Left;  [place]    VASECTOMY  2012       Family History:   No family history on file.    Social History:   Social History     Socioeconomic History    Marital status:    Tobacco Use    Smoking  status: Never    Smokeless tobacco: Never   Substance and Sexual Activity    Alcohol use: Yes     Alcohol/week: 1.0 standard drink of alcohol     Types: 1 Cans of beer per week     Comment: socially    Drug use: Never    Sexual activity: Yes     Partners: Female     Birth control/protection: See Surgical Hx     Social Determinants of Health     Financial Resource Strain: Patient Declined (12/14/2023)    Overall Financial Resource Strain (CARDIA)     Difficulty of Paying Living Expenses: Patient declined   Food Insecurity: Patient Declined (12/14/2023)    Hunger Vital Sign     Worried About Running Out of Food in the Last Year: Patient declined     Ran Out of Food in the Last Year: Patient declined   Transportation Needs: Patient Declined (12/14/2023)    PRAPARE - Transportation     Lack of Transportation (Medical): Patient declined     Lack of Transportation (Non-Medical): Patient declined   Physical Activity: Unknown (12/14/2023)    Exercise Vital Sign     Days of Exercise per Week: Patient declined   Stress: Patient Declined (12/14/2023)    [employer] [place] [employer] - Occupational Stress Questionnaire     Feeling of Stress : Patient declined   Housing Stability: Unknown (12/14/2023)    Housing Stability Vital Sign     Unable to Pay for Housing in the Last Year: Patient refused     Unstable Housing in the Last Year: Patient refused       Hospitalization/Major Diagnostic Procedure:     Review of Systems     General/Constitutional:  Chills denies. Fatigue denies. Fever denies. Weight gain denies. Weight loss denies.    Respiratory:  Shortness of breath denies.    Cardiovascular:  Chest pain denies.    Gastrointestinal:  Constipation denies. Diarrhea denies. Nausea denies. Vomiting denies.     Hematology:  Easy bruising denies. Prolonged bleeding denies.     Genitourinary:  Frequent urination denies. Pain in lower back denies. Painful urination denies.     Musculoskeletal:  See HPI for details    Skin:   "Rash denies.    Neurologic:  Dizziness denies. Gait abnormalities denies. Seizures denies. Tingling/Numbess denies.    Psychiatric:  Anxiety denies. Depressed mood denies.     Objective:   Vital Signs:   Vitals:    07/22/24 1337   BP: 130/78        Physical Exam      General Examination:     Constitutional: The patient is alert and oriented to lace person and time. Mood is pleasant.     Head/Face: Normal facial features normal eyebrows    Eyes: Normal extraocular motion bilaterally    Lungs: Respirations are equal and unlabored    Gait is coordinated.    Cardiovascular: There are no swelling or varicosities present.    Lymphatic: Negative for adenopathy    Skin: Normal    Neurological: Level of consciousness normal. Oriented to place person and time and situation    Psychiatric: Oriented to time place person and situation    Examination of the left knee, the patient still has some joint hypertrophy, he is also got some mild scar hypertrophy.  Otherwise surgical wounds are well healed.  No erythema ecchymosis no signs symptoms of infection no evidence of wound failure dehiscence.  Range of motion 0°, he can flex to 95°.  Stable in flexion and extension.  Homans sign is negative.  Straight leg raise is negative.    Examination of the right knee, skin is dry and intact, no erythema or ecchymosis no signs symptoms of infection no effusion.  Range of motion 0-110 degrees.  Stable anterior-posterior varus and valgus stress.  Homans signs negative, straight leg raise negative.    XRAY Report/ Interpretation:  Prior x-rays were reviewed showing moderate osteoarthritis of the right knee with joint space narrowing and osteophyte formation      Assessment:       1. History of total knee replacement, left    2. Primary osteoarthritis of right knee        Plan:       Trino Lucero" was seen today for pain and pain.    Diagnoses and all orders for this visit:    History of total knee replacement, left    Primary osteoarthritis of " right knee         No follow-ups on file.  This is to attest that the physician's assistant Joselo Hernandez served in the capacity as a scribe for this patient's encounter.  This is also to verify that I have reviewed the patient's history and helped formulate the treatment plan as discussed in the report this treatment plan was discussed with the physician assistant.  It is outlined below  59-year-old male with known osteoarthritis in his right knee.  He has been rehabilitating his left knee.  He underwent left total knee arthroplasty now approximately 3 months ago.  He is in the process of transitioning from his short-term to his long-term disability.  Unfortunately he is unable to do his job and we will more than likely require 3 more months of rehabilitation for his right knee.  He has got advanced medial compartment arthritis with bone-on-bone deformity of the right knee.  Plan would be to proceed with right total knee arthroplasty.  Risks and benefits were discussed with the patient and his significant other in great detail.    Patient was consented for surgery. Risks and benefits of the procedure were discussed in great detail to include the expected preoperative, intraoperative and postoperative courses. Complications may include but are not limited to bleeding, infection, damage to nerves, arteries, blood vessels, bones, tendons, ligaments, stiffness, instability, deep vein thrombus (DVT), pulmonary embolus (PE), altered sensation, continued pain, RSD, loss of motion or a need for additional surgery. As well as general anesthetic complications including seizure, stroke, heart attack and even death.    Treatment options were discussed with regards to the nature of the medical condition. Conservative pain intervention and surgical options were discussed in detail. The probability of success of each separate treatment option was discussed. The patient expressed a clear understanding of the treatment options.  With regards to surgery, the procedure risk, benefits, complications, and outcomes were discussed. No guarantees were given with regards to surgical outcome.   The risk of complications, morbidity, and mortality of patient management decisions have been made at the time of this visit. These are associated with the patient's problems, diagnostic procedures and treatment options. This includes the possible management options selected and those considered but not selected by the patient after shared medical decision making we discussed with the patient.     This note was created using Dragon voice recognition software that occasionally misinterpreted phrases or words.

## 2024-07-22 NOTE — H&P (VIEW-ONLY)
Subjective:       Patient ID: Trino Head is a 59 y.o. male.    Chief Complaint: Pain of the Left Knee (Patient is here for a 3 week f/u - left TKA 4/4/24, states his pain is the same as his last visit, but better than before surgery, Has completed all the PT that insurance would cover. Would like to discus surgery on Right knee, pain is about the same as his last visit for the right knee) and Pain of the Right Knee      History of Present Illness    Prior to meeting with the patient I reviewed the medical chart in [facility]. This included reviewing the previous progress notes from our office, review of the patient's last appointment with their primary care provider, review of any visits to the emergency room, and review of any pain management appointments or procedures.   59-year-old male, returns to clinic today status post left total knee arthroplasty done April 4th of this year.  Initially struggled with range of motion.  He some limited flexion.  He is now 0 to 90/95 degrees.  Unfortunately, he has exhausted all of his physical therapy visits for the year for the left knee.  He has not had any formal physical therapy now for a few weeks but he has been participating in home exercise program.    He is interested in proceeding with right total knee arthroplasty.  Patient with known osteoarthritis of the right knee with bone-on-bone deformity of the medial compartment.    Current Medications  Current Outpatient Medications   Medication Sig Dispense Refill    ascorbic acid, vitamin C, (VITAMIN C) 1000 MG tablet Take 1,000 mg by mouth once daily.      atorvastatin (LIPITOR) 20 MG tablet Take 1 tablet (20 mg total) by mouth once daily. (Patient taking differently: Take 20 mg by mouth once daily. 40MG EVERY OTHER DAY OR 20MG DAILY) 90 tablet 3    cholecalciferol, vitamin D3, (VITAMIN D3) 25 mcg (1,000 unit) capsule Take 1,000 Units by mouth once daily.      cyanocobalamin (VITAMIN B-12) 1000 MCG tablet Take 100 mcg by  mouth once daily.      flaxseed oil 1,000 mg Cap Take by mouth.      ginkgo biloba 60 mg Cap Take by mouth.      gluc hull/chondro hull A/vit C/Mn (GLUCOSAMINE 1500 COMPLEX ORAL) Take by mouth.      HYDROcodone-acetaminophen (NORCO) 5-325 mg per tablet Take 1 tablet by mouth every 12 (twelve) hours as needed for Pain. 14 tablet 0    ibuprofen (ADVIL,MOTRIN) 800 MG tablet Take 1 tablet (800 mg total) by mouth 3 (three) times daily. 90 tablet 2    losartan (COZAAR) 50 MG tablet Take 1 tablet (50 mg total) by mouth once daily. 90 tablet 3    multivit-min/FA/lycopen/lutein (CENTRUM SILVER MEN ORAL) Take by mouth.      omega 3-dha-epa-fish oil 1,000 mg (120 mg-180 mg) Cap Take by mouth.      omeprazole (PRILOSEC) 40 MG capsule Take 1 capsule (40 mg total) by mouth once daily. 90 capsule 3    oxaprozin (DAYPRO) 600 mg tablet Take 1 tablet (600 mg total) by mouth 2 (two) times daily with meals. 60 tablet 0    propranoloL (INDERAL) 40 MG tablet Take 2 tablets (80 mg total) by mouth once daily. 180 tablet 3    TURMERIC ORAL Take by mouth.      vitamin E 400 UNIT capsule Take 400 Units by mouth once daily.       No current facility-administered medications for this visit.       Allergies  Review of patient's allergies indicates:  No Known Allergies    Past Medical History  Past Medical History:   Diagnosis Date    Arthritis     Digestive disorder     Hypercholesteremia     Hypertension     Renal disorder     kidney stone       Surgical History  Past Surgical History:   Procedure Laterality Date    COLONOSCOPY  2016    Dr. [facility]han[facility] 10 years    ROBOTIC ARTHROPLASTY, KNEE Left 4/4/2024    Procedure: ROBOTIC ARTHROPLASTY, KNEE, TOTAL, LEFT;  Surgeon: Martin Vo MD;  Location: [facility];  Service: Orthopedics;  Laterality: Left;  Suraj-Nithin    VASECTOMY  2012       Family History:   No family history on file.    Social History:   Social History     Socioeconomic History    Marital status:    Tobacco Use    Smoking  status: Never    Smokeless tobacco: Never   Substance and Sexual Activity    Alcohol use: Yes     Alcohol/week: 1.0 standard drink of alcohol     Types: 1 Cans of beer per week     Comment: socially    Drug use: Never    Sexual activity: Yes     Partners: Female     Birth control/protection: See Surgical Hx     Social Determinants of Health     Financial Resource Strain: Patient Declined (12/14/2023)    Overall Financial Resource Strain (CARDIA)     Difficulty of Paying Living Expenses: Patient declined   Food Insecurity: Patient Declined (12/14/2023)    Hunger Vital Sign     Worried About Running Out of Food in the Last Year: Patient declined     Ran Out of Food in the Last Year: Patient declined   Transportation Needs: Patient Declined (12/14/2023)    PRAPARE - Transportation     Lack of Transportation (Medical): Patient declined     Lack of Transportation (Non-Medical): Patient declined   Physical Activity: Unknown (12/14/2023)    Exercise Vital Sign     Days of Exercise per Week: Patient declined   Stress: Patient Declined (12/14/2023)    [employer] [place] [employer] - Occupational Stress Questionnaire     Feeling of Stress : Patient declined   Housing Stability: Unknown (12/14/2023)    Housing Stability Vital Sign     Unable to Pay for Housing in the Last Year: Patient refused     Unstable Housing in the Last Year: Patient refused       Hospitalization/Major Diagnostic Procedure:     Review of Systems     General/Constitutional:  Chills denies. Fatigue denies. Fever denies. Weight gain denies. Weight loss denies.    Respiratory:  Shortness of breath denies.    Cardiovascular:  Chest pain denies.    Gastrointestinal:  Constipation denies. Diarrhea denies. Nausea denies. Vomiting denies.     Hematology:  Easy bruising denies. Prolonged bleeding denies.     Genitourinary:  Frequent urination denies. Pain in lower back denies. Painful urination denies.     Musculoskeletal:  See HPI for details    Skin:   "Rash denies.    Neurologic:  Dizziness denies. Gait abnormalities denies. Seizures denies. Tingling/Numbess denies.    Psychiatric:  Anxiety denies. Depressed mood denies.     Objective:   Vital Signs:   Vitals:    07/22/24 1337   BP: 130/78        Physical Exam      General Examination:     Constitutional: The patient is alert and oriented to lace person and time. Mood is pleasant.     Head/Face: Normal facial features normal eyebrows    Eyes: Normal extraocular motion bilaterally    Lungs: Respirations are equal and unlabored    Gait is coordinated.    Cardiovascular: There are no swelling or varicosities present.    Lymphatic: Negative for adenopathy    Skin: Normal    Neurological: Level of consciousness normal. Oriented to place person and time and situation    Psychiatric: Oriented to time place person and situation    Examination of the left knee, the patient still has some joint hypertrophy, he is also got some mild scar hypertrophy.  Otherwise surgical wounds are well healed.  No erythema ecchymosis no signs symptoms of infection no evidence of wound failure dehiscence.  Range of motion 0°, he can flex to 95°.  Stable in flexion and extension.  Homans sign is negative.  Straight leg raise is negative.    Examination of the right knee, skin is dry and intact, no erythema or ecchymosis no signs symptoms of infection no effusion.  Range of motion 0-110 degrees.  Stable anterior-posterior varus and valgus stress.  Homans signs negative, straight leg raise negative.    XRAY Report/ Interpretation:  Prior x-rays were reviewed showing moderate osteoarthritis of the right knee with joint space narrowing and osteophyte formation      Assessment:       1. History of total knee replacement, left    2. Primary osteoarthritis of right knee        Plan:       Trino Lucero" was seen today for pain and pain.    Diagnoses and all orders for this visit:    History of total knee replacement, left    Primary osteoarthritis of " right knee         No follow-ups on file.  This is to attest that the physician's assistant Joselo Hernandez served in the capacity as a scribe for this patient's encounter.  This is also to verify that I have reviewed the patient's history and helped formulate the treatment plan as discussed in the report this treatment plan was discussed with the physician assistant.  It is outlined below  59-year-old male with known osteoarthritis in his right knee.  He has been rehabilitating his left knee.  He underwent left total knee arthroplasty now approximately 3 months ago.  He is in the process of transitioning from his short-term to his long-term disability.  Unfortunately he is unable to do his job and we will more than likely require 3 more months of rehabilitation for his right knee.  He has got advanced medial compartment arthritis with bone-on-bone deformity of the right knee.  Plan would be to proceed with right total knee arthroplasty.  Risks and benefits were discussed with the patient and his significant other in great detail.    Patient was consented for surgery. Risks and benefits of the procedure were discussed in great detail to include the expected preoperative, intraoperative and postoperative courses. Complications may include but are not limited to bleeding, infection, damage to nerves, arteries, blood vessels, bones, tendons, ligaments, stiffness, instability, deep vein thrombus (DVT), pulmonary embolus (PE), altered sensation, continued pain, RSD, loss of motion or a need for additional surgery. As well as general anesthetic complications including seizure, stroke, heart attack and even death.    Treatment options were discussed with regards to the nature of the medical condition. Conservative pain intervention and surgical options were discussed in detail. The probability of success of each separate treatment option was discussed. The patient expressed a clear understanding of the treatment options.  With regards to surgery, the procedure risk, benefits, complications, and outcomes were discussed. No guarantees were given with regards to surgical outcome.   The risk of complications, morbidity, and mortality of patient management decisions have been made at the time of this visit. These are associated with the patient's problems, diagnostic procedures and treatment options. This includes the possible management options selected and those considered but not selected by the patient after shared medical decision making we discussed with the patient.     This note was created using Dragon voice recognition software that occasionally misinterpreted phrases or words.

## 2024-07-23 DIAGNOSIS — Z01.818 PRE-OP TESTING: ICD-10-CM

## 2024-07-23 DIAGNOSIS — M17.11 PRIMARY OSTEOARTHRITIS OF RIGHT KNEE: Primary | ICD-10-CM

## 2024-07-23 RX ORDER — CEFAZOLIN SODIUM 2 G/50ML
2 SOLUTION INTRAVENOUS
OUTPATIENT
Start: 2024-07-23

## 2024-07-24 ENCOUNTER — PATIENT MESSAGE (OUTPATIENT)
Dept: ORTHOPEDICS | Facility: CLINIC | Age: 59
End: 2024-07-24
Payer: COMMERCIAL

## 2024-07-29 DIAGNOSIS — Z96.652 S/P TOTAL KNEE ARTHROPLASTY, LEFT: ICD-10-CM

## 2024-07-29 DIAGNOSIS — K21.9 GASTROESOPHAGEAL REFLUX DISEASE WITHOUT ESOPHAGITIS: ICD-10-CM

## 2024-07-29 RX ORDER — HYDROCODONE BITARTRATE AND ACETAMINOPHEN 5; 325 MG/1; MG/1
1 TABLET ORAL EVERY 12 HOURS PRN
Qty: 14 TABLET | Refills: 0 | Status: SHIPPED | OUTPATIENT
Start: 2024-07-29

## 2024-07-29 RX ORDER — OMEPRAZOLE 40 MG/1
40 CAPSULE, DELAYED RELEASE ORAL DAILY
Qty: 90 CAPSULE | Refills: 3 | Status: SHIPPED | OUTPATIENT
Start: 2024-07-29 | End: 2025-07-29

## 2024-08-01 ENCOUNTER — HOSPITAL ENCOUNTER (OUTPATIENT)
Dept: RADIOLOGY | Facility: HOSPITAL | Age: 59
Discharge: HOME | End: 2024-08-01
Attending: ORTHOPAEDIC SURGERY
Payer: COMMERCIAL

## 2024-08-01 ENCOUNTER — HOSPITAL ENCOUNTER (OUTPATIENT)
Dept: PREADMISSION TESTING | Facility: HOSPITAL | Age: 59
Discharge: HOME | End: 2024-08-01
Attending: ORTHOPAEDIC SURGERY
Payer: COMMERCIAL

## 2024-08-01 DIAGNOSIS — Z01.818 PREOP TESTING: Primary | ICD-10-CM

## 2024-08-01 DIAGNOSIS — M17.11 PRIMARY OSTEOARTHRITIS OF RIGHT KNEE: ICD-10-CM

## 2024-08-01 DIAGNOSIS — Z01.818 PRE-OP TESTING: ICD-10-CM

## 2024-08-01 DIAGNOSIS — Z01.818 PREOP TESTING: ICD-10-CM

## 2024-08-01 LAB
ANION GAP SERPL CALC-SCNC: 8 MMOL/L (ref 8–16)
BASOPHILS # BLD AUTO: 0.07 K/UL (ref 0–0.2)
BASOPHILS NFR BLD: 1.3 % (ref 0–1.9)
BUN SERPL-MCNC: 17 MG/DL (ref 6–20)
CALCIUM SERPL-MCNC: 9.5 MG/DL (ref 8.7–10.5)
CHLORIDE SERPL-SCNC: 106 MMOL/L (ref 95–110)
CO2 SERPL-SCNC: 26 MMOL/L (ref 23–29)
CREAT SERPL-MCNC: 0.9 MG/DL (ref 0.5–1.4)
DIFFERENTIAL METHOD BLD: ABNORMAL
EOSINOPHIL # BLD AUTO: 0.1 K/UL (ref 0–0.5)
EOSINOPHIL NFR BLD: 1.3 % (ref 0–8)
ERYTHROCYTE [DISTWIDTH] IN BLOOD BY AUTOMATED COUNT: 14.9 % (ref 11.5–14.5)
EST. GFR  (NO RACE VARIABLE): >60 ML/MIN/1.73 M^2
GLUCOSE SERPL-MCNC: 100 MG/DL (ref 70–110)
HCT VFR BLD AUTO: 43.6 % (ref 40–54)
HGB BLD-MCNC: 13.9 G/DL (ref 14–18)
IMM GRANULOCYTES # BLD AUTO: 0.01 K/UL (ref 0–0.04)
IMM GRANULOCYTES NFR BLD AUTO: 0.2 % (ref 0–0.5)
LYMPHOCYTES # BLD AUTO: 1.8 K/UL (ref 1–4.8)
LYMPHOCYTES NFR BLD: 31.9 % (ref 18–48)
MCH RBC QN AUTO: 28.7 PG (ref 27–31)
MCHC RBC AUTO-ENTMCNC: 31.9 G/DL (ref 32–36)
MCV RBC AUTO: 90 FL (ref 82–98)
MONOCYTES # BLD AUTO: 0.6 K/UL (ref 0.3–1)
MONOCYTES NFR BLD: 10.5 % (ref 4–15)
MRSA SCREEN BY PCR: NEGATIVE
NEUTROPHILS # BLD AUTO: 3 K/UL (ref 1.8–7.7)
NEUTROPHILS NFR BLD: 54.8 % (ref 38–73)
NRBC BLD-RTO: 0 /100 WBC
PLATELET # BLD AUTO: 309 K/UL (ref 150–450)
PMV BLD AUTO: 10.3 FL (ref 9.2–12.9)
POTASSIUM SERPL-SCNC: 4.4 MMOL/L (ref 3.5–5.1)
RBC # BLD AUTO: 4.85 M/UL (ref 4.6–6.2)
SODIUM SERPL-SCNC: 140 MMOL/L (ref 136–145)
WBC # BLD AUTO: 5.52 K/UL (ref 3.9–12.7)

## 2024-08-01 PROCEDURE — 93010 ELECTROCARDIOGRAM REPORT: CPT | Mod: ,,, | Performed by: INTERNAL MEDICINE

## 2024-08-01 PROCEDURE — 73700 CT LOWER EXTREMITY W/O DYE: CPT | Mod: 26,RT,, | Performed by: RADIOLOGY

## 2024-08-01 PROCEDURE — 93005 ELECTROCARDIOGRAM TRACING: CPT

## 2024-08-01 PROCEDURE — 73700 CT LOWER EXTREMITY W/O DYE: CPT | Mod: TC,RT

## 2024-08-01 PROCEDURE — 80048 BASIC METABOLIC PNL TOTAL CA: CPT | Performed by: ORTHOPAEDIC SURGERY

## 2024-08-01 PROCEDURE — 87641 MR-STAPH DNA AMP PROBE: CPT | Performed by: ORTHOPAEDIC SURGERY

## 2024-08-01 PROCEDURE — 36415 COLL VENOUS BLD VENIPUNCTURE: CPT | Performed by: ORTHOPAEDIC SURGERY

## 2024-08-01 PROCEDURE — 85025 COMPLETE CBC W/AUTO DIFF WBC: CPT | Performed by: ORTHOPAEDIC SURGERY

## 2024-08-01 NOTE — DISCHARGE INSTRUCTIONS
To confirm, Your doctor has instructed you that surgery is scheduled for: 8/15/24    Please report to [facility], Registration the morning of surgery. You must check-in and receive a wristband before going to your procedure.  [address] [place] [address] JAKY GARCIA [id number]    Pre-Op will call the afternoon prior to surgery between 1:00 and 6:00 PM with the final arrival time.  Phone number: [phone]    PLEASE NOTE:  The surgery schedule has many variables which may affect the time of your surgery case.  Family members should be available if your surgery time changes.  Plan to be here the day of your procedure between 4-6 hours.    MEDICATIONS:  TAKE ONLY THESE MEDICATIONS WITH A SMALL SIP OF WATER THE MORNING OF YOUR PROCEDURE:    SEE MED LIST          DO NOT TAKE THESE MEDICATIONS 5-7 DAYS PRIOR to your procedure or per your surgeon's request:   ASPIRIN, ALEVE, ADVIL, IBUPROFEN, FISH OIL VITAMIN E, HERBALS  (May take Tylenol)    ONLY if you are prescribed any types of blood thinners such as:  Aspirin, Coumadin, Plavix, Pradaxa, Xarelto, Aggrenox, Effient, Eliquis, Savasya, Brilinta, or any other, ask your surgeon whether you should stop taking them and how long before surgery you should stop.  You may also need to verify with the prescribing physician if it is ok to stop your medication.      INSTRUCTIONS IMPORTANT!!  Do not eat or drink anything between midnight and the time of your procedure- this includes gum, mints, and candy.  Do not smoke or drink alcoholic beverages 24 hours prior to your procedure.  Shower the night before AND the morning of your procedure with a Chlorhexidine wash such as Hibiclens or Dial antibacterial soap from the neck down.  Do not get it on your face or in your eyes.  You may use your own shampoo and face wash. This helps your skin to be as bacteria free as possible.    If you wear contact lenses, dentures, hearing aids or glasses, bring a container to put them in  during surgery and give to a family member for safe keeping.  Please leave all jewelry, piercing's and valuables at home. You must remove your false eyelashes prior to surgery.    DO NOT remove hair from the surgery site.  Do not shave the incision site unless you are given specific instructions to do so.    ONLY if you have been diagnosed with sleep apnea please bring your C-PAP machine.  ONLY if you wear home oxygen please bring your portable oxygen tank the day of your procedure.  ONLY if you have a history of OPEN HEART SURGERY you will need a clearance from your Cardiologist per Anesthesia.      ONLY for patients requiring bowel prep, written instructions will be given by your doctor's office.  ONLY if you have a neuro stimulator, please bring the controller with you the morning of surgery  ONLY if a type and screen test is needed before surgery, please return:  If your doctor has scheduled you for an overnight stay, bring a small overnight bag with any personal items you need.  Make arrangements in advance for transportation home by a responsible adult. You can not go home in an uber or a cab per hospital policy.  It is not safe to drive a vehicle during the 24 hours after anesthesia.          All  facilities and properties are tobacco free.  Smoking is NOT allowed.   If you have any questions about these instructions, call Pre-Op Admit  Nursing at [phone] or the Pre-Op Day Surgery Unit at [phone].

## 2024-08-01 NOTE — PRE ADMISSION SCREENING
"               [facility] Risk Assessment Scale    Patient Name: Trino Head  YOB: 1965  MRN: [id number]            RIsk Factor Measure Recommendation Patient Data Scale/Score   BMI >40 Reconsider surgery, weight loss   Estimated body mass index is 34.29 kg/m² as calculated from the following:    Height as of 7/22/24: 5' 10" (1.778 m).    Weight as of 7/22/24: 108.4 kg (238 lb 15.7 oz).   [] 0 = 1 - 24.9  [] 1 = 25-29.9  [x] 2 = 30-34.9  [] 3 = 35-39.9  [] 4 = 40-44.9  [] 5 = 45-99.9   Hemoglobin AIC (if applicable) >9 Delay surgery until DM under control  Refer for:  Nutrition Therapy  Exercise   Medication    No results found for: "LABA1C", "HGBA1C"    Lab Results   Component Value Date     08/01/2024      [] 0 = 4.0-5.6  [] 1 = 5.7-6.4  [] 2 = 6.5-6.9  [] 3 = 7.0-7.9  [] 4 = 8.0-8.9  [] 5 = 9.0-12   Hemoglobin (Anemia) <9 Delay surgery   Correct anemia Lab Results   Component Value Date    HGB 13.9 (L) 08/01/2024    [] 20 - <9.0                    Albumin <3 Delay surgery &Workup Lab Results   Component Value Date    ALBUMIN 4.1 06/10/2024    [] 20 - <3.0   Smoking Cessation >4 Weeks Delay Surgery  Refer to OP Cessation Class    Never Smoker [] 20 - current smoker                                _____ PPD                    Hx of MI, PE, Arrhythmia, CVA, DVT <30 Days Delay Surgery    N/A [] 20      Infection Variable Delay surgery and re-evaluate   N/A [] 20 - recent/current infection     Depression (PHQ) >10 out of 27 Delay Surgery and re-evaluate  Medication  Counseling              [x] 0     []1     []2     []3      []4      [] 5                    (1-4)      (5-9)  (10-14)  (15-19)   (20-27)     Memory Impairment & Memory loss (Mini-Cog Screening Tool) Advanced dementia and/or Parkinson's Reconsider surgery     [x] 0     []1     []2     []3     []4     [] 5     Physical Conditioning (Modified AM-PAC Per Physical Therapy at [facility]) Unable to ambulate on day of surgery Delay surgery and " re-evaluate  Pre-Rehabilitation   (PT evaluation)       []  0   [x]4       []8     []12        []16     []20       (<20%)   (<40%)   (<60%)   (<80% )    (>80%)     Home Environment/Caregiver support  (Per /Navigator Interview)    Availability of basic services and/or approprate assistance during post-operative period Delay surgery and re-evaluate  Safe home environment  Health   1 week post-surgery  Transportation  availability  Ability to obtain DME/Medications post-op    [x] 0     []1     []2     []3     []4     [] 5  [x] 0     []1     []2     []3     []4     [] 5  [x] 0     []1     []2     []3     []4     [] 5  [x] 0     []1     []2     []3     []4     [] 5         MD Contact: Dr. Martin Vo Comments:  Total Score:  6

## 2024-08-01 NOTE — PRE ADMISSION SCREENING
JOINT CAMP ASSESSMENT    Name Trino Head   MRN [id number]    Age/Sex 59 y.o. male    Surgeon Dr. Martin Vo   Joint Camp Date 8/1/2024   Surgery Date 8/15/2024   Procedure Right Knee Arthroplasty   Insurance Payor: BLUE CROSS BLUE SHIELD / Plan: BCBS ALL OUT OF STATE / Product Type: PPO /    Care Team Patient Care Team:  Titus Graham MD as PCP - General (Family Medicine)  Karl Rosen OD (Optometry)  Jamir Chauhan MD as Consulting Physician (Gastroenterology)    Pharmacy   [id number] - [place], [place] - [address]  [address]  [place] [address]  Phone: [phone] Fax: [phone]     AM-PAC Score   21   Risk Assessment Score 6     Past Medical History:   Diagnosis Date    Arthritis     Digestive disorder     Hypercholesteremia     Hypertension     Renal disorder     kidney stone       Past Surgical History:   Procedure Laterality Date    COLONOSCOPY  2016    Dr. Chauhan-[facility] 10 years    ROBOTIC ARTHROPLASTY, KNEE Left 4/4/2024    Procedure: ROBOTIC ARTHROPLASTY, KNEE, TOTAL, LEFT;  Surgeon: Martin Vo MD;  Location: [facility];  Service: Orthopedics;  Laterality: Left;  Orange-Nithin    VASECTOMY  2012         Home Enviroment     Living Arrangement: Lives with spouse  Home Environment: 2-story house, number of outside stairs: 1, number of inside stairs: 14, bedroom on 1st floor, bathroom on 1st floor, walk-in shower  Home Safety Concerns: Pets in the home: cats (1).    DISCHARGE CAREGIVER/SUPPORT SYSTEM     Identified post-op caregiver: Patient has spouse / significant other.  Patient's caregiver(s) will be able to provide physical assistance. Patient will have someone to assist overnight.      Caregiver present at pre-op interview:  Yes      PRE-OPERATIVE FUNCTIONAL STATUS     Employment: Employed full time    Pre-op Functional Status: Patient is independent with mobility/ambulation, transfers, ADL's, IADL's.    Use of assistive device for ambulation: none  ADL:  self care  ADL Limitations: difficulty with walking  Medical Restrictions: Unstable ambulation and Decreased range of motions in extremities    POTENTIAL BARRIERS TO DISCHARGE/POTENTIAL POST-OP COMPLICATIONS     Patient with hx of HTN, Patient had left knee arthoplasty on 04/04/2024 without complication. POSSIBLE SAME DAY DISCHARGE.    DISCHARGE PLAN     Expected LOS of 1 days or less for joint replacement discussed with patient. We also discussed a discharge path of HH for approximately two weeks with a transition to outpatient PT on the third week given no post-op complications.      Patient in agreement with discharge plan: Yes    Discharge to: Discharge home with home health (PT/OT) x2 weeks with transition to outpatient PT     HH:  [facility] ([place], [place]). Patient disclosure form completed and sent to case management for upload to the medical record.      OP PT: [facility] ([facility])     Home DME: rolling walker and bedside commode    Needed DME at D/C: none     Rx: Per Dr. Martin Vo at discharge     Meds to Beds: Yes  Patient expected to discharge on Aspirin 81mg by mouth twice daily for DVT prophylaxis.

## 2024-08-01 NOTE — PRE ADMISSION SCREENING
Patient Name: Trino Head  YOB: 1965   MRN: [id number]     [facility]   Basic Mobility Inpatient Short Form 6 Clicks         How much difficulty does the patient currently have  Unable  A Lot  A Little  None      1. Turning over in bed (including adjusting bedclothes, sheets and blankets)?     1 []    2 [x]    3 []    4 []        2. Sitting down on and standing up from a chair with arms (e.g., wheelchair, bedside commode, etc.)     1 []  2 []  3 [x]     4 []      3. Moving from lying on back to sitting on the side of the bed?     1 []  2 []  3 []    4 [x]    How much help from another person does the patient currently need  Total  A Lot  A Little  None      4. Moving to and from a bed to a chair (including a wheelchair)?    1 []  2 []  3 []    4 [x]      5. Need to walk in hospital room?    1 []  2 []  3 []    4 [x]      6. Climbing 3-5 steps with a railing?    1 []  2 []  3 []    4 [x]       Raw Score:     21             CMS 0-100% Score:   28.97         %   Standardized Score:   50.25            CMS Modifier:      CJ                                    [facility]   Basic Mobility Inpatient Short Form 6 Clicks Score Conversion Table*         *Use this form to convert -PAC Basic Mobility Inpatient Raw Scores.   [facility] Inpatient Basic Mobility Short Form Scoring Example   1. Add the number values associated with the response to each item. For example, items totals yield a Raw Score of 21.   2. Match the raw score to the t-Scale scores (t-Scale score = 50.25, SE = 4.69).   3. Find the associated CMS % (CMS % = 28.97%).   4. Locate the correct CMS Functional Modifier Code, or G Code (G code = CJ)     NOTE: Each -PAC Short Form has a separate conversion table. Make sure that you use the correct conversion table.       Instruction Manual - page 45 contains conversion table

## 2024-08-02 LAB
OHS QRS DURATION: 84 MS
OHS QTC CALCULATION: 393 MS

## 2024-08-05 ENCOUNTER — TELEPHONE (OUTPATIENT)
Dept: FAMILY MEDICINE | Facility: CLINIC | Age: 59
End: 2024-08-05
Payer: COMMERCIAL

## 2024-08-07 ENCOUNTER — TELEPHONE (OUTPATIENT)
Dept: ORTHOPEDICS | Facility: CLINIC | Age: 59
End: 2024-08-07
Payer: COMMERCIAL

## 2024-08-07 DIAGNOSIS — M17.11 PRIMARY OSTEOARTHRITIS OF RIGHT KNEE: Primary | ICD-10-CM

## 2024-08-13 ENCOUNTER — ANESTHESIA EVENT (OUTPATIENT)
Dept: SURGERY | Facility: HOSPITAL | Age: 59
End: 2024-08-13
Payer: COMMERCIAL

## 2024-08-15 ENCOUNTER — ANESTHESIA (OUTPATIENT)
Dept: SURGERY | Facility: HOSPITAL | Age: 59
End: 2024-08-15
Payer: COMMERCIAL

## 2024-08-15 ENCOUNTER — HOSPITAL ENCOUNTER (OUTPATIENT)
Facility: HOSPITAL | Age: 59
Discharge: HOME | End: 2024-08-15
Attending: ORTHOPAEDIC SURGERY | Admitting: ORTHOPAEDIC SURGERY
Payer: COMMERCIAL

## 2024-08-15 DIAGNOSIS — M17.11 PRIMARY OSTEOARTHRITIS OF RIGHT KNEE: ICD-10-CM

## 2024-08-15 DIAGNOSIS — Z01.818 PRE-OP TESTING: ICD-10-CM

## 2024-08-15 PROCEDURE — 63600175 PHARM REV CODE 636 W HCPCS: Performed by: ORTHOPAEDIC SURGERY

## 2024-08-15 PROCEDURE — 97116 GAIT TRAINING THERAPY: CPT

## 2024-08-15 PROCEDURE — 27200688 HC TRAY, SPINAL-HYPER/ ISOBARIC: Performed by: ANESTHESIOLOGY

## 2024-08-15 PROCEDURE — 37000008 HC ANESTHESIA 1ST 15 MINUTES: Performed by: ORTHOPAEDIC SURGERY

## 2024-08-15 PROCEDURE — 99900031 HC PATIENT EDUCATION (STAT)

## 2024-08-15 PROCEDURE — 64447 NJX AA&/STRD FEMORAL NRV IMG: CPT | Performed by: ANESTHESIOLOGY

## 2024-08-15 PROCEDURE — 63600175 PHARM REV CODE 636 W HCPCS: Performed by: NURSE ANESTHETIST, CERTIFIED REGISTERED

## 2024-08-15 PROCEDURE — 63600175 PHARM REV CODE 636 W HCPCS

## 2024-08-15 PROCEDURE — 27201423 OPTIME MED/SURG SUP & DEVICES STERILE SUPPLY: Performed by: ORTHOPAEDIC SURGERY

## 2024-08-15 PROCEDURE — 97110 THERAPEUTIC EXERCISES: CPT

## 2024-08-15 PROCEDURE — 71000016 HC POSTOP RECOV ADDL HR: Performed by: ORTHOPAEDIC SURGERY

## 2024-08-15 PROCEDURE — 25000003 PHARM REV CODE 250: Performed by: ORTHOPAEDIC SURGERY

## 2024-08-15 PROCEDURE — 37000009 HC ANESTHESIA EA ADD 15 MINS: Performed by: ORTHOPAEDIC SURGERY

## 2024-08-15 PROCEDURE — C1713 ANCHOR/SCREW BN/BN,TIS/BN: HCPCS | Performed by: ORTHOPAEDIC SURGERY

## 2024-08-15 PROCEDURE — 36000713 HC OR TIME LEV V EA ADD 15 MIN: Performed by: ORTHOPAEDIC SURGERY

## 2024-08-15 PROCEDURE — 0055T BONE SRGRY CMPTR CT/MRI IMAG: CPT | Mod: ,,, | Performed by: ORTHOPAEDIC SURGERY

## 2024-08-15 PROCEDURE — C1769 GUIDE WIRE: HCPCS | Performed by: ORTHOPAEDIC SURGERY

## 2024-08-15 PROCEDURE — 27447 TOTAL KNEE ARTHROPLASTY: CPT | Mod: RT,,, | Performed by: ORTHOPAEDIC SURGERY

## 2024-08-15 PROCEDURE — 71000015 HC POSTOP RECOV 1ST HR: Performed by: ORTHOPAEDIC SURGERY

## 2024-08-15 PROCEDURE — 71000039 HC RECOVERY, EACH ADD'L HOUR: Performed by: ORTHOPAEDIC SURGERY

## 2024-08-15 PROCEDURE — 71000033 HC RECOVERY, INTIAL HOUR: Performed by: ORTHOPAEDIC SURGERY

## 2024-08-15 PROCEDURE — 27200665 HC NERVE BLOCK NEEDLE/ CATHETER: Performed by: ANESTHESIOLOGY

## 2024-08-15 PROCEDURE — 36000712 HC OR TIME LEV V 1ST 15 MIN: Performed by: ORTHOPAEDIC SURGERY

## 2024-08-15 PROCEDURE — 63600175 PHARM REV CODE 636 W HCPCS: Mod: JZ,JG | Performed by: ANESTHESIOLOGY

## 2024-08-15 PROCEDURE — 25000003 PHARM REV CODE 250: Performed by: ANESTHESIOLOGY

## 2024-08-15 PROCEDURE — C1776 JOINT DEVICE (IMPLANTABLE): HCPCS | Performed by: ORTHOPAEDIC SURGERY

## 2024-08-15 PROCEDURE — 94799 UNLISTED PULMONARY SVC/PX: CPT

## 2024-08-15 PROCEDURE — C9290 INJ, BUPIVACAINE LIPOSOME: HCPCS | Performed by: ANESTHESIOLOGY

## 2024-08-15 DEVICE — CRUCIATE RETAINING FEMORAL
Type: IMPLANTABLE DEVICE | Site: KNEE | Status: FUNCTIONAL
Brand: TRIATHLON

## 2024-08-15 DEVICE — SYMMETRIC PATELLA
Type: IMPLANTABLE DEVICE | Site: KNEE | Status: FUNCTIONAL
Brand: TRIATHLON

## 2024-08-15 DEVICE — SIMPLEX® HV IS A FAST-SETTING ACRYLIC RESIN FOR USE IN BONE SURGERY. MIXING THE TWO SEPARATE STERILE COMPONENTS PRODUCES A DUCTILE BONE CEMENT WHICH, AFTER HARDENING, FIXES THE IMPLANT AND TRANSFERS STRESSES PRODUCED DURING MOVEMENT EVENLY TO THE BONE. SIMPLEX® HV CEMENT POWDER ALSO CONTAINS INSOLUBLE ZIRCONIUM DIOXIDE AS AN X-RAY CONTRAST MEDIUM. SIMPLEX® HV DOES NOT EMIT A SIGNAL AND DOES NOT POSE A SAFETY RISK IN A MAGNETIC RESONANCE ENVIRONMENT.
Type: IMPLANTABLE DEVICE | Site: KNEE | Status: FUNCTIONAL
Brand: SIMPLEX HV

## 2024-08-15 DEVICE — PRIMARY TIBIAL BASEPLATE
Type: IMPLANTABLE DEVICE | Site: KNEE | Status: FUNCTIONAL
Brand: TRIATHLON

## 2024-08-15 DEVICE — TIBIAL BEARING INSERT - CS
Type: IMPLANTABLE DEVICE | Site: KNEE | Status: FUNCTIONAL
Brand: TRIATHLON

## 2024-08-15 RX ORDER — CELECOXIB 100 MG/1
200 CAPSULE ORAL DAILY
Qty: 14 CAPSULE | Refills: 0 | Status: SHIPPED | OUTPATIENT
Start: 2024-08-15 | End: 2024-08-20 | Stop reason: SDUPTHER

## 2024-08-15 RX ORDER — OXYCODONE HYDROCHLORIDE 10 MG/1
10 TABLET ORAL EVERY 4 HOURS PRN
Status: CANCELLED | OUTPATIENT
Start: 2024-08-15

## 2024-08-15 RX ORDER — DEXAMETHASONE SODIUM PHOSPHATE 4 MG/ML
INJECTION, SOLUTION INTRA-ARTICULAR; INTRALESIONAL; INTRAMUSCULAR; INTRAVENOUS; SOFT TISSUE
Status: DISCONTINUED | OUTPATIENT
Start: 2024-08-15 | End: 2024-08-15

## 2024-08-15 RX ORDER — PROPOFOL 10 MG/ML
INJECTION, EMULSION INTRAVENOUS CONTINUOUS PRN
Status: DISCONTINUED | OUTPATIENT
Start: 2024-08-15 | End: 2024-08-15

## 2024-08-15 RX ORDER — MIDAZOLAM HYDROCHLORIDE 1 MG/ML
2 INJECTION, SOLUTION INTRAMUSCULAR; INTRAVENOUS
Status: DISCONTINUED | OUTPATIENT
Start: 2024-08-15 | End: 2024-08-15 | Stop reason: HOSPADM

## 2024-08-15 RX ORDER — NAPROXEN SODIUM 220 MG/1
81 TABLET, FILM COATED ORAL 2 TIMES DAILY
Status: DISCONTINUED | OUTPATIENT
Start: 2024-08-15 | End: 2024-08-15 | Stop reason: HOSPADM

## 2024-08-15 RX ORDER — OXYCODONE AND ACETAMINOPHEN 5; 325 MG/1; MG/1
1 TABLET ORAL EVERY 4 HOURS PRN
Status: CANCELLED | OUTPATIENT
Start: 2024-08-15

## 2024-08-15 RX ORDER — FENTANYL CITRATE 50 UG/ML
25-200 INJECTION, SOLUTION INTRAMUSCULAR; INTRAVENOUS
Status: DISCONTINUED | OUTPATIENT
Start: 2024-08-15 | End: 2024-08-15 | Stop reason: HOSPADM

## 2024-08-15 RX ORDER — CELECOXIB 100 MG/1
400 CAPSULE ORAL
Status: COMPLETED | OUTPATIENT
Start: 2024-08-15 | End: 2024-08-15

## 2024-08-15 RX ORDER — TRAMADOL HYDROCHLORIDE 50 MG/1
50 TABLET ORAL EVERY 4 HOURS PRN
Status: DISCONTINUED | OUTPATIENT
Start: 2024-08-15 | End: 2024-08-15 | Stop reason: HOSPADM

## 2024-08-15 RX ORDER — MIDAZOLAM HYDROCHLORIDE 1 MG/ML
INJECTION INTRAMUSCULAR; INTRAVENOUS
Status: DISCONTINUED | OUTPATIENT
Start: 2024-08-15 | End: 2024-08-15

## 2024-08-15 RX ORDER — SODIUM CHLORIDE 0.9 % (FLUSH) 0.9 %
2 SYRINGE (ML) INJECTION
Status: DISCONTINUED | OUTPATIENT
Start: 2024-08-15 | End: 2024-08-15 | Stop reason: HOSPADM

## 2024-08-15 RX ORDER — LIDOCAINE HYDROCHLORIDE 10 MG/ML
1 INJECTION, SOLUTION EPIDURAL; INFILTRATION; INTRACAUDAL; PERINEURAL ONCE
Status: DISCONTINUED | OUTPATIENT
Start: 2024-08-15 | End: 2024-08-15 | Stop reason: HOSPADM

## 2024-08-15 RX ORDER — FENTANYL CITRATE 50 UG/ML
INJECTION, SOLUTION INTRAMUSCULAR; INTRAVENOUS
Status: DISCONTINUED | OUTPATIENT
Start: 2024-08-15 | End: 2024-08-15

## 2024-08-15 RX ORDER — BUPIVACAINE HYDROCHLORIDE 7.5 MG/ML
INJECTION, SOLUTION EPIDURAL; RETROBULBAR
Status: COMPLETED | OUTPATIENT
Start: 2024-08-15 | End: 2024-08-15

## 2024-08-15 RX ORDER — BUPIVACAINE HYDROCHLORIDE 5 MG/ML
INJECTION, SOLUTION EPIDURAL; INTRACAUDAL
Status: COMPLETED | OUTPATIENT
Start: 2024-08-15 | End: 2024-08-15

## 2024-08-15 RX ORDER — ACETAMINOPHEN 325 MG/1
325 TABLET ORAL EVERY 6 HOURS PRN
COMMUNITY

## 2024-08-15 RX ORDER — FENTANYL CITRATE 50 UG/ML
25 INJECTION, SOLUTION INTRAMUSCULAR; INTRAVENOUS EVERY 5 MIN PRN
Status: DISCONTINUED | OUTPATIENT
Start: 2024-08-15 | End: 2024-08-15 | Stop reason: HOSPADM

## 2024-08-15 RX ORDER — ONDANSETRON HYDROCHLORIDE 2 MG/ML
4 INJECTION, SOLUTION INTRAVENOUS ONCE AS NEEDED
Status: DISCONTINUED | OUTPATIENT
Start: 2024-08-15 | End: 2024-08-15 | Stop reason: HOSPADM

## 2024-08-15 RX ORDER — OXYCODONE AND ACETAMINOPHEN 5; 325 MG/1; MG/1
1 TABLET ORAL EVERY 4 HOURS PRN
Qty: 42 TABLET | Refills: 0 | Status: SHIPPED | OUTPATIENT
Start: 2024-08-15 | End: 2024-08-20 | Stop reason: SDUPTHER

## 2024-08-15 RX ORDER — OXYCODONE HYDROCHLORIDE 5 MG/1
5 TABLET ORAL ONCE AS NEEDED
Status: COMPLETED | OUTPATIENT
Start: 2024-08-15 | End: 2024-08-15

## 2024-08-15 RX ORDER — OXYCODONE HCL 10 MG/1
10 TABLET, FILM COATED, EXTENDED RELEASE ORAL ONCE
Status: COMPLETED | OUTPATIENT
Start: 2024-08-15 | End: 2024-08-15

## 2024-08-15 RX ORDER — ACETAMINOPHEN 500 MG
1000 TABLET ORAL
Status: COMPLETED | OUTPATIENT
Start: 2024-08-15 | End: 2024-08-15

## 2024-08-15 RX ORDER — ONDANSETRON HYDROCHLORIDE 2 MG/ML
INJECTION, SOLUTION INTRAVENOUS
Status: DISCONTINUED | OUTPATIENT
Start: 2024-08-15 | End: 2024-08-15

## 2024-08-15 RX ADMIN — MIDAZOLAM HYDROCHLORIDE 2 MG: 1 INJECTION INTRAMUSCULAR; INTRAVENOUS at 07:08

## 2024-08-15 RX ADMIN — ROPIVACAINE HYDROCHLORIDE: 5 INJECTION, SOLUTION EPIDURAL; INFILTRATION; PERINEURAL at 08:08

## 2024-08-15 RX ADMIN — OXYCODONE 5 MG: 5 TABLET ORAL at 09:08

## 2024-08-15 RX ADMIN — BUPIVACAINE 20 ML: 13.3 INJECTION, SUSPENSION, LIPOSOMAL INFILTRATION at 06:08

## 2024-08-15 RX ADMIN — ACETAMINOPHEN 1000 MG: 500 TABLET ORAL at 06:08

## 2024-08-15 RX ADMIN — MIDAZOLAM HYDROCHLORIDE 2 MG: 1 INJECTION INTRAMUSCULAR; INTRAVENOUS at 06:08

## 2024-08-15 RX ADMIN — DEXAMETHASONE SODIUM PHOSPHATE 8 MG: 4 INJECTION, SOLUTION INTRA-ARTICULAR; INTRALESIONAL; INTRAMUSCULAR; INTRAVENOUS; SOFT TISSUE at 07:08

## 2024-08-15 RX ADMIN — PROPOFOL 50 MCG/KG/MIN: 10 INJECTION, EMULSION INTRAVENOUS at 07:08

## 2024-08-15 RX ADMIN — OXYCODONE HYDROCHLORIDE 10 MG: 10 TABLET, FILM COATED, EXTENDED RELEASE ORAL at 06:08

## 2024-08-15 RX ADMIN — BUPIVACAINE HYDROCHLORIDE 10 ML: 5 INJECTION, SOLUTION EPIDURAL; INTRACAUDAL; PERINEURAL at 06:08

## 2024-08-15 RX ADMIN — ONDANSETRON 4 MG: 2 INJECTION INTRAMUSCULAR; INTRAVENOUS at 07:08

## 2024-08-15 RX ADMIN — FENTANYL CITRATE 50 MCG: 50 INJECTION, SOLUTION INTRAMUSCULAR; INTRAVENOUS at 06:08

## 2024-08-15 RX ADMIN — BUPIVACAINE HYDROCHLORIDE 1.4 ML: 7.5 INJECTION, SOLUTION EPIDURAL; RETROBULBAR at 07:08

## 2024-08-15 RX ADMIN — SODIUM CHLORIDE, SODIUM GLUCONATE, SODIUM ACETATE, POTASSIUM CHLORIDE AND MAGNESIUM CHLORIDE: 526; 502; 368; 37; 30 INJECTION, SOLUTION INTRAVENOUS at 08:08

## 2024-08-15 RX ADMIN — CELECOXIB 400 MG: 100 CAPSULE ORAL at 06:08

## 2024-08-15 RX ADMIN — TRANEXAMIC ACID 1000 MG: 100 INJECTION, SOLUTION INTRAVENOUS at 07:08

## 2024-08-15 RX ADMIN — SODIUM CHLORIDE, SODIUM GLUCONATE, SODIUM ACETATE, POTASSIUM CHLORIDE AND MAGNESIUM CHLORIDE: 526; 502; 368; 37; 30 INJECTION, SOLUTION INTRAVENOUS at 06:08

## 2024-08-15 RX ADMIN — CEFAZOLIN 2 G: 2 INJECTION, POWDER, FOR SOLUTION INTRAMUSCULAR; INTRAVENOUS at 07:08

## 2024-08-15 NOTE — DISCHARGE INSTRUCTIONS
"Discharge Instructions: After Your Surgery/Procedure  Youve just had surgery. During surgery you were given medicine called anesthesia to keep you relaxed and free of pain. After surgery you may have some pain or nausea. This is common. Here are some tips for feeling better and getting well after surgery.     Stay on schedule with your medication.   Going home  Your doctor or nurse will show you how to take care of yourself when you go home. He or she will also answer your questions. Have an adult family member or friend drive you home.      For your safety we recommend these precaution for the first 24 hours after your procedure:  Do not drive or use heavy equipment.  Do not make important decisions or sign legal papers.  Do not drink alcohol.  Have someone stay with you, if needed. He or she can watch for problems and help keep you safe.  Your concentration, balance, coordination, and judgement may be impaired for many hours after anesthesia.  Use caution when ambulating or standing up.     You may feel weak and "washed out" after anesthesia and surgery.      Subtle residual effects of general anesthesia or sedation with regional / local anesthesia can last more than 24 hours.  Rest for the remainder of the day or longer if your Doctor/Surgeon has advised you to do so.  Although you may feel normal within the first 24 hours, your reflexes and mental ability may be impaired without you realizing it.  You may feel dizzy, lightheaded or sleepy for 24 hours or longer.      Be sure to go to all follow-up visits with your doctor. And rest after your surgery for as long as your doctor tells you to.  Coping with pain  If you have pain after surgery, pain medicine will help you feel better. Take it as told, before pain becomes severe. Also, ask your doctor or pharmacist about other ways to control pain. This might be with heat, ice, or relaxation. And follow any other instructions your surgeon or nurse gives you.  Tips " for taking pain medicine  To get the best relief possible, remember these points:  Pain medicines can upset your stomach. Taking them with a little food may help.  Most pain relievers taken by mouth need at least 20 to 30 minutes to start to work.  Taking medicine on a schedule can help you remember to take it. Try to time your medicine so that you can take it before starting an activity. This might be before you get dressed, go for a walk, or sit down for dinner.  Constipation is a common side effect of pain medicines. Call your doctor before taking any medicines such as laxatives or stool softeners to help ease constipation. Also ask if you should skip any foods. Drinking lots of fluids and eating foods such as fruits and vegetables that are high in fiber can also help. Remember, do not take laxatives unless your surgeon has prescribed them.  Drinking alcohol and taking pain medicine can cause dizziness and slow your breathing. It can even be deadly. Do not drink alcohol while taking pain medicine.  Pain medicine can make you react more slowly to things. Do not drive or run machinery while taking pain medicine.  Your health care provider may tell you to take acetaminophen to help ease your pain. Ask him or her how much you are supposed to take each day. Acetaminophen or other pain relievers may interact with your prescription medicines or other over-the-counter (OTC) drugs. Some prescription medicines have acetaminophen and other ingredients. Using both prescription and OTC acetaminophen for pain can cause you to overdose. Read the labels on your OTC medicines with care. This will help you to clearly know the list of ingredients, how much to take, and any warnings. It may also help you not take too much acetaminophen. If you have questions or do not understand the information, ask your pharmacist or health care provider to explain it to you before you take the OTC medicine.  Managing nausea  Some people have an  upset stomach after surgery. This is often because of anesthesia, pain, or pain medicine, or the stress of surgery. These tips will help you handle nausea and eat healthy foods as you get better. If you were on a special food plan before surgery, ask your doctor if you should follow it while you get better. These tips may help:  Do not push yourself to eat. Your body will tell you when to eat and how much.  Start off with clear liquids and soup. They are easier to digest.  Next try semi-solid foods, such as mashed potatoes, applesauce, and gelatin, as you feel ready.  Slowly move to solid foods. Dont eat fatty, rich, or spicy foods at first.  Do not force yourself to have 3 large meals a day. Instead eat smaller amounts more often.  Take pain medicines with a small amount of solid food, such as crackers or toast, to avoid nausea.     Call your surgeon if  You still have pain an hour after taking medicine. The medicine may not be strong enough.  You feel too sleepy, dizzy, or groggy. The medicine may be too strong.  You have side effects like nausea, vomiting, or skin changes, such as rash, itching, or hives.       If you have obstructive sleep apnea  You were given anesthesia medicine during surgery to keep you comfortable and free of pain. After surgery, you may have more apnea spells because of this medicine and other medicines you were given. The spells may last longer than usual.   At home:  Keep using the continuous positive airway pressure (CPAP) device when you sleep. Unless your health care provider tells you not to, use it when you sleep, day or night. CPAP is a common device used to treat obstructive sleep apnea.  Talk with your provider before taking any pain medicine, muscle relaxants, or sedatives. Your provider will tell you about the possible dangers of taking these medicines.  © 3730-6618 [employer]. [address] [place] [address], [place] [address]. All rights reserved. This information is  not intended as a substitute for professional medical care. Always follow your healthcare professional's instructions.                  Using an Incentive Spirometer    An incentive spirometer is a device that helps you do deep breathing exercises. These exercises expand your lungs, aid in circulation, and help prevent pneumonia. Deep breathing exercises also help you breathe better and improve the function of your lungs by:  Keeping your lungs clear  Strengthening your breathing muscles  Helping prevent respiratory complications or problems  The incentive spirometer gives you a way to take an active part in recover. A nurse or therapist will teach you breathing exercises. To do these exercises, you will breathe in through your mouth and not your nose. The incentive spirometer only works correctly if you breathe in through your mouth.  Steps to clear lungs  Step 1. Exhale normally. Then, inhale normally.  Relax and breathe out.  Step 2. Place your lips tightly around the mouthpiece.  Make sure the device is upright and not tilted.  Step 3. Inhale as much air as you can through the mouthpiece (don't breath through your nose).  Inhale slowly and deeply.  Hold your breath long enough to keep the balls or disk raised for at least 3 to 5 seconds, or as instructed by your healthcare provider.  Some spirometers have an indicator to let you know that you are breathing in too fast. If the indicator goes off, breathe in more slowly.  Step 4. Repeat the exercise regularly.  Do this exercise every hour while you're awake, or as instructed by your healthcare provider.  If you were taught deep breathing and coughing exercises, do them regularly as instructed by your healthcare provider.                     Post op instructions for prevention of DVT  What is deep vein thrombosis?  Deep vein thrombosis (DVT) is the medical term for blood clots in the deep veins of the leg.  These blood clots can be dangerous.  A DVT can block a  blood vessel and keep blood from getting where it needs to go.  Another problem is that the clot can travel to other parts of the body such as the lungs.  A clot that travels to the lungs is called a pulmonary embolus (PE) and can cause serious problems with breathing which can lead to death.  Am I at risk for DVT/PE?  If you are not very active, you are at risk of DVT.  Anyone confined to bed, sitting for long periods of time, recovering from surgery, etc. increases the risk of DVT.  Other risk factors are cancer diagnosis, certain medications, estrogen replacement in any form,older age, obesity, pregnancy, smoking, history of clotting disorders, and dehydration.  How will I know if I have a DVT?  Swelling in the lower leg  Pain  Warmth, redness, hardness or bulging of the vein  If you have any of these symptoms, call your doctors office right away.  Some people will not have any symptoms until the clot moves to the lungs.  What are the symptoms of a PE?  Panting, shortness of breath, or trouble breathing  Sharp, knife-like chest pain when you breathe  Coughing or coughing up blood  Rapid heartbeat  If you have any of these symptoms or get worse quickly, call 911 for emergency treatment.  How can I prevent a DVT?  Avoid long periods of inactivity and dont cross your legs--get up and walk around every hour or so.  Stay active--walking after surgery is highly encouraged.  This means you should get out of the house and walk in the neighborhood.  Going up and down stairs will not impair healing (unless advised against such activity by your doctor).    Drink plenty of noncaffeinated, nonalcoholic fluids each day to prevent dehydration.  Wear special support stockings, if they have been advised by your doctor.  If you travel, stop at least once an hour and walk around.  Avoid smoking (assistance with stopping is available through your healthcare provider)  Always notify your doctor if you are not able to follow the  post operative instructions that are given to you at the time of discharge.  It may be necessary to prescribe one of the medications available to prevent DVT.              We hope your stay was comfortable as you heal now, mend and rest.    For we have enjoyed taking care of you by giving your our best.    And as you get better, by regaining your health and strength;   We count it as a privilege to have served you and hope your time at [facility] was well spent.      Thank  You!!!                Exparel(bupivacaine) has been injected to provide approximately 72 hours of reduced pain after your surgery.  Do not remove the bracelet for five days.  Report to your doctor as soon as possible if you experience any of the following:   Restlessness   Anxiety   Speech problems    Lightheadedness   Numbness and tingling of the mouth and lips   Seizures    Metallic taste   Blurred vision   Tremors    Twitching   Depression   Extreme drowsiness  Avoid additional use of local anesthetics (such as dental procedures) for five days (96 hours).

## 2024-08-15 NOTE — DISCHARGE SUMMARY
[facility]  Discharge Note  Short Stay    Procedure(s) (LRB):  ROBOTIC ARTHROPLASTY, KNEE, TOTAL, RIGHT (Right)      OUTCOME: Patient tolerated treatment/procedure well without complication and is now ready for discharge.    DISPOSITION: Home or Self Care    FINAL DIAGNOSIS:  <principal problem not specified>    FOLLOWUP: In clinic    DISCHARGE INSTRUCTIONS:    Discharge Procedure Orders   Referral to Home health   Referral Priority: Routine Referral Type: Home Health Care   Referral Reason: Specialty Services Required   Requested Specialty: Home Health Services   Number of Visits Requested: 1     Diet general     Leave dressing on - Keep it clean, dry, and intact until clinic visit     Change dressing (specify)   Order Comments: Dressing change: If dressing loses its seal, change daily.     Call MD for:  temperature >100.4     Call MD for:  persistent nausea and vomiting     Call MD for:  severe uncontrolled pain     Call MD for:  difficulty breathing, headache or visual disturbances     Call MD for:  redness, tenderness, or signs of infection (pain, swelling, redness, odor or green/yellow discharge around incision site)     Call MD for:  hives     Call MD for:  persistent dizziness or light-headedness     Call MD for:  extreme fatigue        TIME SPENT ON DISCHARGE: 30 minutes

## 2024-08-15 NOTE — DISCHARGE SUMMARY
[facility]  Discharge Note  Short Stay    Procedure(s) (LRB):  ROBOTIC ARTHROPLASTY, KNEE, TOTAL, RIGHT (Right)      OUTCOME: Patient tolerated treatment/procedure well without complication and is now ready for discharge.    DISPOSITION: Home or Self Care    FINAL DIAGNOSIS:  <principal problem not specified>    FOLLOWUP: In clinic    DISCHARGE INSTRUCTIONS:    Discharge Procedure Orders   Referral to Home health   Referral Priority: Routine Referral Type: Home Health Care   Referral Reason: Specialty Services Required   Requested Specialty: Home Health Services   Number of Visits Requested: 1     Diet general     Leave dressing on - Keep it clean, dry, and intact until clinic visit     Change dressing (specify)   Order Comments: Dressing change: If dressing loses its seal, change daily.     Call MD for:  temperature >100.4     Call MD for:  persistent nausea and vomiting     Call MD for:  severe uncontrolled pain     Call MD for:  difficulty breathing, headache or visual disturbances     Call MD for:  redness, tenderness, or signs of infection (pain, swelling, redness, odor or green/yellow discharge around incision site)     Call MD for:  hives     Call MD for:  persistent dizziness or light-headedness     Call MD for:  extreme fatigue        TIME SPENT ON DISCHARGE: 30 minutes

## 2024-08-15 NOTE — ANESTHESIA POSTPROCEDURE EVALUATION
Anesthesia Post Evaluation    Patient: Trino Head    Procedure(s) Performed: Procedure(s) (LRB):  ROBOTIC ARTHROPLASTY, KNEE, TOTAL (Right)    Final Anesthesia Type: spinal      Patient location during evaluation: PACU  Patient participation: Yes- Able to Participate  Level of consciousness: awake and alert  Post-procedure vital signs: reviewed and stable  Pain management: adequate  Airway patency: patent    PONV status at discharge: No PONV  Anesthetic complications: no      Cardiovascular status: blood pressure returned to baseline  Respiratory status: unassisted  Hydration status: euvolemic  Follow-up not needed.              Vitals Value Taken Time   /89 08/15/24 1115   Temp 36.7 °C (98 °F) 08/15/24 1040   Pulse 64 08/15/24 1115   Resp 16 08/15/24 1115   SpO2 97 % 08/15/24 1115         Event Time   Out of Recovery 10:40:00         Pain/Jossy Score: Pain Rating Prior to Med Admin: 2 (8/15/2024  9:47 AM)  Jossy Score: 10 (8/15/2024 10:30 AM)  Modified Jossy Score: 19 (8/15/2024 11:15 AM)

## 2024-08-15 NOTE — ANESTHESIA PROCEDURE NOTES
Peripheral Block    Patient location during procedure: pre-op   Block not for primary anesthetic.  Reason for block: at surgeon's request and post-op pain management   Post-op Pain Location: Right knee pain   Start time: 8/15/2024 6:46 AM  Timeout: 8/15/2024 6:43 AM   End time: 8/15/2024 6:50 AM    Staffing  Authorizing Provider: Toribio Ribeiro MD  Performing Provider: Toribio Ribeiro MD    Staffing  Performed by: Toribio Ribeiro MD  Authorized by: Toribio Ribeiro MD    Preanesthetic Checklist  Completed: patient identified, IV checked, site marked, risks and benefits discussed, surgical consent, monitors and equipment checked, pre-op evaluation and timeout performed  Peripheral Block  Patient position: supine  Prep: ChloraPrep  Patient monitoring: heart rate, cardiac monitor, continuous pulse ox, continuous capnometry and frequent blood pressure checks  Block type: adductor canal  Laterality: right  Injection technique: single shot  Needle  Needle type: Stimuplex   Needle gauge: 21 G  Needle length: 4 in  Needle localization: anatomical landmarks and ultrasound guidance   -ultrasound image captured on disc.  Assessment  Injection assessment: negative aspiration, negative parasthesia and local visualized surrounding nerve  Paresthesia pain: none  Heart rate change: no  Slow fractionated injection: yes    Medications:    Medications: BUPivacaine liposome (PF) 1.3 % (13.3 mg/mL) suspension - Injection   20 mL - 8/15/2024 6:46:00 AM  bupivacaine (pf) (MARCAINE) injection 0.5% - Perineural   10 mL - 8/15/2024 6:46:00 AM    Additional Notes  VSS.  [facility] RN monitoring vitals throughout procedure.  Patient tolerated procedure well.

## 2024-08-15 NOTE — ANESTHESIA PREPROCEDURE EVALUATION
08/15/2024  Trino Head is a 59 y.o., male.      Pre-op Assessment    I have reviewed the Patient Summary Reports.     I have reviewed the Nursing Notes. I have reviewed the NPO Status.   I have reviewed the Medications.     Review of Systems  Anesthesia Hx:  No problems with previous Anesthesia                Cardiovascular:  Exercise tolerance: good   Hypertension           hyperlipidemia                       Hypertension         Pulmonary:  Pulmonary Normal                       Renal/:  Chronic Renal Disease renal calculi       Kidney Function/Disease             Hepatic/GI:     GERD, well controlled      Gerd          Musculoskeletal:  Arthritis        Arthritis          Neurological:  Neurology Normal              Arthritis                           Endocrine:        Obesity / BMI > 30      Physical Exam  General: Well nourished    Airway:  Mallampati: III   Mouth Opening: Normal  TM Distance: Normal  Tongue: Normal  Neck ROM: Normal ROM    Dental:  Intact    Chest/Lungs:  Clear to auscultation, Normal Respiratory Rate        Anesthesia Plan  Type of Anesthesia, risks & benefits discussed:    Anesthesia Type: Spinal  Intra-op Monitoring Plan: Standard ASA Monitors  Post Op Pain Control Plan: multimodal analgesia, IV/PO Opioids PRN and peripheral nerve block  Induction:  IV  Informed Consent: Patient consented to blood products? Yes  ASA Score: 2  Day of Surgery Review of History & Physical: H&P Update referred to the surgeon/provider.    Ready For Surgery From Anesthesia Perspective.     .

## 2024-08-15 NOTE — PLAN OF CARE
Patient cleared by PT to discharge to home.  Patient able to void with no problems noted.  Dressing to right knee remains clean dry and intact.  Paper prescriptions given to patient and spouse and medications reviewed.  Patient instructed by Dr Marshall to start asa 81 mg bid.  Patient and spouse stated that he has the asa at home. Discharge instructions given to pt and family/friend, verbalized understanding and questions answered. Handouts provided. Belongings given back to pt. IV removed- catheter intact. Discharge via wheelchair.

## 2024-08-15 NOTE — PLAN OF CARE
AAO. VSS. NAD. Resp E/U. Calm and cooperative. Speech appropriate. Tolerating clear liquids. Denies nausea. Pain controlled. IV patent. Dressing to Rt knee CDI. Ice applied. Xray taken. Due to void. Brief on patient. Family notified of patient status. All safety measures taken. Bed in low position. Bedrails up x2. Bed locked. Cleared by Anesthesia.

## 2024-08-15 NOTE — TRANSFER OF CARE
"Anesthesia Transfer of Care Note    Patient: Trino Head    Procedure(s) Performed: Procedure(s) (LRB):  ROBOTIC ARTHROPLASTY, KNEE, TOTAL, RIGHT (Right)    Patient location: PACU    Anesthesia Type: spinal    Transport from OR: Transported from OR on room air with adequate spontaneous ventilation    Post pain: adequate analgesia    Post assessment: tolerated procedure well and no apparent anesthetic complications    Post vital signs: stable    Level of consciousness: awake, alert and oriented    Nausea/Vomiting: no nausea/vomiting    Complications: none    Transfer of care protocol was followed    Last vitals: Visit Vitals  BP (!) 164/74 (BP Location: Left arm, Patient Position: Lying)   Pulse 60   Temp 36.8 °C (98.2 °F)   Resp 20   Ht 5' 10" (1.778 m)   Wt 108.4 kg (238 lb 15.7 oz)   SpO2 99%   BMI 34.29 kg/m²     " 28-Jan-2023 19:20

## 2024-08-15 NOTE — PLAN OF CARE
Patient prepared for procedure.  No questions at this time.  Family at bedside.  Belongings and glasses with spouse.

## 2024-08-15 NOTE — ANESTHESIA PROCEDURE NOTES
Spinal    Diagnosis: knee pain right  Patient location during procedure: OR  Start time: 8/15/2024 7:33 AM  Timeout: 8/15/2024 7:32 AM  End time: 8/15/2024 7:35 AM    Staffing  Authorizing Provider: Toribio Ribeiro MD  Performing Provider: Toribio Ribeiro MD    Staffing  Performed by: Toribio Ribeiro MD  Authorized by: Toribio Ribeiro MD    Preanesthetic Checklist  Completed: patient identified, IV checked, site marked, risks and benefits discussed, surgical consent, monitors and equipment checked, pre-op evaluation and timeout performed  Spinal Block  Patient position: sitting  Prep: ChloraPrep  Patient monitoring: cardiac monitor, continuous pulse ox and continuous capnometry  Approach: right paramedian  Location: L3-4  Injection technique: single shot  CSF Fluid: clear free-flowing CSF  Needle  Needle type: pencil-tip   Needle gauge: 25 G  Needle length: 3.5 in  Needle localization: anatomical landmarks  Medications:    Medications: bupivacaine (pf) (MARCAINE) injection 0.75% - Intraspinal   1.4 mL - 8/15/2024 7:33:00 AM

## 2024-08-15 NOTE — OP NOTE
[facility]  Orthopedic  Operative Note    SUMMARY     Date of Procedure: 8/15/2024     Procedure: Procedure(s) (LRB):  ROBOTIC ARTHROPLASTY, KNEE, TOTAL, RIGHT (Right)       Surgeons and Role:     * Martin Vo MD - Primary    Assisting Surgeon:  Zeinab Luna    Pre-Operative Diagnosis: Primary osteoarthritis of right knee [M17.11]  Pre-op testing [Z01.818]    Post-Operative Diagnosis: Post-Op Diagnosis Codes:     * Primary osteoarthritis of right knee [M17.11]     * Pre-op testing [Z01.818]    Anesthesia: Spinal    Procedure in General:  The patient was brought to the operating room.  Tourniquet placed on right leg.  A spinal anesthetic was given.  A time-out was performed.  The right lower extremity was cleansed with alcohol and prepped with ChloraPrep solution and draped in the usual fashion.  A time-out was performed intravenous antibiotics given.  The tourniquet was inflated after exsanguinating the leg.  A medial arthrotomy was made and the patella was everted and the knee flexed.  Reference points and reference arcs were placed on the femur and tibia.  The hip was brought through a stable range of motion.  Reference points were then placed on the articular surfaces.  The robot was brought onto the field.  With robotic assisted appropriate femoral and tibial cuts were made.  Trial implants were inserted.  The articular surface from the patella was removed.  A polyethylene patella trial was inserted but did.  The knee was brought through a range motion and the patella was noted to track very well.  The chamfer cut was made in the proximal tibia.  The knee was then thoroughly irrigated with the pulsatile lavage.  Methylmethacrylate was placed.  The tibial base plate was cemented in place as well as the all polyethylene patellar component.  A porous-coated femoral component was impacted.  The polyethylene insert for the tibia was inserted.  The knee was brought through a stable  range of motion and had excellent stability.  The tourniquet was deflated.  The knee was irrigated again.  Hemostasis obtained as necessary.  The wound was closed in layers with a combination of absorbable and nonabsorbable sutures.  Staples were used on the skin followed by sterile dressings.          Complications: None    Estimated Blood Loss (EBL):            Implants:   Implant Name Type Inv. Item Serial No.  Lot No. LRB No. Used Action   PIN FIXATION BONE 140X3.2MM - [id number]  PIN FIXATION BONE 140X3.2MM  [employer]. [id number] Right 1 Implanted and Explanted   PIN BONE [id number] - [id number]  PIN BONE [id number]  [employer] RACHEL. [id number] Right 1 Implanted and Explanted   CEMENT BONE SIMPLEX HV RADPQ - [id number]  CEMENT BONE SIMPLEX HV RADPQ  [employer] RACHEL. [id number] Right 1 Implanted   COMPONENT FEM TRI CR SZ5 R - [id number]  COMPONENT FEM TRI CR SZ5 R  [employer] RACHEL. [id number] Right 1 Implanted   PATELLA TRI 33X9 X3 POLYETHYLE - [id number]  PATELLA TRI 33X9 X3 POLYETHYLE  [employer] RACHEL. [id number] Right 1 Implanted   PRIMARY TIBIAL BASE 5. - [id number]  PRIMARY TIBIAL BASE 5.  [employer] RACHEL. [id number] Right 1 Implanted   INSERT TIBIAL SZ 5 9MMX3 - [id number]  INSERT TIBIAL SZ 5 9MMX3  [employer] RACHEL. [id number] Right 1 Implanted       Specimens:   Specimen (24h ago, onward)      None                    Condition: Good    Disposition: PACU - hemodynamically stable.    Attestation: I was present and scrubbed for the entire procedure.

## 2024-08-15 NOTE — PT/OT/SLP EVAL
Physical Therapy Evaluation and Discharge Note    Patient Name:  Trino Head   MRN:  [id number]    Recommendations:     Discharge Recommendations: Low Intensity Therapy  Discharge Equipment Recommendations: none   Barriers to discharge: None    Assessment:     Trino Head is a 59 y.o. male admitted with a medical diagnosis of right TKA.  At this time, patient is scheduled for discharge home and appears will be safe with mobility in the home. Patient would benefit though from continued PT with HHPT to work on ROM and strengthening to further increase safety with mobility.       Recent Surgery: Procedure(s) (LRB):  ROBOTIC ARTHROPLASTY, KNEE, TOTAL (Right) Day of Surgery    Plan:     During this hospitalization, patient does not require further acute PT services.  Please re-consult if situation changes.      Subjective     Chief Complaint: weakness  Patient/Family Comments/goals: get stronger to go home  Pain/Comfort:  Pain Rating 1: 1/10  Location - Side 1: Right  Location - Orientation 1: lower  Location 1: knee  Pain Addressed 1: Reposition, Cessation of Activity  Pain Rating Post-Intervention 1: 5/10    Patients cultural, spiritual, Pentecostalism conflicts given the current situation:      Living Environment:  Currently live with spouse in 2 story home with 1 step to enter but patient does not have to go upstairs.  Prior to admission, patients level of function was independent.  Equipment used at home: none.  DME owned (not currently used): rolling walker and bedside commode.  Upon discharge, patient will have assistance from family.    Objective:     Communicated with nurse prior to session.  Patient found supine with cryotherapy upon PT entry to room.    General Precautions: Standard, fall    Orthopedic Precautions:RLE weight bearing as tolerated   Braces:    Respiratory Status: Room air    Exams:  RLE ROM: Deficits: knee extension -50 degrees, flexion 75 degrees both taken in sitting  RLE Strength: Deficits:  2+/5 overall  LLE ROM: WFL  LLE Strength: Deficits: 4/5 overall    Functional Mobility:  Bed Mobility:     Supine to Sit: stand by assistance  Transfers:     Bed to Chair: minimum assistance with  no AD  using  Stand Pivot  Gait: too numb initially on the evaluation    AM-PAC 6 CLICK MOBILITY  Total Score:19       Treatment and Education:  Gait training x 50 feet, x 125 feet rw min, x 50 max to prevent fall with RW, 5 x 150 feet RW CGA, up/down 4 inch step RW CGA  Exercise to include ankle pumps, quad sets, heel slides, hip abd and LAQ. All done x 10 reps right LE as AAROM    AM-PAC 6 CLICK MOBILITY  Total Score:19     Patient left up in chair with call button in reach, nurse notified, and spouse present.    GOALS:   Multidisciplinary Problems       Physical Therapy Goals       Not on file                    History:     Past Medical History:   Diagnosis Date    Arthritis     Digestive disorder     Hypercholesteremia     Hypertension     Renal disorder     kidney stone       Past Surgical History:   Procedure Laterality Date    COLONOSCOPY  2016    Dr. Chauhan-[facility] 10 years    ROBOTIC ARTHROPLASTY, KNEE Left 4/4/2024    Procedure: ROBOTIC ARTHROPLASTY, KNEE, TOTAL, LEFT;  Surgeon: Martin Vo MD;  Location: [facility];  Service: Orthopedics;  Laterality: Left;  Hartley-Nithin    VASECTOMY  2012       Time Tracking:     PT Received On: 08/15/24  PT Start Time: 1425     PT Stop Time: 1541  PT Total Time (min): 76 min     Billable Minutes: Evaluation 30, Gait Training 35, and Therapeutic Exercise 11      08/15/2024

## 2024-08-16 ENCOUNTER — CLINICAL SUPPORT (OUTPATIENT)
Dept: ORTHOPEDICS | Facility: CLINIC | Age: 59
End: 2024-08-16
Payer: COMMERCIAL

## 2024-08-16 VITALS
DIASTOLIC BLOOD PRESSURE: 89 MMHG | SYSTOLIC BLOOD PRESSURE: 172 MMHG | BODY MASS INDEX: 34.22 KG/M2 | HEIGHT: 70 IN | WEIGHT: 239 LBS | HEIGHT: 70 IN | WEIGHT: 239 LBS | TEMPERATURE: 98 F | OXYGEN SATURATION: 97 % | HEART RATE: 64 BPM | RESPIRATION RATE: 16 BRPM | BODY MASS INDEX: 34.22 KG/M2

## 2024-08-16 DIAGNOSIS — Z96.651 S/P TOTAL KNEE ARTHROPLASTY, RIGHT: Primary | ICD-10-CM

## 2024-08-16 PROCEDURE — 99999 PR PBB SHADOW E&M-EST. PATIENT-LVL III: CPT | Mod: PBBFAC,,,

## 2024-08-16 NOTE — PROGRESS NOTES
Subjective:    Patient ID: Trino Head is a 59 y.o. male.    Chief Complaint: Post-op Evaluation of the Right Knee (Patient is here for a PO day one right TKA 8.15.24. States pain is controlled with medication )      History of Present Illness    Prior to meeting with the patient I reviewed the medical chart in [facility]. This included reviewing the previous progress notes from our office, review of the patient's last appointment with their primary care provider, review of any visits to the emergency room, and review of any pain management appointments or procedures.  Román comes in today postop day 1 right total knee arthroplasty.  Comes in today for wound check, dressing change, pain control assessment.  His pain is controlled.  He is ambulating with his rolling walker.  He is set to begin home health PT today.    Current Medications  Current Outpatient Medications   Medication Sig Dispense Refill    acetaminophen (TYLENOL) 325 MG tablet Take 325 mg by mouth every 6 (six) hours as needed for Pain.      ascorbic acid, vitamin C, (VITAMIN C) 1000 MG tablet Take 1,000 mg by mouth once daily.      atorvastatin (LIPITOR) 20 MG tablet Take 1 tablet (20 mg total) by mouth once daily. 90 tablet 3    celecoxib (CELEBREX) 100 MG capsule Take 2 capsules (200 mg total) by mouth once daily. 14 capsule 0    cholecalciferol, vitamin D3, (VITAMIN D3) 25 mcg (1,000 unit) capsule Take 1,000 Units by mouth once daily.      cyanocobalamin (VITAMIN B-12) 1000 MCG tablet Take 100 mcg by mouth once daily.      flaxseed oil 1,000 mg Cap Take by mouth.      ginkgo biloba 60 mg Cap Take by mouth.      gluc hull/chondro hull A/vit C/Mn (GLUCOSAMINE 1500 COMPLEX ORAL) Take by mouth.      ibuprofen (ADVIL,MOTRIN) 800 MG tablet Take 1 tablet (800 mg total) by mouth 3 (three) times daily. 90 tablet 2    losartan (COZAAR) 50 MG tablet Take 1 tablet (50 mg total) by mouth once daily. 90 tablet 3    multivit-min/FA/lycopen/lutein (CENTRUM SILVER MEN  ORAL) Take by mouth.      mupirocin calcium 2% nasl oint (BACTROBAN) 2 % Oint 1 g by Nasal route 2 (two) times daily. for 5 days 10 g 0    omega 3-dha-epa-fish oil 1,000 mg (120 mg-180 mg) Cap Take by mouth.      omeprazole (PRILOSEC) 40 MG capsule Take 1 capsule (40 mg total) by mouth once daily. 90 capsule 3    oxyCODONE-acetaminophen (PERCOCET) 5-325 mg per tablet Take 1 tablet by mouth every 4 (four) hours as needed for Pain. 42 tablet 0    TURMERIC ORAL Take by mouth.      vitamin E 400 UNIT capsule Take 400 Units by mouth once daily.      propranoloL (INDERAL) 40 MG tablet Take 2 tablets (80 mg total) by mouth once daily. 180 tablet 3     No current facility-administered medications for this visit.       Allergies  Review of patient's allergies indicates:  No Known Allergies    Past Medical History  Past Medical History:   Diagnosis Date    Arthritis     Digestive disorder     Hypercholesteremia     Hypertension     Renal disorder     kidney stone       Surgical History  Past Surgical History:   Procedure Laterality Date    COLONOSCOPY  2016    Dr. [facility]auhan[facility] 10 years    ROBOTIC ARTHROPLASTY, KNEE Left 04/04/2024    Procedure: ROBOTIC ARTHROPLASTY, KNEE, TOTAL, LEFT;  Surgeon: Martin Vo MD;  Location: [facility];  Service: Orthopedics;  Laterality: Left;  Suraj-Nithin    TOTAL KNEE ARTHROPLASTY Right 08/15/2024    VASECTOMY  2012       Family History:   No family history on file.    Social History:   Social History     Socioeconomic History    Marital status:    Tobacco Use    Smoking status: Never    Smokeless tobacco: Never   Substance and Sexual Activity    Alcohol use: Yes     Alcohol/week: 1.0 standard drink of alcohol     Types: 1 Cans of beer per week     Comment: socially    Drug use: Never    Sexual activity: Yes     Partners: Female     Birth control/protection: See Surgical Hx     Social Determinants of Health     Financial Resource Strain: Patient Declined (12/14/2023)    Overall  Financial Resource Strain (CARDIA)     Difficulty of Paying Living Expenses: Patient declined   Food Insecurity: Patient Declined (12/14/2023)    Hunger Vital Sign     Worried About Running Out of Food in the Last Year: Patient declined     Ran Out of Food in the Last Year: Patient declined   Transportation Needs: Patient Declined (12/14/2023)    PRAPARE - Transportation     Lack of Transportation (Medical): Patient declined     Lack of Transportation (Non-Medical): Patient declined   Physical Activity: Unknown (12/14/2023)    Exercise Vital Sign     Days of Exercise per Week: Patient declined   Stress: Patient Declined (12/14/2023)    [employer] - Occupational Stress Questionnaire     Feeling of Stress : Patient declined   Housing Stability: Unknown (12/14/2023)    Housing Stability Vital Sign     Unable to Pay for Housing in the Last Year: Patient refused     Unstable Housing in the Last Year: Patient refused       Date of surgery:  August 15, 2024    Review of Systems     General/Constitutional: Chills denies. Fatigue denies. Fever denies. Weight gain denies. Weight loss denies.    Musculoskeletal: Comments: See HPI for details    Skin: Rash denies.    Objective:   Vital Signs: There were no vitals filed for this visit.     Physical Exam    This a well-developed, well nourished patient in no acute distress.  They are alert and oriented and cooperative to examination.  Pt. walks without an antalgic gait.      General Examination:     Constitutional: The patient is alert and oriented to lace person and time. Mood is pleasant.     Head/Face: Normal facial features normal eyebrows    Eyes: Normal extraocular motion bilaterally    Lungs: Respirations are equal and unlabored    Gait is coordinated.    Cardiovascular: There are no swelling or varicosities present.    Lymphatic: Negative for adenopathy    Skin: Normal    Neurological: Level of consciousness normal. Oriented to place person  "and time and situation    Psychiatric: Oriented to time place person and situation    Right knee exam: Skin to right knee clean dry and intact.  No erythema or ecchymosis.  No signs or symptoms of infection.  Right knee anterior midline incision healing well without wound dehiscence or drainage.  Staples in place.  Negative Homans on the right.  He can weightbear as tolerated on the right lower extremity.  He is ambulating with a rolling walker.    XRAY Report/ Interpretation:  No new radiographs taken on today's clinic visit.      Assessment:       1. S/P total knee arthroplasty, right        Plan:       Trino Lucero" was seen today for post-op evaluation.    Diagnoses and all orders for this visit:    S/P total knee arthroplasty, right         Follow up in about 2 weeks (around 8/30/2024).    Dressing change to right knee today.  He tolerated this well.  He will be using aspirin 81 mg by mouth twice a day for 1 month postop for DVT prophylaxis.  Encouraged him to work with PT for range of motion and ambulation.  We will see him back in 2 weeks for wound check and staple removal.    Treatment options were discussed with regards to the nature of the medical condition. Conservative pain intervention and surgical options were discussed in detail. The probability of success of each separate treatment option was discussed. The patient expressed a clear understanding of the treatment options. With regards to surgery, the procedure risk, benefits, complications, and outcomes were discussed. No guarantees were given with regards to surgical outcome.   The risk of complications, morbidity, and mortality of patient management decisions have been made at the time of this visit. These are associated with the patient's problems, diagnostic procedures and treatment options. This includes the possible management options selected and those considered but not selected by the patient after shared medical decision making we " discussed with the patient.     This note was created using Dragon voice recognition software that occasionally misinterpreted phrases or words.

## 2024-08-20 ENCOUNTER — PATIENT MESSAGE (OUTPATIENT)
Dept: ORTHOPEDICS | Facility: CLINIC | Age: 59
End: 2024-08-20
Payer: COMMERCIAL

## 2024-08-20 ENCOUNTER — LAB VISIT (OUTPATIENT)
Dept: LAB | Facility: HOSPITAL | Age: 59
End: 2024-08-20
Attending: ORTHOPAEDIC SURGERY
Payer: COMMERCIAL

## 2024-08-20 DIAGNOSIS — Z96.651 PRESENCE OF RIGHT ARTIFICIAL KNEE JOINT: ICD-10-CM

## 2024-08-20 DIAGNOSIS — E78.00 PURE HYPERCHOLESTEROLEMIA: ICD-10-CM

## 2024-08-20 DIAGNOSIS — I10 ESSENTIAL HYPERTENSION, MALIGNANT: ICD-10-CM

## 2024-08-20 DIAGNOSIS — M17.11 OSTEOARTHRITIS OF RIGHT KNEE: Primary | ICD-10-CM

## 2024-08-20 DIAGNOSIS — Z96.651 S/P TOTAL KNEE ARTHROPLASTY, RIGHT: Primary | ICD-10-CM

## 2024-08-20 PROCEDURE — 85025 COMPLETE CBC W/AUTO DIFF WBC: CPT | Performed by: ORTHOPAEDIC SURGERY

## 2024-08-20 RX ORDER — OXYCODONE AND ACETAMINOPHEN 5; 325 MG/1; MG/1
1 TABLET ORAL EVERY 6 HOURS PRN
Qty: 28 TABLET | Refills: 0 | Status: SHIPPED | OUTPATIENT
Start: 2024-08-20 | End: 2024-08-27

## 2024-08-20 RX ORDER — CELECOXIB 100 MG/1
100 CAPSULE ORAL 2 TIMES DAILY
Qty: 60 CAPSULE | Refills: 0 | Status: SHIPPED | OUTPATIENT
Start: 2024-08-20 | End: 2024-09-19

## 2024-08-21 LAB
BASOPHILS # BLD AUTO: 0.04 K/UL (ref 0–0.2)
BASOPHILS NFR BLD: 0.5 % (ref 0–1.9)
DIFFERENTIAL METHOD BLD: ABNORMAL
EOSINOPHIL # BLD AUTO: 0.1 K/UL (ref 0–0.5)
EOSINOPHIL NFR BLD: 0.9 % (ref 0–8)
ERYTHROCYTE [DISTWIDTH] IN BLOOD BY AUTOMATED COUNT: 16 % (ref 11.5–14.5)
HCT VFR BLD AUTO: 36.1 % (ref 40–54)
HGB BLD-MCNC: 10.8 G/DL (ref 14–18)
IMM GRANULOCYTES # BLD AUTO: 0.02 K/UL (ref 0–0.04)
IMM GRANULOCYTES NFR BLD AUTO: 0.3 % (ref 0–0.5)
LYMPHOCYTES # BLD AUTO: 1.7 K/UL (ref 1–4.8)
LYMPHOCYTES NFR BLD: 21.2 % (ref 18–48)
MCH RBC QN AUTO: 28.9 PG (ref 27–31)
MCHC RBC AUTO-ENTMCNC: 29.9 G/DL (ref 32–36)
MCV RBC AUTO: 97 FL (ref 82–98)
MONOCYTES # BLD AUTO: 0.9 K/UL (ref 0.3–1)
MONOCYTES NFR BLD: 11.2 % (ref 4–15)
NEUTROPHILS # BLD AUTO: 5.2 K/UL (ref 1.8–7.7)
NEUTROPHILS NFR BLD: 65.9 % (ref 38–73)
NRBC BLD-RTO: 0 /100 WBC
PLATELET # BLD AUTO: 330 K/UL (ref 150–450)
PLATELET # BLD AUTO: 330 K/UL (ref 150–450)
PMV BLD AUTO: 10.4 FL (ref 9.2–12.9)
PMV BLD AUTO: 10.4 FL (ref 9.2–12.9)
RBC # BLD AUTO: 3.74 M/UL (ref 4.6–6.2)
WBC # BLD AUTO: 7.92 K/UL (ref 3.9–12.7)

## 2024-08-23 ENCOUNTER — LAB VISIT (OUTPATIENT)
Dept: LAB | Facility: HOSPITAL | Age: 59
End: 2024-08-23
Attending: ORTHOPAEDIC SURGERY
Payer: COMMERCIAL

## 2024-08-23 DIAGNOSIS — Z47.1 AFTERCARE FOLLOWING JOINT REPLACEMENT: Primary | ICD-10-CM

## 2024-08-23 LAB
ERYTHROCYTE [DISTWIDTH] IN BLOOD BY AUTOMATED COUNT: 15.8 % (ref 11.5–14.5)
HCT VFR BLD AUTO: 35.2 % (ref 40–54)
HGB BLD-MCNC: 11 G/DL (ref 14–18)
MCH RBC QN AUTO: 29.1 PG (ref 27–31)
MCHC RBC AUTO-ENTMCNC: 31.3 G/DL (ref 32–36)
MCV RBC AUTO: 93 FL (ref 82–98)
PLATELET # BLD AUTO: 438 K/UL (ref 150–450)
PLATELET # BLD AUTO: 438 K/UL (ref 150–450)
PMV BLD AUTO: 10.2 FL (ref 9.2–12.9)
PMV BLD AUTO: 10.2 FL (ref 9.2–12.9)
RBC # BLD AUTO: 3.78 M/UL (ref 4.6–6.2)
WBC # BLD AUTO: 7.46 K/UL (ref 3.9–12.7)

## 2024-08-23 PROCEDURE — 85027 COMPLETE CBC AUTOMATED: CPT | Performed by: ORTHOPAEDIC SURGERY

## 2024-08-29 NOTE — PROGRESS NOTES
[facility]  [address]  [place] [address]  Phone: [phone]   [phone]    Patient's PCP:Titus Graham MD  Referring Provider: No ref. provider found    POST-OP Note:    Subjective:        Chief Complaint:   Chief Complaint   Patient presents with    Right Knee - Pain     STAPLES// PO Right TKA 8/15/24, states his pain is better than his last visit, has a bit of swelling and some shooting pain in the knee off/on       Past Medical History:   Diagnosis Date    Arthritis     Digestive disorder     Hypercholesteremia     Hypertension     Renal disorder     kidney stone       Past Surgical History:   Procedure Laterality Date    COLONOSCOPY  2016    Dr. Chauhan-[facility] 10 years    ROBOTIC ARTHROPLASTY, KNEE Left 04/04/2024    Procedure: ROBOTIC ARTHROPLASTY, KNEE, TOTAL, LEFT;  Surgeon: Martin Vo MD;  Location: [facility];  Service: Orthopedics;  Laterality: Left;  Suraj-Nithin    ROBOTIC ARTHROPLASTY, KNEE Right 8/15/2024    Procedure: ROBOTIC ARTHROPLASTY, KNEE, TOTAL;  Surgeon: Martin Vo MD;  Location: [facility];  Service: Orthopedics;  Laterality: Right;  Suraj-Nithin    TOTAL KNEE ARTHROPLASTY Right 08/15/2024    VASECTOMY  2012       Current Outpatient Medications   Medication Sig    acetaminophen (TYLENOL) 325 MG tablet Take 325 mg by mouth every 6 (six) hours as needed for Pain.    ascorbic acid, vitamin C, (VITAMIN C) 1000 MG tablet Take 1,000 mg by mouth once daily.    atorvastatin (LIPITOR) 20 MG tablet Take 1 tablet (20 mg total) by mouth once daily.    celecoxib (CELEBREX) 100 MG capsule Take 1 capsule (100 mg total) by mouth 2 (two) times daily.    cholecalciferol, vitamin D3, (VITAMIN D3) 25 mcg (1,000 unit) capsule Take 1,000 Units by mouth once daily.    cyanocobalamin (VITAMIN B-12) 1000 MCG tablet Take 100 mcg by mouth once daily.    flaxseed oil 1,000 mg Cap Take by mouth.    ginkgo biloba 60 mg Cap Take by mouth.    gluc hull/chondro hull A/vit C/Mn  (GLUCOSAMINE 1500 COMPLEX ORAL) Take by mouth.    losartan (COZAAR) 50 MG tablet Take 1 tablet (50 mg total) by mouth once daily.    multivit-min/FA/lycopen/lutein (CENTRUM SILVER MEN ORAL) Take by mouth.    mupirocin (BACTROBAN) 2 % ointment SMARTSIG:Both Nares    omega 3-dha-epa-fish oil 1,000 mg (120 mg-180 mg) Cap Take by mouth.    omeprazole (PRILOSEC) 40 MG capsule Take 1 capsule (40 mg total) by mouth once daily.    TURMERIC ORAL Take by mouth.    vitamin E 400 UNIT capsule Take 400 Units by mouth once daily.    oxyCODONE-acetaminophen (PERCOCET) 5-325 mg per tablet Take 1 tablet by mouth every 6 (six) hours as needed for Pain.    propranoloL (INDERAL) 40 MG tablet Take 2 tablets (80 mg total) by mouth once daily.     No current facility-administered medications for this visit.       Review of patient's allergies indicates:  No Known Allergies    No family history on file.    Social History     Socioeconomic History    Marital status:    Tobacco Use    Smoking status: Never    Smokeless tobacco: Never   Substance and Sexual Activity    Alcohol use: Yes     Alcohol/week: 1.0 standard drink of alcohol     Types: 1 Cans of beer per week     Comment: socially    Drug use: Never    Sexual activity: Yes     Partners: Female     Birth control/protection: See Surgical Hx     Social Determinants of Health     Financial Resource Strain: Patient Declined (12/14/2023)    Overall Financial Resource Strain (CARDIA)     Difficulty of Paying Living Expenses: Patient declined   Food Insecurity: Patient Declined (12/14/2023)    Hunger Vital Sign     Worried About Running Out of Food in the Last Year: Patient declined     Ran Out of Food in the Last Year: Patient declined   Transportation Needs: Patient Declined (12/14/2023)    PRAPARE - Transportation     Lack of Transportation (Medical): Patient declined     Lack of Transportation (Non-Medical): Patient declined   Physical Activity: Unknown (12/14/2023)    Exercise  Vital Sign     Days of Exercise per Week: Patient declined   Stress: Patient Declined (12/14/2023)    [employer] - Occupational Stress Questionnaire     Feeling of Stress : Patient declined   Housing Stability: Unknown (12/14/2023)    Housing Stability Vital Sign     Unable to Pay for Housing in the Last Year: Patient refused     Unstable Housing in the Last Year: Patient refused       History of present illness: 59 y.o. male returns to clinic today status post right knee arthroplasty August 15th.  Patient is here today for routine follow-up.    Review of Systems:    Musculoskeletal:  See HPI       Objective:        Physical Examination:    Vital Signs: There were no vitals filed for this visit.    Body mass index is 34.29 kg/m².    This a well-developed, well nourished patient in no acute distress.  They are alert and oriented and cooperative to examination.        Examination of the right knee, with midline surgical incision consistent with history of stated procedure.  The  skin is dry and intact, no erythema.  There is some ecchymosis in the thigh in the popliteal area but no signs symptoms of infection.  No evidence of wound failure dehiscence.  The remainder of the patient's surgical dressings and sutures were removed today without complication.  Homans sign is negative, straight leg raise is negative.  There are 2+ distal pulses and intact light touch sensation.    Range of motion of the right knee      Pertinent New Results:       XRAY Report / Interpretation:            Assessment:       1. S/P total knee arthroplasty, right      Plan:     S/P total knee arthroplasty, right  -     Ambulatory referral/consult to Physical/Occupational Therapy; Future; Expected date: 09/06/2024  -     oxyCODONE-acetaminophen (PERCOCET) 5-325 mg per tablet; Take 1 tablet by mouth every 6 (six) hours as needed for Pain.  Dispense: 28 tablet; Refill: 0        Follow up for Mon/Wed with Dr. Vo, 1  month f/u - right TKA 8/15/24.    Stable status post robotic right total knee arthroplasty August 15th.  Patient's wounds healing as expected.  No evidence of wound failure dehiscence.  No infection.  We will transition of formal physical therapy.  We stressed elevation, at least 1 hour to preferably 3 times a day flat in the bed 3-4 pillows underneath the ankle only.  follow up in 1 month.    Wound Care review: on approximately day 3 after surgery, or 72 hours after your initial surgery date, you may begin cleaning your wounds (AS LONG AS THERE IS NO DRAINAGE PRESENT).     Gentle cleansing with a mild soap and water is recommended. Pat the area dry, and then cover the wound with a clean, sterile dressing.  Do this at least once daily.  Do not apply any ointments creams or lotions to the wounds until told to do so.  Avoid submerging or immersing the wounds in water such as a sink, tub, or pool for at least 1 month after surgery.    At the time of your surgery you were prescribed a combination of various medications which may have included up to six prescriptions:      1. First prescription was for an anticoagulant.  Most commonly aspirin 81 mg to be taken every 12 hours for 30 days postoperatively for DVT prophylaxis.  If you took aspirin prior to the start of this medication you can resume your normal aspirin dosing per your prescribing provider after 30 days.    If you were already on an anticoagulant then continue this medication as directed daily.    2. Second prescription was for a narcotic pain medication (Percocet 7.5 mg or similar) with instructions to take 1 tablet by mouth every 6 hours as needed for pain. Over the next 3 months, we will continue to taper your medications decreasing both the frequency and strength of the medications over time.    3. Third prescription was for an anti-inflammatory, Celebrex 200 mg with instructions to take 1 tab by mouth as needed every 12 hours for 1 month postoperatively.      4. Fourth prescription is for Neurontin 300 mg with instructions to take 1 tablet by mouth as needed every 12 hours for 1 month postoperatively.    5. Fifth prescription was for Colace 100 mg with instructions to take 1 tablet by mouth as needed every 12 hours for constipation associated with narcotic use.    6.  A Sixth and final prescription may be an antiemetic such as Zofran 4 mg with instructions to take 1 tablet by mouth as needed for postop nausea if you suffer from this.           DAMIEN Almendarez, PA-C    This note was created using [employer] voice recognition software that occasionally misinterprets words or phrases.

## 2024-08-30 ENCOUNTER — OFFICE VISIT (OUTPATIENT)
Dept: ORTHOPEDICS | Facility: CLINIC | Age: 59
End: 2024-08-30
Payer: COMMERCIAL

## 2024-08-30 VITALS — WEIGHT: 239 LBS | HEIGHT: 70 IN | BODY MASS INDEX: 34.22 KG/M2

## 2024-08-30 DIAGNOSIS — Z96.651 S/P TOTAL KNEE ARTHROPLASTY, RIGHT: Primary | ICD-10-CM

## 2024-08-30 PROCEDURE — 99999 PR PBB SHADOW E&M-EST. PATIENT-LVL IV: CPT | Mod: PBBFAC,,, | Performed by: PHYSICIAN ASSISTANT

## 2024-08-30 RX ORDER — OXYCODONE AND ACETAMINOPHEN 5; 325 MG/1; MG/1
TABLET ORAL
COMMUNITY
Start: 2024-08-15 | End: 2024-08-30

## 2024-08-30 RX ORDER — MUPIROCIN 20 MG/G
OINTMENT TOPICAL
COMMUNITY
Start: 2024-08-15

## 2024-08-30 RX ORDER — OXYCODONE AND ACETAMINOPHEN 5; 325 MG/1; MG/1
1 TABLET ORAL EVERY 6 HOURS PRN
Qty: 28 TABLET | Refills: 0 | Status: SHIPPED | OUTPATIENT
Start: 2024-08-30 | End: 2024-09-06

## 2024-09-03 ENCOUNTER — PATIENT MESSAGE (OUTPATIENT)
Dept: FAMILY MEDICINE | Facility: CLINIC | Age: 59
End: 2024-09-03
Payer: COMMERCIAL

## 2024-09-03 DIAGNOSIS — F51.01 PRIMARY INSOMNIA: Primary | ICD-10-CM

## 2024-09-04 RX ORDER — TEMAZEPAM 15 MG/1
15 CAPSULE ORAL NIGHTLY PRN
Qty: 30 CAPSULE | Refills: 3 | Status: SHIPPED | OUTPATIENT
Start: 2024-09-04 | End: 2024-10-04

## 2024-09-08 DIAGNOSIS — Z96.651 S/P TOTAL KNEE ARTHROPLASTY, RIGHT: ICD-10-CM

## 2024-09-09 ENCOUNTER — EXTERNAL HOME HEALTH (OUTPATIENT)
Dept: HOME HEALTH SERVICES | Facility: HOSPITAL | Age: 59
End: 2024-09-09
Payer: COMMERCIAL

## 2024-09-09 RX ORDER — OXYCODONE AND ACETAMINOPHEN 5; 325 MG/1; MG/1
1 TABLET ORAL EVERY 8 HOURS PRN
Qty: 21 TABLET | Refills: 0 | Status: SHIPPED | OUTPATIENT
Start: 2024-09-09 | End: 2024-09-16

## 2024-09-16 DIAGNOSIS — Z96.651 S/P TOTAL KNEE ARTHROPLASTY, RIGHT: ICD-10-CM

## 2024-09-17 RX ORDER — OXYCODONE AND ACETAMINOPHEN 5; 325 MG/1; MG/1
1 TABLET ORAL EVERY 8 HOURS PRN
Qty: 21 TABLET | Refills: 0 | Status: SHIPPED | OUTPATIENT
Start: 2024-09-17 | End: 2024-09-24

## 2024-09-18 DIAGNOSIS — Z96.651 S/P TOTAL KNEE ARTHROPLASTY, RIGHT: ICD-10-CM

## 2024-09-18 RX ORDER — CELECOXIB 100 MG/1
100 CAPSULE ORAL 2 TIMES DAILY
Qty: 60 CAPSULE | Refills: 6 | Status: SHIPPED | OUTPATIENT
Start: 2024-09-18

## 2024-09-23 DIAGNOSIS — Z96.651 S/P TOTAL KNEE ARTHROPLASTY, RIGHT: ICD-10-CM

## 2024-09-24 RX ORDER — OXYCODONE AND ACETAMINOPHEN 5; 325 MG/1; MG/1
1 TABLET ORAL EVERY 8 HOURS PRN
Qty: 21 TABLET | Refills: 0 | Status: SHIPPED | OUTPATIENT
Start: 2024-09-24 | End: 2024-09-25

## 2024-09-25 ENCOUNTER — PATIENT MESSAGE (OUTPATIENT)
Dept: ORTHOPEDICS | Facility: CLINIC | Age: 59
End: 2024-09-25
Payer: COMMERCIAL

## 2024-09-25 DIAGNOSIS — Z96.651 S/P TOTAL KNEE ARTHROPLASTY, RIGHT: Primary | ICD-10-CM

## 2024-09-25 RX ORDER — TRAMADOL HYDROCHLORIDE 50 MG/1
50 TABLET ORAL EVERY 6 HOURS PRN
Qty: 28 TABLET | Refills: 0 | Status: SHIPPED | OUTPATIENT
Start: 2024-09-25

## 2024-10-02 ENCOUNTER — OFFICE VISIT (OUTPATIENT)
Dept: ORTHOPEDICS | Facility: CLINIC | Age: 59
End: 2024-10-02
Payer: COMMERCIAL

## 2024-10-02 VITALS — WEIGHT: 239 LBS | BODY MASS INDEX: 34.22 KG/M2 | HEIGHT: 70 IN

## 2024-10-02 DIAGNOSIS — Z96.651 S/P TOTAL KNEE ARTHROPLASTY, RIGHT: Primary | ICD-10-CM

## 2024-10-02 PROCEDURE — 1160F RVW MEDS BY RX/DR IN RCRD: CPT | Mod: CPTII,S$GLB,, | Performed by: ORTHOPAEDIC SURGERY

## 2024-10-02 PROCEDURE — 4010F ACE/ARB THERAPY RXD/TAKEN: CPT | Mod: CPTII,S$GLB,, | Performed by: ORTHOPAEDIC SURGERY

## 2024-10-02 PROCEDURE — 1159F MED LIST DOCD IN RCRD: CPT | Mod: CPTII,S$GLB,, | Performed by: ORTHOPAEDIC SURGERY

## 2024-10-02 PROCEDURE — 99024 POSTOP FOLLOW-UP VISIT: CPT | Mod: S$GLB,,, | Performed by: ORTHOPAEDIC SURGERY

## 2024-10-02 PROCEDURE — 99999 PR PBB SHADOW E&M-EST. PATIENT-LVL IV: CPT | Mod: PBBFAC,,, | Performed by: ORTHOPAEDIC SURGERY

## 2024-10-02 NOTE — PROGRESS NOTES
Subjective:    Patient ID: Trino Head is a 59 y.o. male.    Chief Complaint: Post-op Evaluation of the Right Knee (Patient is here for a f/u on right TKA 8.15.24. States pain has improved since last visit. Has occasional burning and shooting pain )      History of Present Illness    Prior to meeting with the patient I reviewed the medical chart in [facility]. This included reviewing the previous progress notes from our office, review of the patient's last appointment with their primary care provider, review of any visits to the emergency room, and review of any pain management appointments or procedures.  Follow-up total knee replacement symptoms have improved does have a small rash where he had a bandage on his knee after a suture came out.  Otherwise do an better    Current Medications  Current Outpatient Medications   Medication Sig Dispense Refill    acetaminophen (TYLENOL) 325 MG tablet Take 325 mg by mouth every 6 (six) hours as needed for Pain.      ascorbic acid, vitamin C, (VITAMIN C) 1000 MG tablet Take 1,000 mg by mouth once daily.      atorvastatin (LIPITOR) 20 MG tablet Take 1 tablet (20 mg total) by mouth once daily. 90 tablet 3    celecoxib (CELEBREX) 100 MG capsule TAKE 1 CAPSULE BY MOUTH TWICE A DAY 60 capsule 6    cholecalciferol, vitamin D3, (VITAMIN D3) 25 mcg (1,000 unit) capsule Take 1,000 Units by mouth once daily.      cyanocobalamin (VITAMIN B-12) 1000 MCG tablet Take 100 mcg by mouth once daily.      flaxseed oil 1,000 mg Cap Take by mouth.      ginkgo biloba 60 mg Cap Take by mouth.      gluc hull/chondro hull A/vit C/Mn (GLUCOSAMINE 1500 COMPLEX ORAL) Take by mouth.      losartan (COZAAR) 50 MG tablet Take 1 tablet (50 mg total) by mouth once daily. 90 tablet 3    multivit-min/FA/lycopen/lutein (CENTRUM SILVER MEN ORAL) Take by mouth.      mupirocin (BACTROBAN) 2 % ointment SMARTSIG:Both Nares      omega 3-dha-epa-fish oil 1,000 mg (120 mg-180 mg) Cap Take by mouth.      omeprazole (PRILOSEC)  40 MG capsule Take 1 capsule (40 mg total) by mouth once daily. 90 capsule 3    propranoloL (INDERAL) 40 MG tablet Take 2 tablets (80 mg total) by mouth once daily. 180 tablet 3    temazepam (RESTORIL) 15 mg Cap Take 1 capsule (15 mg total) by mouth nightly as needed (INSOMNIA). 30 capsule 3    traMADoL (ULTRAM) 50 mg tablet Take 1 tablet (50 mg total) by mouth every 6 (six) hours as needed for Pain. 28 tablet 0    TURMERIC ORAL Take by mouth.      vitamin E 400 UNIT capsule Take 400 Units by mouth once daily.       No current facility-administered medications for this visit.       Allergies  Review of patient's allergies indicates:  No Known Allergies    Past Medical History  Past Medical History:   Diagnosis Date    Arthritis     Digestive disorder     Hypercholesteremia     Hypertension     Renal disorder     kidney stone       Surgical History  Past Surgical History:   Procedure Laterality Date    COLONOSCOPY  2016    Dr. Chauhan[facility] 10 years    ROBOTIC ARTHROPLASTY, KNEE Left 04/04/2024    Procedure: ROBOTIC ARTHROPLASTY, KNEE, TOTAL, LEFT;  Surgeon: Martin Vo MD;  Location: [facility];  Service: Orthopedics;  Laterality: Left;  [place]    ROBOTIC ARTHROPLASTY, KNEE Right 8/15/2024    Procedure: ROBOTIC ARTHROPLASTY, KNEE, TOTAL;  Surgeon: Martin Vo MD;  Location: [facility];  Service: Orthopedics;  Laterality: Right;  [place]    TOTAL KNEE ARTHROPLASTY Right 08/15/2024    VASECTOMY  2012       Family History:   No family history on file.    Social History:   Social History     Socioeconomic History    Marital status:    Tobacco Use    Smoking status: Never    Smokeless tobacco: Never   Substance and Sexual Activity    Alcohol use: Yes     Alcohol/week: 1.0 standard drink of alcohol     Types: 1 Cans of beer per week     Comment: socially    Drug use: Never    Sexual activity: Yes     Partners: Female     Birth control/protection: See Surgical Hx     Social Drivers of Health      Financial Resource Strain: Patient Declined (12/14/2023)    Overall Financial Resource Strain (CARDIA)     Difficulty of Paying Living Expenses: Patient declined   Food Insecurity: Patient Declined (12/14/2023)    Hunger Vital Sign     Worried About Running Out of Food in the Last Year: Patient declined     Ran Out of Food in the Last Year: Patient declined   Transportation Needs: Patient Declined (12/14/2023)    PRAPARE - Transportation     Lack of Transportation (Medical): Patient declined     Lack of Transportation (Non-Medical): Patient declined   Physical Activity: Unknown (12/14/2023)    Exercise Vital Sign     Days of Exercise per Week: Patient declined   Stress: Patient Declined (12/14/2023)    [employer] [place] [employer] - Occupational Stress Questionnaire     Feeling of Stress : Patient declined   Housing Stability: Unknown (12/14/2023)    Housing Stability Vital Sign     Unable to Pay for Housing in the Last Year: Patient refused     Unstable Housing in the Last Year: Patient refused       Date of surgery:     Review of Systems     General/Constitutional: Chills denies. Fatigue denies. Fever denies. Weight gain denies. Weight loss denies.    Musculoskeletal: Comments: See HPI for details    Skin: Rash denies.    Objective:   Vital Signs: There were no vitals filed for this visit.     Physical Exam    This a well-developed, well nourished patient in no acute distress.  They are alert and oriented and cooperative to examination.  Pt. walks without an antalgic gait.      General Examination:     Constitutional: The patient is alert and oriented to lace person and time. Mood is pleasant.     Head/Face: Normal facial features normal eyebrows    Eyes: Normal extraocular motion bilaterally    Lungs: Respirations are equal and unlabored    Gait is coordinated.    Cardiovascular: There are no swelling or varicosities present.    Lymphatic: Negative for adenopathy    Skin: Normal    Neurological:  "Level of consciousness normal. Oriented to place person and time and situation    Psychiatric: Oriented to time place person and situation    No signs of infection actually range of motion 0-95 degrees without pain no instability noted  XRAY Report/ Interpretation:         Assessment:       1. S/P total knee arthroplasty, right        Plan:       Trino Lucero" was seen today for post-op evaluation.    Diagnoses and all orders for this visit:    S/P total knee arthroplasty, right         Follow up for 2 month f/u - right TKA 8/15/24, left TKA 4/4/24.    Continue with exercise program return 6-8 weeks with x-rays both knees  Treatment options were discussed with regards to the nature of the medical condition. Conservative pain intervention and surgical options were discussed in detail. The probability of success of each separate treatment option was discussed. The patient expressed a clear understanding of the treatment options. With regards to surgery, the procedure risk, benefits, complications, and outcomes were discussed. No guarantees were given with regards to surgical outcome.   The risk of complications, morbidity, and mortality of patient management decisions have been made at the time of this visit. These are associated with the patient's problems, diagnostic procedures and treatment options. This includes the possible management options selected and those considered but not selected by the patient after shared medical decision making we discussed with the patient.     This note was created using Dragon voice recognition software that occasionally misinterpreted phrases or words.               "

## 2024-10-10 ENCOUNTER — PATIENT MESSAGE (OUTPATIENT)
Dept: ORTHOPEDICS | Facility: CLINIC | Age: 59
End: 2024-10-10
Payer: COMMERCIAL

## 2024-10-10 DIAGNOSIS — Z96.651 S/P TOTAL KNEE ARTHROPLASTY, RIGHT: Primary | ICD-10-CM

## 2024-10-10 RX ORDER — OXYCODONE AND ACETAMINOPHEN 5; 325 MG/1; MG/1
1 TABLET ORAL EVERY 8 HOURS PRN
Qty: 21 TABLET | Refills: 0 | Status: SHIPPED | OUTPATIENT
Start: 2024-10-10 | End: 2024-10-17

## 2024-10-21 ENCOUNTER — PATIENT MESSAGE (OUTPATIENT)
Dept: FAMILY MEDICINE | Facility: CLINIC | Age: 59
End: 2024-10-21
Payer: COMMERCIAL

## 2024-10-21 RX ORDER — TEMAZEPAM 15 MG/1
15 CAPSULE ORAL NIGHTLY PRN
Qty: 30 CAPSULE | Refills: 3 | Status: SHIPPED | OUTPATIENT
Start: 2024-10-21

## 2024-11-12 DIAGNOSIS — E78.00 PURE HYPERCHOLESTEROLEMIA: ICD-10-CM

## 2024-11-13 RX ORDER — ATORVASTATIN CALCIUM 20 MG/1
20 TABLET, FILM COATED ORAL DAILY
Qty: 90 TABLET | Refills: 3 | Status: SHIPPED | OUTPATIENT
Start: 2024-11-13 | End: 2025-11-13

## 2024-11-20 RX ORDER — TEMAZEPAM 15 MG/1
15 CAPSULE ORAL NIGHTLY PRN
Qty: 30 CAPSULE | Refills: 3 | Status: CANCELLED | OUTPATIENT
Start: 2024-11-20

## 2024-12-04 ENCOUNTER — HOSPITAL ENCOUNTER (OUTPATIENT)
Dept: RADIOLOGY | Facility: HOSPITAL | Age: 59
Discharge: HOME | End: 2024-12-04
Attending: ORTHOPAEDIC SURGERY
Payer: COMMERCIAL

## 2024-12-04 ENCOUNTER — OFFICE VISIT (OUTPATIENT)
Dept: ORTHOPEDICS | Facility: CLINIC | Age: 59
End: 2024-12-04
Payer: COMMERCIAL

## 2024-12-04 VITALS — BODY MASS INDEX: 34.22 KG/M2 | HEIGHT: 70 IN | WEIGHT: 239 LBS

## 2024-12-04 DIAGNOSIS — Z96.653 HISTORY OF TOTAL KNEE ARTHROPLASTY, BILATERAL: Primary | ICD-10-CM

## 2024-12-04 PROCEDURE — 99999 PR PBB SHADOW E&M-EST. PATIENT-LVL IV: CPT | Mod: PBBFAC,,, | Performed by: ORTHOPAEDIC SURGERY

## 2024-12-04 PROCEDURE — 73560 X-RAY EXAM OF KNEE 1 OR 2: CPT | Mod: 26,,, | Performed by: RADIOLOGY

## 2024-12-04 PROCEDURE — 73560 X-RAY EXAM OF KNEE 1 OR 2: CPT | Mod: TC,50,PN

## 2024-12-04 PROCEDURE — 4010F ACE/ARB THERAPY RXD/TAKEN: CPT | Mod: CPTII,S$GLB,, | Performed by: ORTHOPAEDIC SURGERY

## 2024-12-04 PROCEDURE — 3008F BODY MASS INDEX DOCD: CPT | Mod: CPTII,S$GLB,, | Performed by: ORTHOPAEDIC SURGERY

## 2024-12-04 PROCEDURE — 99213 OFFICE O/P EST LOW 20 MIN: CPT | Mod: S$GLB,,, | Performed by: ORTHOPAEDIC SURGERY

## 2024-12-04 PROCEDURE — 1160F RVW MEDS BY RX/DR IN RCRD: CPT | Mod: CPTII,S$GLB,, | Performed by: ORTHOPAEDIC SURGERY

## 2024-12-04 PROCEDURE — 1159F MED LIST DOCD IN RCRD: CPT | Mod: CPTII,S$GLB,, | Performed by: ORTHOPAEDIC SURGERY

## 2024-12-04 RX ORDER — DICLOFENAC POTASSIUM 50 MG/1
50 TABLET, FILM COATED ORAL 2 TIMES DAILY
Qty: 60 TABLET | Refills: 1 | Status: SHIPPED | OUTPATIENT
Start: 2024-12-04

## 2024-12-04 NOTE — PROGRESS NOTES
Subjective:       Patient ID: Trino Head is a 59 y.o. male.    Chief Complaint: Pain of the Right Knee (Patient is here for a follow up on right TKA 8.15.24 as well as left TKA 4.4.24, states pain has stayed about the same since last visit. Has numbness in certain areas around the incision ) and Pain of the Left Knee      History of Present Illness    Prior to meeting with the patient I reviewed the medical chart in [facility]. This included reviewing the previous progress notes from our office, review of the patient's last appointment with their primary care provider, review of any visits to the emergency room, and review of any pain management appointments or procedures.   Status post bilateral knee replacements 1 done in April the other in August he is still having some continued pain and stiffness in both knees and is not able to return to his previous job left knee is a little bit more symptomatic.  He has difficulty and is fearful going down stairs or steps where he has a hold onto a railing has if fear that his knee will give out.    Current Medications  Current Outpatient Medications   Medication Sig Dispense Refill    acetaminophen (TYLENOL) 325 MG tablet Take 325 mg by mouth every 6 (six) hours as needed for Pain.      ascorbic acid, vitamin C, (VITAMIN C) 1000 MG tablet Take 1,000 mg by mouth once daily.      atorvastatin (LIPITOR) 20 MG tablet Take 1 tablet (20 mg total) by mouth once daily. 90 tablet 3    celecoxib (CELEBREX) 100 MG capsule TAKE 1 CAPSULE BY MOUTH TWICE A DAY 60 capsule 6    cholecalciferol, vitamin D3, (VITAMIN D3) 25 mcg (1,000 unit) capsule Take 1,000 Units by mouth once daily.      cyanocobalamin (VITAMIN B-12) 1000 MCG tablet Take 100 mcg by mouth once daily.      flaxseed oil 1,000 mg Cap Take by mouth.      ginkgo biloba 60 mg Cap Take by mouth.      gluc hull/chondro hull A/vit C/Mn (GLUCOSAMINE 1500 COMPLEX ORAL) Take by mouth.      losartan (COZAAR) 50 MG tablet Take 1 tablet (50  mg total) by mouth once daily. 90 tablet 3    multivit-min/FA/lycopen/lutein (CENTRUM SILVER MEN ORAL) Take by mouth.      mupirocin (BACTROBAN) 2 % ointment SMARTSIG:Both Nares (Patient not taking: Reported on 12/4/2024)      omega 3-dha-epa-fish oil 1,000 mg (120 mg-180 mg) Cap Take by mouth.      omeprazole (PRILOSEC) 40 MG capsule Take 1 capsule (40 mg total) by mouth once daily. 90 capsule 3    propranoloL (INDERAL) 40 MG tablet Take 2 tablets (80 mg total) by mouth once daily. 180 tablet 3    temazepam (RESTORIL) 15 mg Cap Take 1 capsule (15 mg total) by mouth nightly as needed (INSOMNIA). 30 capsule 3    traMADoL (ULTRAM) 50 mg tablet Take 1 tablet (50 mg total) by mouth every 6 (six) hours as needed for Pain. (Patient not taking: Reported on 12/4/2024) 28 tablet 0    TURMERIC ORAL Take by mouth.      vitamin E 400 UNIT capsule Take 400 Units by mouth once daily.       No current facility-administered medications for this visit.       Allergies  Review of patient's allergies indicates:  No Known Allergies    Past Medical History  Past Medical History:   Diagnosis Date    Arthritis     Digestive disorder     Hypercholesteremia     Hypertension     Renal disorder     kidney stone       Surgical History  Past Surgical History:   Procedure Laterality Date    COLONOSCOPY  2016    Dr. Chauhan[facility] 10 years    ROBOTIC ARTHROPLASTY, KNEE Left 04/04/2024    Procedure: ROBOTIC ARTHROPLASTY, KNEE, TOTAL, LEFT;  Surgeon: Martin Vo MD;  Location: [facility];  Service: Orthopedics;  Laterality: Left;  [place]    ROBOTIC ARTHROPLASTY, KNEE Right 8/15/2024    Procedure: ROBOTIC ARTHROPLASTY, KNEE, TOTAL;  Surgeon: Martin Vo MD;  Location: [facility];  Service: Orthopedics;  Laterality: Right;  [place]    TOTAL KNEE ARTHROPLASTY Right 08/15/2024    VASECTOMY  2012       Family History:   No family history on file.    Social History:   Social History     Socioeconomic History    Marital status:     Tobacco Use    Smoking status: Never    Smokeless tobacco: Never   Substance and Sexual Activity    Alcohol use: Yes     Alcohol/week: 1.0 standard drink of alcohol     Types: 1 Cans of beer per week     Comment: socially    Drug use: Never    Sexual activity: Yes     Partners: Female     Birth control/protection: See Surgical Hx     Social Drivers of Health     Financial Resource Strain: Patient Declined (12/14/2023)    Overall Financial Resource Strain (CARDIA)     Difficulty of Paying Living Expenses: Patient declined   Food Insecurity: Patient Declined (12/14/2023)    Hunger Vital Sign     Worried About Running Out of Food in the Last Year: Patient declined     Ran Out of Food in the Last Year: Patient declined   Transportation Needs: Patient Declined (12/14/2023)    PRAPARE - Transportation     Lack of Transportation (Medical): Patient declined     Lack of Transportation (Non-Medical): Patient declined   Physical Activity: Unknown (12/14/2023)    Exercise Vital Sign     Days of Exercise per Week: Patient declined   Stress: Patient Declined (12/14/2023)    [employer] [place] [employer] - Occupational Stress Questionnaire     Feeling of Stress : Patient declined   Housing Stability: Unknown (12/14/2023)    Housing Stability Vital Sign     Unable to Pay for Housing in the Last Year: Patient refused     Unstable Housing in the Last Year: Patient refused       Hospitalization/Major Diagnostic Procedure:     Review of Systems     General/Constitutional:  Chills denies. Fatigue denies. Fever denies. Weight gain denies. Weight loss denies.    Respiratory:  Shortness of breath denies.    Cardiovascular:  Chest pain denies.    Gastrointestinal:  Constipation denies. Diarrhea denies. Nausea denies. Vomiting denies.     Hematology:  Easy bruising denies. Prolonged bleeding denies.     Genitourinary:  Frequent urination denies. Pain in lower back denies. Painful urination denies.     Musculoskeletal:  See HPI  "for details    Skin:  Rash denies.    Neurologic:  Dizziness denies. Gait abnormalities denies. Seizures denies. Tingling/Numbess denies.    Psychiatric:  Anxiety denies. Depressed mood denies.     Objective:   Vital Signs: There were no vitals filed for this visit.     Physical Exam      General Examination:     Constitutional: The patient is alert and oriented to lace person and time. Mood is pleasant.     Head/Face: Normal facial features normal eyebrows    Eyes: Normal extraocular motion bilaterally    Lungs: Respirations are equal and unlabored    Gait is coordinated.    Cardiovascular: There are no swelling or varicosities present.    Lymphatic: Negative for adenopathy    Skin: Normal    Neurological: Level of consciousness normal. Oriented to place person and time and situation    Psychiatric: Oriented to time place person and situation    Mild tenderness on the anterior quads of both knees proximal to the patella us extension is full flexion is 100° there is no swelling  XRAY Report/ Interpretation:  AP lateral x-rays both knees were taken today total knee replacement implants present in good position no signs of loosening      Assessment:       1. S/P total knee arthroplasty, right        Plan:       Trino Lucero" was seen today for pain and pain.    Diagnoses and all orders for this visit:    S/P total knee arthroplasty, right  -     Cancel: X-Ray Knee 3 View Right  -     X-Ray Knee 1 or 2 View Right         No follow-ups on file.    Patient is still progressing slowly though improving he is not able to return to his prior job and the requirements of the job and thus theoretically he is still permanently disabled from returning to his previous line of employment.  I support that decision.  I do not think he is at maximum medical improvement.  Return 3 months for repeat x-rays    Prescription for diclofenac potassium to be taken twice daily for 2 weeks and then once daily as knee    Treatment options were " discussed with regards to the nature of the medical condition. Conservative pain intervention and surgical options were discussed in detail. The probability of success of each separate treatment option was discussed. The patient expressed a clear understanding of the treatment options. With regards to surgery, the procedure risk, benefits, complications, and outcomes were discussed. No guarantees were given with regards to surgical outcome.   The risk of complications, morbidity, and mortality of patient management decisions have been made at the time of this visit. These are associated with the patient's problems, diagnostic procedures and treatment options. This includes the possible management options selected and those considered but not selected by the patient after shared medical decision making we discussed with the patient.     This note was created using Dragon voice recognition software that occasionally misinterpreted phrases or words.

## 2024-12-19 ENCOUNTER — TELEPHONE (OUTPATIENT)
Dept: FAMILY MEDICINE | Facility: CLINIC | Age: 59
End: 2024-12-19
Payer: COMMERCIAL

## 2024-12-19 DIAGNOSIS — I10 PRIMARY HYPERTENSION: ICD-10-CM

## 2024-12-19 DIAGNOSIS — E78.00 PURE HYPERCHOLESTEROLEMIA: ICD-10-CM

## 2024-12-19 DIAGNOSIS — Z79.899 ENCOUNTER FOR LONG-TERM (CURRENT) USE OF MEDICATIONS: Primary | ICD-10-CM

## 2025-01-03 ENCOUNTER — PATIENT MESSAGE (OUTPATIENT)
Dept: ORTHOPEDICS | Facility: CLINIC | Age: 60
End: 2025-01-03
Payer: COMMERCIAL

## 2025-01-08 ENCOUNTER — OFFICE VISIT (OUTPATIENT)
Dept: FAMILY MEDICINE | Facility: CLINIC | Age: 60
End: 2025-01-08
Payer: COMMERCIAL

## 2025-01-08 VITALS
HEIGHT: 70 IN | OXYGEN SATURATION: 98 % | WEIGHT: 253 LBS | SYSTOLIC BLOOD PRESSURE: 138 MMHG | HEART RATE: 78 BPM | BODY MASS INDEX: 36.22 KG/M2 | DIASTOLIC BLOOD PRESSURE: 88 MMHG

## 2025-01-08 DIAGNOSIS — Z00.00 WELLNESS EXAMINATION: Primary | ICD-10-CM

## 2025-01-08 DIAGNOSIS — F51.01 PRIMARY INSOMNIA: ICD-10-CM

## 2025-01-08 DIAGNOSIS — E78.00 PURE HYPERCHOLESTEROLEMIA: ICD-10-CM

## 2025-01-08 DIAGNOSIS — K21.9 GASTROESOPHAGEAL REFLUX DISEASE WITHOUT ESOPHAGITIS: ICD-10-CM

## 2025-01-08 DIAGNOSIS — G25.2 INTENTION TREMOR: ICD-10-CM

## 2025-01-08 DIAGNOSIS — M19.012 PRIMARY OSTEOARTHRITIS OF BOTH SHOULDERS: ICD-10-CM

## 2025-01-08 DIAGNOSIS — Z96.653 STATUS POST BILATERAL KNEE REPLACEMENTS: ICD-10-CM

## 2025-01-08 DIAGNOSIS — I10 PRIMARY HYPERTENSION: ICD-10-CM

## 2025-01-08 DIAGNOSIS — M19.011 PRIMARY OSTEOARTHRITIS OF BOTH SHOULDERS: ICD-10-CM

## 2025-01-08 DIAGNOSIS — R74.8 ELEVATED LIVER ENZYMES: ICD-10-CM

## 2025-01-08 PROBLEM — M17.12 PRIMARY OSTEOARTHRITIS OF LEFT KNEE: Status: RESOLVED | Noted: 2023-06-14 | Resolved: 2025-01-08

## 2025-01-08 PROCEDURE — 3008F BODY MASS INDEX DOCD: CPT | Mod: CPTII,S$GLB,, | Performed by: FAMILY MEDICINE

## 2025-01-08 PROCEDURE — 1159F MED LIST DOCD IN RCRD: CPT | Mod: CPTII,S$GLB,, | Performed by: FAMILY MEDICINE

## 2025-01-08 PROCEDURE — 3079F DIAST BP 80-89 MM HG: CPT | Mod: CPTII,S$GLB,, | Performed by: FAMILY MEDICINE

## 2025-01-08 PROCEDURE — 99396 PREV VISIT EST AGE 40-64: CPT | Mod: S$GLB,,, | Performed by: FAMILY MEDICINE

## 2025-01-08 PROCEDURE — 3075F SYST BP GE 130 - 139MM HG: CPT | Mod: CPTII,S$GLB,, | Performed by: FAMILY MEDICINE

## 2025-01-08 NOTE — PROGRESS NOTES
SUBJECTIVE:    Patient ID: Trino Head is a 59 y.o. male.    Chief Complaint: Hypertension (Review Lab-results, shaking hands, back pain, abc )    Hypertension  Pertinent negatives include no chest pain, headaches, neck pain, palpitations or shortness of breath.       History of Present Illness    CHIEF COMPLAINT:  Trino presents today for follow-up on knee replacements.    BILATERAL KNEE REPLACEMENTS:  He underwent bilateral knee replacements in 2023 - left knee in April and right knee in August. Left knee presents with stiffness, inflammation, and mild swelling. Right knee exhibits stiffness but to a lesser degree than the left knee, without swelling. He denies any infection in either knee. He reports difficulty with stairs, particularly when descending. His occupation requires 12,000 to 14,000 steps per day for four days per week, and he is currently on disability leave due to his knee conditions.    BACK PAIN:  He reports back pain that occurred after bending down to  a hairband.    ESSENTIAL TREMOR:  He experiences tremors affecting his hands and fingers. Propranolol provides partial relief but does not completely alleviate the tremors. Family history is positive for tremors, as his mother also experiences them and takes propranolol.    MEDICATIONS:  He takes Diclofenac for inflammation, having previously tried Celebrex without success. The Diclofenac dosage was adjusted for back pain. He uses Temazepam for sleep maintenance, Omeprazole for stomach protection with Diclofenac, and Propranolol with two doses at night for tremors.    SOCIAL HISTORY:  He reports 15 lb  weight gain since being out of work. He consumes alcohol rarely, denies smoking, and reports salty snacks are a dietary weakness.    LABS:  Cholesterol is 186. Liver enzymes are slightly elevated.      ROS:  Constitutional: -appetite change, -chills, -fatigue, -fever, -unexpected weight change, +weight gain  HENT: -ear pain, -trouble  swallowing  Eyes: -pain, -discharge, -visual disturbance  Respiratory: -apnea, -cough, -shortness of breath, -wheezing  Cardiovascular: -chest pain, -leg swelling  Gastrointestinal: -abdominal pain, -blood in stool, -constipation, -diarrhea, -nausea, -vomiting, -reflux  Endocrine: -cold intolerance, -heat intolerance, -polydipsia  Genitourinary: -bladder incontinence, -dysuria, -erectile dysfunction, -frequency, -hematuria, -testicular pain, -urgency, -nocturia  Musculoskeletal: -gait problem, +joint swelling, -myalgia, +joint stiffness, +back pain  Neurological: -dizziness, -seizures, -numbness, +tremors  Psychiatric/Behavioral: -agitation, -hallucinations, -nervous, -anxiety symptoms         Telephone on 12/19/2024   Component Date Value Ref Range Status    Cholesterol 12/30/2024 186  <200 mg/dL Final    HDL 12/30/2024 56  > OR = 40 mg/dL Final    Triglycerides 12/30/2024 118  <150 mg/dL Final    LDL Cholesterol 12/30/2024 107 (H)  mg/dL (calc) Final    HDL/Cholesterol Ratio 12/30/2024 3.3  <5.0 (calc) Final    Non HDL Chol. (LDL+VLDL) 12/30/2024 130 (H)  <130 mg/dL (calc) Final    Glucose 12/30/2024 101 (H)  65 - 99 mg/dL Final    BUN 12/30/2024 16  7 - 25 mg/dL Final    Creatinine 12/30/2024 0.82  0.70 - 1.30 mg/dL Final    eGFR 12/30/2024 101  > OR = 60 mL/min/1.73m2 Final    BUN/Creatinine Ratio 12/30/2024 SEE NOTE:  6 - 22 (calc) Final    Sodium 12/30/2024 139  135 - 146 mmol/L Final    Potassium 12/30/2024 4.6  3.5 - 5.3 mmol/L Final    Chloride 12/30/2024 104  98 - 110 mmol/L Final    CO2 12/30/2024 31  20 - 32 mmol/L Final    Calcium 12/30/2024 9.7  8.6 - 10.3 mg/dL Final    Total Protein 12/30/2024 6.4  6.1 - 8.1 g/dL Final    Albumin 12/30/2024 4.2  3.6 - 5.1 g/dL Final    Globulin, Total 12/30/2024 2.2  1.9 - 3.7 g/dL (calc) Final    Albumin/Globulin Ratio 12/30/2024 1.9  1.0 - 2.5 (calc) Final    Total Bilirubin 12/30/2024 0.4  0.2 - 1.2 mg/dL Final    Alkaline Phosphatase 12/30/2024 76  35 - 144 U/L  Final    AST 12/30/2024 36 (H)  10 - 35 U/L Final    ALT 12/30/2024 67 (H)  9 - 46 U/L Final   Lab Visit on 08/23/2024   Component Date Value Ref Range Status    WBC 08/23/2024 7.46  3.90 - 12.70 K/uL Final    RBC 08/23/2024 3.78 (L)  4.60 - 6.20 M/uL Final    Hemoglobin 08/23/2024 11.0 (L)  14.0 - 18.0 g/dL Final    Hematocrit 08/23/2024 35.2 (L)  40.0 - 54.0 % Final    MCV 08/23/2024 93  82 - 98 fL Final    MCH 08/23/2024 29.1  27.0 - 31.0 pg Final    MCHC 08/23/2024 31.3 (L)  32.0 - 36.0 g/dL Final    RDW 08/23/2024 15.8 (H)  11.5 - 14.5 % Final    Platelets 08/23/2024 438  150 - 450 K/uL Final    MPV 08/23/2024 10.2  9.2 - 12.9 fL Final    Platelets 08/23/2024 438  150 - 450 K/uL Final    MPV 08/23/2024 10.2  9.2 - 12.9 fL Final   Lab Visit on 08/20/2024   Component Date Value Ref Range Status    WBC 08/20/2024 7.92  3.90 - 12.70 K/uL Final    RBC 08/20/2024 3.74 (L)  4.60 - 6.20 M/uL Final    Hemoglobin 08/20/2024 10.8 (L)  14.0 - 18.0 g/dL Final    Hematocrit 08/20/2024 36.1 (L)  40.0 - 54.0 % Final    MCV 08/20/2024 97  82 - 98 fL Final    MCH 08/20/2024 28.9  27.0 - 31.0 pg Final    MCHC 08/20/2024 29.9 (L)  32.0 - 36.0 g/dL Final    RDW 08/20/2024 16.0 (H)  11.5 - 14.5 % Final    Platelets 08/20/2024 330  150 - 450 K/uL Final    MPV 08/20/2024 10.4  9.2 - 12.9 fL Final    Immature Granulocytes 08/20/2024 0.3  0.0 - 0.5 % Final    Gran # (ANC) 08/20/2024 5.2  1.8 - 7.7 K/uL Final    Immature Grans (Abs) 08/20/2024 0.02  0.00 - 0.04 K/uL Final    Lymph # 08/20/2024 1.7  1.0 - 4.8 K/uL Final    Mono # 08/20/2024 0.9  0.3 - 1.0 K/uL Final    Eos # 08/20/2024 0.1  0.0 - 0.5 K/uL Final    Baso # 08/20/2024 0.04  0.00 - 0.20 K/uL Final    nRBC 08/20/2024 0  0 /100 WBC Final    Gran % 08/20/2024 65.9  38.0 - 73.0 % Final    Lymph % 08/20/2024 21.2  18.0 - 48.0 % Final    Mono % 08/20/2024 11.2  4.0 - 15.0 % Final    Eosinophil % 08/20/2024 0.9  0.0 - 8.0 % Final    Basophil % 08/20/2024 0.5  0.0 - 1.9 % Final     Differential Method 08/20/2024 Automated   Final    Platelets 08/20/2024 330  150 - 450 K/uL Final    MPV 08/20/2024 10.4  9.2 - 12.9 fL Final   Hospital Outpatient Visit on 08/01/2024   Component Date Value Ref Range Status    WBC 08/01/2024 5.52  3.90 - 12.70 K/uL Final    RBC 08/01/2024 4.85  4.60 - 6.20 M/uL Final    Hemoglobin 08/01/2024 13.9 (L)  14.0 - 18.0 g/dL Final    Hematocrit 08/01/2024 43.6  40.0 - 54.0 % Final    MCV 08/01/2024 90  82 - 98 fL Final    MCH 08/01/2024 28.7  27.0 - 31.0 pg Final    MCHC 08/01/2024 31.9 (L)  32.0 - 36.0 g/dL Final    RDW 08/01/2024 14.9 (H)  11.5 - 14.5 % Final    Platelets 08/01/2024 309  150 - 450 K/uL Final    MPV 08/01/2024 10.3  9.2 - 12.9 fL Final    Immature Granulocytes 08/01/2024 0.2  0.0 - 0.5 % Final    Gran # (ANC) 08/01/2024 3.0  1.8 - 7.7 K/uL Final    Immature Grans (Abs) 08/01/2024 0.01  0.00 - 0.04 K/uL Final    Lymph # 08/01/2024 1.8  1.0 - 4.8 K/uL Final    Mono # 08/01/2024 0.6  0.3 - 1.0 K/uL Final    Eos # 08/01/2024 0.1  0.0 - 0.5 K/uL Final    Baso # 08/01/2024 0.07  0.00 - 0.20 K/uL Final    nRBC 08/01/2024 0  0 /100 WBC Final    Gran % 08/01/2024 54.8  38.0 - 73.0 % Final    Lymph % 08/01/2024 31.9  18.0 - 48.0 % Final    Mono % 08/01/2024 10.5  4.0 - 15.0 % Final    Eosinophil % 08/01/2024 1.3  0.0 - 8.0 % Final    Basophil % 08/01/2024 1.3  0.0 - 1.9 % Final    Differential Method 08/01/2024 Automated   Final    Sodium 08/01/2024 140  136 - 145 mmol/L Final    Potassium 08/01/2024 4.4  3.5 - 5.1 mmol/L Final    Chloride 08/01/2024 106  95 - 110 mmol/L Final    CO2 08/01/2024 26  23 - 29 mmol/L Final    Glucose 08/01/2024 100  70 - 110 mg/dL Final    BUN 08/01/2024 17  6 - 20 mg/dL Final    Creatinine 08/01/2024 0.9  0.5 - 1.4 mg/dL Final    Calcium 08/01/2024 9.5  8.7 - 10.5 mg/dL Final    Anion Gap 08/01/2024 8  8 - 16 mmol/L Final    eGFR 08/01/2024 >60  >60 mL/min/1.73 m^2 Final    MRSA SCREEN BY PCR 08/01/2024 Negative  Negative Final     QRS Duration 08/01/2024 84  ms Final    [facility] QTC Calculation 08/01/2024 393  ms Final       Past Medical History:   Diagnosis Date    Arthritis     Digestive disorder     Hypercholesteremia     Hypertension     Renal disorder     kidney stone     Social History     Socioeconomic History    Marital status:    Tobacco Use    Smoking status: Never    Smokeless tobacco: Never   Substance and Sexual Activity    Alcohol use: Yes     Alcohol/week: 1.0 standard drink of alcohol     Types: 1 Cans of beer per week     Comment: socially    Drug use: Never    Sexual activity: Yes     Partners: Female     Birth control/protection: See Surgical Hx     Social Drivers of Health     Financial Resource Strain: Patient Declined (12/14/2023)    Overall Financial Resource Strain (CARDIA)     Difficulty of Paying Living Expenses: Patient declined   Food Insecurity: Patient Declined (12/14/2023)    Hunger Vital Sign     Worried About Running Out of Food in the Last Year: Patient declined     Ran Out of Food in the Last Year: Patient declined   Transportation Needs: Patient Declined (12/14/2023)    PRAPARE - Transportation     Lack of Transportation (Medical): Patient declined     Lack of Transportation (Non-Medical): Patient declined   Physical Activity: Unknown (12/14/2023)    Exercise Vital Sign     Days of Exercise per Week: Patient declined   Stress: Patient Declined (12/14/2023)    [employer] - Occupational Stress Questionnaire     Feeling of Stress : Patient declined   Housing Stability: Unknown (12/14/2023)    Housing Stability Vital Sign     Unable to Pay for Housing in the Last Year: Patient refused     Unstable Housing in the Last Year: Patient refused     Past Surgical History:   Procedure Laterality Date    COLONOSCOPY  2016    Dr. Chauhan-RT 10 years    ROBOTIC ARTHROPLASTY, KNEE Left 04/04/2024    Procedure: ROBOTIC ARTHROPLASTY, KNEE, TOTAL, LEFT;  Surgeon: Martin Vo MD;   Location: [facility] OR;  Service: Orthopedics;  Laterality: Left;  Suraj-Nithin    ROBOTIC ARTHROPLASTY, KNEE Right 8/15/2024    Procedure: ROBOTIC ARTHROPLASTY, KNEE, TOTAL;  Surgeon: Martin Vo MD;  Location: [facility] OR;  Service: Orthopedics;  Laterality: Right;  [place]-Nithin    TOTAL KNEE ARTHROPLASTY Right 08/15/2024    VASECTOMY  2012     No family history on file.    The 10-year CVD risk score (D'Agostino, et al., 2008) is: 17.1%    Values used to calculate the score:      Age: 59 years      Sex: Male      Diabetic: No      Tobacco smoker: No      Systolic Blood Pressure: 138 mmHg      Is BP treated: Yes      HDL Cholesterol: 56 mg/dL      Total Cholesterol: 186 mg/dL    All of your core healthy metrics are met.      Review of patient's allergies indicates:  No Known Allergies    Current Outpatient Medications:     acetaminophen (TYLENOL) 325 MG tablet, Take 325 mg by mouth every 6 (six) hours as needed for Pain., Disp: , Rfl:     ascorbic acid, vitamin C, (VITAMIN C) 1000 MG tablet, Take 1,000 mg by mouth once daily., Disp: , Rfl:     atorvastatin (LIPITOR) 20 MG tablet, Take 1 tablet (20 mg total) by mouth once daily., Disp: 90 tablet, Rfl: 3    cholecalciferol, vitamin D3, (VITAMIN D3) 25 mcg (1,000 unit) capsule, Take 1,000 Units by mouth once daily., Disp: , Rfl:     cyanocobalamin (VITAMIN B-12) 1000 MCG tablet, Take 100 mcg by mouth once daily., Disp: , Rfl:     diclofenac (CATAFLAM) 50 MG tablet, Take 1 tablet (50 mg total) by mouth 2 (two) times daily., Disp: 60 tablet, Rfl: 1    flaxseed oil 1,000 mg Cap, Take by mouth., Disp: , Rfl:     ginkgo biloba 60 mg Cap, Take by mouth., Disp: , Rfl:     gluc hull/chondro hull A/vit C/Mn (GLUCOSAMINE 1500 COMPLEX ORAL), Take by mouth., Disp: , Rfl:     losartan (COZAAR) 50 MG tablet, Take 1 tablet (50 mg total) by mouth once daily., Disp: 90 tablet, Rfl: 3    multivit-min/FA/lycopen/lutein (CENTRUM SILVER MEN ORAL), Take by mouth., Disp: , Rfl:     mupirocin  (BACTROBAN) 2 % ointment, , Disp: , Rfl:     omega 3-dha-epa-fish oil 1,000 mg (120 mg-180 mg) Cap, Take by mouth., Disp: , Rfl:     omeprazole (PRILOSEC) 40 MG capsule, Take 1 capsule (40 mg total) by mouth once daily., Disp: 90 capsule, Rfl: 3    temazepam (RESTORIL) 15 mg Cap, Take 1 capsule (15 mg total) by mouth nightly as needed (INSOMNIA)., Disp: 30 capsule, Rfl: 3    terbinafine HCL (LAMISIL) 250 mg tablet, 1 po daily, Disp: 90 tablet, Rfl: 0    TURMERIC ORAL, Take by mouth., Disp: , Rfl:     vitamin E 400 UNIT capsule, Take 400 Units by mouth once daily., Disp: , Rfl:     propranoloL (INDERAL) 40 MG tablet, Take 2 tablets (80 mg total) by mouth once daily., Disp: 180 tablet, Rfl: 3    traMADoL (ULTRAM) 50 mg tablet, Take 1 tablet (50 mg total) by mouth every 6 (six) hours as needed for Pain. (Patient not taking: Reported on 1/8/2025), Disp: 28 tablet, Rfl: 0    Review of Systems   Constitutional:  Negative for activity change, appetite change, chills, fatigue, fever and unexpected weight change.   HENT:  Negative for ear pain, hearing loss, rhinorrhea and trouble swallowing.    Eyes:  Negative for pain, discharge and visual disturbance.   Respiratory:  Negative for apnea, cough, chest tightness, shortness of breath and wheezing.    Cardiovascular:  Negative for chest pain, palpitations and leg swelling.   Gastrointestinal:  Negative for abdominal pain, blood in stool, constipation, diarrhea, nausea, vomiting and reflux.   Endocrine: Negative for cold intolerance, heat intolerance, polydipsia and polyuria.   Genitourinary:  Negative for bladder incontinence, difficulty urinating, dysuria, erectile dysfunction, frequency, hematuria, testicular pain and urgency.   Musculoskeletal:  Positive for arthralgias and back pain. Negative for gait problem, joint swelling, myalgias and neck pain.   Neurological:  Positive for tremors. Negative for dizziness, seizures, weakness, numbness and headaches.  "  Psychiatric/Behavioral:  Negative for agitation, behavioral problems, confusion, dysphoric mood and hallucinations. The patient is not nervous/anxious.            Objective:      Vitals:    01/08/25 1118 01/08/25 1121   BP: (!) 150/94 138/88   Pulse: 78    SpO2: 98%    Weight: 114.8 kg (253 lb)    Height: 5' 10" (1.778 m)      Physical Exam  Vitals and nursing note reviewed.   Constitutional:       General: He is not in acute distress.     Appearance: Normal appearance. He is well-developed. He is obese. He is not toxic-appearing.   HENT:      Head: Normocephalic and atraumatic.      Right Ear: Tympanic membrane and external ear normal.      Left Ear: Tympanic membrane and external ear normal.      Nose: Nose normal.      Mouth/Throat:      Pharynx: Oropharynx is clear. No posterior oropharyngeal erythema.   Eyes:      Pupils: Pupils are equal, round, and reactive to light.   Neck:      Thyroid: No thyromegaly.      Vascular: No carotid bruit.   Cardiovascular:      Rate and Rhythm: Normal rate and regular rhythm.      Heart sounds: Normal heart sounds. No murmur heard.  Pulmonary:      Effort: Pulmonary effort is normal.      Breath sounds: Normal breath sounds. No wheezing or rales.   Abdominal:      General: Bowel sounds are normal. There is no distension.      Palpations: Abdomen is soft.      Tenderness: There is no abdominal tenderness.   Musculoskeletal:         General: No tenderness or deformity. Normal range of motion.      Cervical back: Normal range of motion and neck supple.      Lumbar back: Normal. No spasms.      Comments: Bends 90 degrees at  waist, shoulders and knees have full range of motion, no pitting edema to lower extremities, bilateral TKRs have good flexion and extension, left knee is more swollen than the right.   Lymphadenopathy:      Cervical: No cervical adenopathy.   Skin:     General: Skin is warm and dry.      Findings: No rash.   Neurological:      General: No focal deficit " present.      Mental Status: He is alert and oriented to person, place, and time. Mental status is at baseline.      Cranial Nerves: No cranial nerve deficit.      Coordination: Coordination normal.      Comments: Very fine intention tremor to both hands and neck   Psychiatric:         Mood and Affect: Mood normal.         Behavior: Behavior normal.         Thought Content: Thought content normal.         Judgment: Judgment normal.       Physical Exam    Vitals: Weight: 253 lbs.             Assessment:       1. Wellness examination    2. Elevated liver enzymes    3. Intention tremor    4. Pure hypercholesterolemia    5. Primary hypertension    6. Gastroesophageal reflux disease without esophagitis    7. Primary osteoarthritis of both shoulders    8. Status post bilateral knee replacements    9. Primary insomnia         Plan:       Primary hypertension  Blood pressure well controlled currently  Elevated liver enzymes  -     Comprehensive Metabolic Panel; Future; Expected date: 01/08/2025  AST 36 ALT 67, repeat liver enzymes in 3 months  Intention tremor  Very mild intention tremor noted, change propranolol to 40 mg p.o. b.i.d.  Pure hypercholesterolemia  Cholesterol excellent at 186 HDL 56   continue atorvastatin  Gastroesophageal reflux disease without esophagitis  Omeprazole working well  Primary osteoarthritis of both shoulders  Diclofenac 50 mg  Status post bilateral knee replacements  Still having residual pain in the left TKR greater than the right TKR.  Diclofenac 50 mg per Dr. Vo, still going to physical therapy, not releasde back to work at  Primary insomnia  Restoril 50 mg HS very effective    Assessment & Plan    IMPRESSION:  - Assessed progress of bilateral knee replacements performed in April and August last year  - Noted ongoing stiffness and inflammation, particularly in left knee  - Evaluated effectiveness of current anti-inflammatory medication (Diclofenac)  - Considered patient's  inability to return to previous physically demanding job due to knee limitations  - Monitored liver function, noting slight elevation in enzymes, possibly due to medications  - Assessed worsening hand tremors; current propranolol regimen not fully effective  - Diagnosed lumbar strain from recent minor incident    RIGHT KNEE REPLACEMENT:  - Evaluated the patient's right knee, which was replaced in August last year.  - Noted less stiffness and inflammation compared to the left knee.  - Observed improved appearance with less swelling and ability to bend to 115 degrees.  - Evaluated right knee stiffness, noting it was less severe than the left knee.  - Observed more normal appearance with less swelling compared to the left knee.  - Assessed knee mobility.    LEFT KNEE REPLACEMENT:  - Assessed the left knee, which was replaced in April last year.  - Noted increased stiffness, inflammation, and swelling compared to the right knee.  - Observed puffier appearance with visible fluid and decreased mobility.  - Assessed left knee stiffness, noting it was more severe than the right knee.  - Observed puffier appearance with visible fluid and decreased mobility compared to the right knee.  - Evaluated knee mobility.    KNEE INFLAMMATION MANAGEMENT:  - Continued Diclofenac for inflammation management, switched from Celebrex.  - Advised continuation of physical therapy.  - Scheduled follow-up visit with Dr. Vo for early March.    GAIT AND MOBILITY ASSESSMENT:  - Evaluated the patient's gait and mobility.  - Noted the patient's ability to walk well most of the time, but uncertainty about distance capacity.  - Identified challenges with stairs, particularly when descending.  - Assessed the patient's ability to walk and use stairs.  - Advised continuation of physical therapy to improve mobility and stair navigation.    LUMBAR STRAIN:  - Diagnosed lumbar strain following patient's report of back pain after bending.  - Assessed for  sciatic nerve involvement.  - Prescribed Diclofenac for pain management.  - Recommend back exercises and core strengthening once pain subsides.  - Educated the patient on the importance of core strengthening exercises to prevent future back strain.    HAND TREMORS:  - Evaluated worsening tremors in hands and fingers over the last few months.  - Observed tremor, noting it's not severe enough to cause spilling.  - Assessed family history of tremors.  - Adjusted propranolol dosage to 40 mg twice daily, 1 in the morning and 1 at night.  - Discussed potential for stronger tremor medications, noting increased sedation as a possible side effect.  - Instructed the patient to contact the office if running out of propranolol refills.    HYPERLIPIDEMIA:  - Assessed cholesterol levels: total cholesterol 186, with high HDL and slightly elevated LDL.  - Evaluated overall cholesterol levels as good.  - Continued current cholesterol medication, to be taken at night as previously prescribed.  - Ordered liver function tests in 3 months.    GERD:  - Noted no current stomach problems reported by the patient.  - Continued Omeprazole as previously prescribed for GERD management.  - Educated the patient on the importance of maintaining Omeprazole while on Diclofenac to prevent stomach irritation.    WEIGHT MANAGEMENT:  - Reduced calorie intake to address recent weigh  t gain.  - Limited consumption of salty snacks.    MEDICATIONS/SUPPLEMENTS:  - Continued Diclofenac: Take as previously prescribed.    FOLLOW UP:  - Scheduled follow up in 3 months for labs.  - Scheduled follow up in 6 months.         Follow up in about 6 months (around 7/8/2025), or Knee replacement, tremor, hypertension.        This note was generated with the assistance of ambient listening technology. Verbal consent was obtained by the patient and accompanying visitor(s) for the recording of patient appointment to facilitate this note. I attest to having reviewed and  edited the generated note for accuracy, though some syntax or spelling errors may persist. Please contact the author of this note for any clarification.      1/8/2025 Titus Graham

## 2025-02-18 DIAGNOSIS — G25.2 INTENTION TREMOR: ICD-10-CM

## 2025-02-18 RX ORDER — PROPRANOLOL HYDROCHLORIDE 40 MG/1
80 TABLET ORAL DAILY
Qty: 180 TABLET | Refills: 3 | Status: SHIPPED | OUTPATIENT
Start: 2025-02-18 | End: 2025-08-17

## 2025-03-05 ENCOUNTER — OFFICE VISIT (OUTPATIENT)
Dept: ORTHOPEDICS | Facility: CLINIC | Age: 60
End: 2025-03-05
Payer: COMMERCIAL

## 2025-03-05 VITALS — BODY MASS INDEX: 35.79 KG/M2 | HEIGHT: 70 IN | WEIGHT: 250 LBS

## 2025-03-05 DIAGNOSIS — Z96.653 HISTORY OF TOTAL KNEE ARTHROPLASTY, BILATERAL: Primary | ICD-10-CM

## 2025-03-05 PROCEDURE — 3008F BODY MASS INDEX DOCD: CPT | Mod: CPTII,S$GLB,, | Performed by: ORTHOPAEDIC SURGERY

## 2025-03-05 PROCEDURE — 99999 PR PBB SHADOW E&M-EST. PATIENT-LVL IV: CPT | Mod: PBBFAC,,, | Performed by: ORTHOPAEDIC SURGERY

## 2025-03-05 PROCEDURE — 4010F ACE/ARB THERAPY RXD/TAKEN: CPT | Mod: CPTII,S$GLB,, | Performed by: ORTHOPAEDIC SURGERY

## 2025-03-05 PROCEDURE — 1159F MED LIST DOCD IN RCRD: CPT | Mod: CPTII,S$GLB,, | Performed by: ORTHOPAEDIC SURGERY

## 2025-03-05 PROCEDURE — 1160F RVW MEDS BY RX/DR IN RCRD: CPT | Mod: CPTII,S$GLB,, | Performed by: ORTHOPAEDIC SURGERY

## 2025-03-05 PROCEDURE — 99213 OFFICE O/P EST LOW 20 MIN: CPT | Mod: S$GLB,,, | Performed by: ORTHOPAEDIC SURGERY

## 2025-03-05 RX ORDER — DICLOFENAC POTASSIUM 50 MG/1
50 TABLET, FILM COATED ORAL 2 TIMES DAILY
Qty: 60 TABLET | Refills: 3 | Status: SHIPPED | OUTPATIENT
Start: 2025-03-05

## 2025-03-05 NOTE — PROGRESS NOTES
Subjective:       Patient ID: Trino Head is a 60 y.o. male.    Chief Complaint: Pain of the Left Knee (Follow up for 3 month bilateral TKA f/u. Lt TKA 4/4/24 and Rt TKA 8/15/24, pain is getting better, has swelling, ) and Pain of the Right Knee      History of Present Illness    Prior to meeting with the patient I reviewed the medical chart in [facility]. This included reviewing the previous progress notes from our office, review of the patient's last appointment with their primary care provider, review of any visits to the emergency room, and review of any pain management appointments or procedures.   Status post bilateral total knee replacements right knee done in August of 2024 this has some occasional swelling and pain but a progressively is improving and he is quite pleased    Current Medications  Current Medications[1]    Allergies  Review of patient's allergies indicates:  No Known Allergies    Past Medical History  Past Medical History:   Diagnosis Date    Arthritis     Digestive disorder     Hypercholesteremia     Hypertension     Renal disorder     kidney stone       Surgical History  Past Surgical History:   Procedure Laterality Date    COLONOSCOPY  2016    Dr. [facility]auhan[facility] 10 years    ROBOTIC ARTHROPLASTY, KNEE Left 04/04/2024    Procedure: ROBOTIC ARTHROPLASTY, KNEE, TOTAL, LEFT;  Surgeon: Martin Vo MD;  Location: [facility];  Service: Orthopedics;  Laterality: Left;  [place]    ROBOTIC ARTHROPLASTY, KNEE Right 8/15/2024    Procedure: ROBOTIC ARTHROPLASTY, KNEE, TOTAL;  Surgeon: Martin Vo MD;  Location: [facility];  Service: Orthopedics;  Laterality: Right;  [place]    TOTAL KNEE ARTHROPLASTY Right 08/15/2024    VASECTOMY  2012       Family History:   No family history on file.    Social History:   Social History[2]    Hospitalization/Major Diagnostic Procedure:     Review of Systems     General/Constitutional:  Chills denies. Fatigue denies. Fever denies. Weight gain denies. Weight  "loss denies.    Respiratory:  Shortness of breath denies.    Cardiovascular:  Chest pain denies.    Gastrointestinal:  Constipation denies. Diarrhea denies. Nausea denies. Vomiting denies.     Hematology:  Easy bruising denies. Prolonged bleeding denies.     Genitourinary:  Frequent urination denies. Pain in lower back denies. Painful urination denies.     Musculoskeletal:  See HPI for details    Skin:  Rash denies.    Neurologic:  Dizziness denies. Gait abnormalities denies. Seizures denies. Tingling/Numbess denies.    Psychiatric:  Anxiety denies. Depressed mood denies.     Objective:   Vital Signs: There were no vitals filed for this visit.     Physical Exam      General Examination:     Constitutional: The patient is alert and oriented to lace person and time. Mood is pleasant.     Head/Face: Normal facial features normal eyebrows    Eyes: Normal extraocular motion bilaterally    Lungs: Respirations are equal and unlabored    Gait is coordinated.    Cardiovascular: There are no swelling or varicosities present.    Lymphatic: Negative for adenopathy    Skin: Normal    Neurological: Level of consciousness normal. Oriented to place person and time and situation    Psychiatric: Oriented to time place person and situation    Incisions both knees very well he will full extension both knees flexion past 100° both knees no instability no point tenderness or swelling  XRAY Report/ Interpretation:       Assessment:       1. History of total knee arthroplasty, bilateral        Plan:       Trino Lucero" was seen today for pain and pain.    Diagnoses and all orders for this visit:    History of total knee arthroplasty, bilateral         Follow up for 4 month f/u - bilateral TKA's.    Sedentary activity advised observation return 4 months with AP lateral and sunrise views both knees      Treatment options were discussed with regards to the nature of the medical condition. Conservative pain intervention and surgical options " were discussed in detail. The probability of success of each separate treatment option was discussed. The patient expressed a clear understanding of the treatment options. With regards to surgery, the procedure risk, benefits, complications, and outcomes were discussed. No guarantees were given with regards to surgical outcome.   The risk of complications, morbidity, and mortality of patient management decisions have been made at the time of this visit. These are associated with the patient's problems, diagnostic procedures and treatment options. This includes the possible management options selected and those considered but not selected by the patient after shared medical decision making we discussed with the patient.     This note was created using Dragon voice recognition software that occasionally misinterpreted phrases or words.         [1]   Current Outpatient Medications   Medication Sig Dispense Refill    acetaminophen (TYLENOL) 325 MG tablet Take 325 mg by mouth every 6 (six) hours as needed for Pain.      ascorbic acid, vitamin C, (VITAMIN C) 1000 MG tablet Take 1,000 mg by mouth once daily.      atorvastatin (LIPITOR) 20 MG tablet Take 1 tablet (20 mg total) by mouth once daily. 90 tablet 3    cholecalciferol, vitamin D3, (VITAMIN D3) 25 mcg (1,000 unit) capsule Take 1,000 Units by mouth once daily.      cyanocobalamin (VITAMIN B-12) 1000 MCG tablet Take 100 mcg by mouth once daily.      diclofenac (CATAFLAM) 50 MG tablet Take 1 tablet (50 mg total) by mouth 2 (two) times daily. 60 tablet 1    flaxseed oil 1,000 mg Cap Take by mouth.      ginkgo biloba 60 mg Cap Take by mouth.      gluc hull/chondro hull A/vit C/Mn (GLUCOSAMINE 1500 COMPLEX ORAL) Take by mouth.      losartan (COZAAR) 50 MG tablet Take 1 tablet (50 mg total) by mouth once daily. 90 tablet 3    multivit-min/FA/lycopen/lutein (CENTRUM SILVER MEN ORAL) Take by mouth.      omega 3-dha-epa-fish oil 1,000 mg (120 mg-180 mg) Cap Take by mouth.       omeprazole (PRILOSEC) 40 MG capsule Take 1 capsule (40 mg total) by mouth once daily. 90 capsule 3    propranoloL (INDERAL) 40 MG tablet Take 2 tablets (80 mg total) by mouth once daily. 180 tablet 3    temazepam (RESTORIL) 15 mg Cap Take 1 capsule (15 mg total) by mouth nightly as needed (INSOMNIA). 30 capsule 3    terbinafine HCL (LAMISIL) 250 mg tablet 1 po daily 90 tablet 0    TURMERIC ORAL Take by mouth.      vitamin E 400 UNIT capsule Take 400 Units by mouth once daily.      mupirocin (BACTROBAN) 2 % ointment  (Patient not taking: Reported on 3/5/2025)      traMADoL (ULTRAM) 50 mg tablet Take 1 tablet (50 mg total) by mouth every 6 (six) hours as needed for Pain. (Patient not taking: Reported on 3/5/2025) 28 tablet 0     No current facility-administered medications for this visit.   [2]   Social History  Socioeconomic History    Marital status:    Tobacco Use    Smoking status: Never    Smokeless tobacco: Never   Substance and Sexual Activity    Alcohol use: Yes     Alcohol/week: 1.0 standard drink of alcohol     Types: 1 Cans of beer per week     Comment: socially    Drug use: Never    Sexual activity: Yes     Partners: Female     Birth control/protection: See Surgical Hx     Social Drivers of Health     Financial Resource Strain: Patient Declined (12/14/2023)    Overall Financial Resource Strain (CARDIA)     Difficulty of Paying Living Expenses: Patient declined   Food Insecurity: Patient Declined (12/14/2023)    Hunger Vital Sign     Worried About Running Out of Food in the Last Year: Patient declined     Ran Out of Food in the Last Year: Patient declined   Transportation Needs: Patient Declined (12/14/2023)    PRAPARE - Transportation     Lack of Transportation (Medical): Patient declined     Lack of Transportation (Non-Medical): Patient declined   Physical Activity: Unknown (12/14/2023)    Exercise Vital Sign     Days of Exercise per Week: Patient declined   Stress: Patient Declined (12/14/2023)     [facility]ational [facility] - Occupational Stress Questionnaire     Feeling of Stress : Patient declined   Housing Stability: Unknown (12/14/2023)    Housing Stability Vital Sign     Unable to Pay for Housing in the Last Year: Patient refused     Unstable Housing in the Last Year: Patient refused

## 2025-03-26 DIAGNOSIS — F51.01 PRIMARY INSOMNIA: Primary | ICD-10-CM

## 2025-03-26 RX ORDER — TEMAZEPAM 15 MG/1
15 CAPSULE ORAL NIGHTLY PRN
Qty: 30 CAPSULE | Refills: 3 | Status: SHIPPED | OUTPATIENT
Start: 2025-03-26

## 2025-04-09 ENCOUNTER — TELEPHONE (OUTPATIENT)
Dept: FAMILY MEDICINE | Facility: CLINIC | Age: 60
End: 2025-04-09
Payer: COMMERCIAL

## 2025-04-09 NOTE — TELEPHONE ENCOUNTER
----- Message from Tech Maddy sent at 4/9/2025  8:54 AM CDT -----    ----- Message -----  From: Mayelin Sullivan MA  Sent: 4/9/2025  12:00 AM CDT  To: Titus Graham Staff    Per Dr. Graham - create a remind me for labs in 3 months.

## 2025-04-11 ENCOUNTER — RESULTS FOLLOW-UP (OUTPATIENT)
Dept: FAMILY MEDICINE | Facility: CLINIC | Age: 60
End: 2025-04-11

## 2025-05-12 RX ORDER — LOSARTAN POTASSIUM 50 MG/1
50 TABLET ORAL DAILY
Qty: 90 TABLET | Refills: 3 | Status: SHIPPED | OUTPATIENT
Start: 2025-05-12

## 2025-06-23 ENCOUNTER — TELEPHONE (OUTPATIENT)
Dept: FAMILY MEDICINE | Facility: CLINIC | Age: 60
End: 2025-06-23
Payer: COMMERCIAL

## 2025-06-23 DIAGNOSIS — R74.8 ELEVATED LIVER ENZYMES: ICD-10-CM

## 2025-06-23 DIAGNOSIS — Z79.899 ENCOUNTER FOR LONG-TERM (CURRENT) USE OF MEDICATIONS: ICD-10-CM

## 2025-06-23 DIAGNOSIS — I10 PRIMARY HYPERTENSION: ICD-10-CM

## 2025-06-23 DIAGNOSIS — Z00.00 WELLNESS EXAMINATION: Primary | ICD-10-CM

## 2025-07-07 ENCOUNTER — OFFICE VISIT (OUTPATIENT)
Dept: ORTHOPEDICS | Facility: CLINIC | Age: 60
End: 2025-07-07
Payer: COMMERCIAL

## 2025-07-07 VITALS — HEIGHT: 70 IN | WEIGHT: 250 LBS | BODY MASS INDEX: 35.79 KG/M2

## 2025-07-07 DIAGNOSIS — Z96.653 HISTORY OF TOTAL KNEE ARTHROPLASTY, BILATERAL: Primary | ICD-10-CM

## 2025-07-07 PROCEDURE — 3008F BODY MASS INDEX DOCD: CPT | Mod: CPTII,S$GLB,, | Performed by: ORTHOPAEDIC SURGERY

## 2025-07-07 PROCEDURE — 1159F MED LIST DOCD IN RCRD: CPT | Mod: CPTII,S$GLB,, | Performed by: ORTHOPAEDIC SURGERY

## 2025-07-07 PROCEDURE — 3066F NEPHROPATHY DOC TX: CPT | Mod: CPTII,S$GLB,, | Performed by: ORTHOPAEDIC SURGERY

## 2025-07-07 PROCEDURE — 4010F ACE/ARB THERAPY RXD/TAKEN: CPT | Mod: CPTII,S$GLB,, | Performed by: ORTHOPAEDIC SURGERY

## 2025-07-07 PROCEDURE — 99213 OFFICE O/P EST LOW 20 MIN: CPT | Mod: S$GLB,,, | Performed by: ORTHOPAEDIC SURGERY

## 2025-07-07 PROCEDURE — 1160F RVW MEDS BY RX/DR IN RCRD: CPT | Mod: CPTII,S$GLB,, | Performed by: ORTHOPAEDIC SURGERY

## 2025-07-07 PROCEDURE — 3061F NEG MICROALBUMINURIA REV: CPT | Mod: CPTII,S$GLB,, | Performed by: ORTHOPAEDIC SURGERY

## 2025-07-07 PROCEDURE — 99999 PR PBB SHADOW E&M-EST. PATIENT-LVL IV: CPT | Mod: PBBFAC,,, | Performed by: ORTHOPAEDIC SURGERY

## 2025-07-07 NOTE — PROGRESS NOTES
Subjective:       Patient ID: Trino Head is a 60 y.o. male.    Chief Complaint: Pain of the Right Knee (Patient is here for a follow up on bilateral TKA's 4.4.24 & 8.15.24. States pain has improved since last visit, has some stiffness and some inflammation in the left knee ) and Pain of the Left Knee      History of Present Illness    Prior to meeting with the patient I reviewed the medical chart in [facility]. This included reviewing the previous progress notes from our office, review of the patient's last appointment with their primary care provider, review of any visits to the emergency room, and review of any pain management appointments or procedures.   Román comes in today for follow-up for his bilateral total knee arthroplasties.  He had his left knee done first April 2024 and his right knee done last in August 2024.  Those have done very well for him.  He is very pleased with his postoperative result.  He reports his pain is much improved now from where he was preoperatively.    Current Medications  Current Medications[1]    Allergies  Review of patient's allergies indicates:  No Known Allergies    Past Medical History  Past Medical History:   Diagnosis Date    Arthritis     Digestive disorder     Hypercholesteremia     Hypertension     Renal disorder     kidney stone       Surgical History  Past Surgical History:   Procedure Laterality Date    COLONOSCOPY  2016    Dr. Chauhan[facility] 10 years    ROBOTIC ARTHROPLASTY, KNEE Left 04/04/2024    Procedure: ROBOTIC ARTHROPLASTY, KNEE, TOTAL, LEFT;  Surgeon: Martin Vo MD;  Location: [facility];  Service: Orthopedics;  Laterality: Left;  [place]    ROBOTIC ARTHROPLASTY, KNEE Right 8/15/2024    Procedure: ROBOTIC ARTHROPLASTY, KNEE, TOTAL;  Surgeon: Martin Vo MD;  Location: [facility];  Service: Orthopedics;  Laterality: Right;  [place]    TOTAL KNEE ARTHROPLASTY Right 08/15/2024    VASECTOMY  2012       Family History:   No family history on  file.    Social History:   Social History[2]    Hospitalization/Major Diagnostic Procedure:     Review of Systems     General/Constitutional:  Chills denies. Fatigue denies. Fever denies. Weight gain denies. Weight loss denies.    Respiratory:  Shortness of breath denies.    Cardiovascular:  Chest pain denies.    Gastrointestinal:  Constipation denies. Diarrhea denies. Nausea denies. Vomiting denies.     Hematology:  Easy bruising denies. Prolonged bleeding denies.     Genitourinary:  Frequent urination denies. Pain in lower back denies. Painful urination denies.     Musculoskeletal:  See HPI for details    Skin:  Rash denies.    Neurologic:  Dizziness denies. Gait abnormalities denies. Seizures denies. Tingling/Numbess denies.    Psychiatric:  Anxiety denies. Depressed mood denies.     Objective:   Vital Signs: There were no vitals filed for this visit.     Physical Exam      General Examination:     Constitutional: The patient is alert and oriented to lace person and time. Mood is pleasant.     Head/Face: Normal facial features normal eyebrows    Eyes: Normal extraocular motion bilaterally    Lungs: Respirations are equal and unlabored    Gait is coordinated.    Cardiovascular: There are no swelling or varicosities present.    Lymphatic: Negative for adenopathy    Skin: Normal    Neurological: Level of consciousness normal. Oriented to place person and time and situation    Psychiatric: Oriented to time place person and situation      Bilateral knee exam: Skin to bilateral knees clean dry and intact.  No erythema or ecchymosis.  No signs or symptoms of infection. Bilateral anterior midline incisions well healed without wound dehiscence or drainage.  Negative Homans bilaterally.  Bilateral knee range of motion symmetric at 0-110 degrees.  Both knees stable to varus and valgus stresses.  He is neurovascularly intact throughout bilateral lower extremities.  He can weightbear as tolerated on bilateral lower  "extremities.  Nonantalgic gait.  Ambulates without an aid.      XRAY Report/ Interpretation:No new radiographs taken on today's clinic visit.    Assessment:       1. History of total knee arthroplasty, bilateral        Plan:       Trino Lucero" was seen today for pain and pain.    Diagnoses and all orders for this visit:    History of total knee arthroplasty, bilateral         Follow up in about 1 year (around 7/7/2026).  This is to attest that the assistant, Mata Kwon served in the capacity as a scribe for this patient's encounter.  This is also verify that I have reviewed the patient's history and  formulated the treatment plan for this patient.  I have evaluated this patient and formulated a treatment plan for this patient visit.  The treatment plan and medical decision-making is outlined below.    Patient is doing very well status post bilateral total knee arthroplasty.  His pain is much improved from where he was preoperatively.  His ability to independently perform ADLs without pain has improved as well.  I advised he can continue /resume his regular activities of daily living as pain tolerates without restriction.  We will see him back on an annual basis with repeat radiographs.  I did advise that he can follow up sooner if any issues or concerns arise.  He verbalized understanding.    Treatment options were discussed with regards to the nature of the medical condition. Conservative pain intervention and surgical options were discussed in detail. The probability of success of each separate treatment option was discussed. The patient expressed a clear understanding of the treatment options. With regards to surgery, the procedure risk, benefits, complications, and outcomes were discussed. No guarantees were given with regards to surgical outcome.   The risk of complications, morbidity, and mortality of patient management decisions have been made at the time of this visit. These are associated with the " patient's problems, diagnostic procedures and treatment options. This includes the possible management options selected and those considered but not selected by the patient after shared medical decision making we discussed with the patient.     This note was created using Dragon voice recognition software that occasionally misinterpreted phrases or words.         [1]   Current Outpatient Medications   Medication Sig Dispense Refill    acetaminophen (TYLENOL) 325 MG tablet Take 325 mg by mouth every 6 (six) hours as needed for Pain.      ascorbic acid, vitamin C, (VITAMIN C) 1000 MG tablet Take 1,000 mg by mouth once daily.      atorvastatin (LIPITOR) 20 MG tablet Take 1 tablet (20 mg total) by mouth once daily. 90 tablet 3    cholecalciferol, vitamin D3, (VITAMIN D3) 25 mcg (1,000 unit) capsule Take 1,000 Units by mouth once daily.      cyanocobalamin (VITAMIN B-12) 1000 MCG tablet Take 100 mcg by mouth once daily.      diclofenac (CATAFLAM) 50 MG tablet Take 1 tablet (50 mg total) by mouth 2 (two) times daily. 60 tablet 3    flaxseed oil 1,000 mg Cap Take by mouth.      ginkgo biloba 60 mg Cap Take by mouth.      gluc hull/chondro hull A/vit C/Mn (GLUCOSAMINE 1500 COMPLEX ORAL) Take by mouth.      losartan (COZAAR) 50 MG tablet Take 1 tablet (50 mg total) by mouth once daily. 90 tablet 3    multivit-min/FA/lycopen/lutein (CENTRUM SILVER MEN ORAL) Take by mouth.      omega 3-dha-epa-fish oil 1,000 mg (120 mg-180 mg) Cap Take by mouth.      omeprazole (PRILOSEC) 40 MG capsule Take 1 capsule (40 mg total) by mouth once daily. 90 capsule 3    propranoloL (INDERAL) 40 MG tablet Take 2 tablets (80 mg total) by mouth once daily. 180 tablet 3    temazepam (RESTORIL) 15 mg Cap Take 1 capsule (15 mg total) by mouth nightly as needed (INSOMNIA). 30 capsule 3    terbinafine HCL (LAMISIL) 250 mg tablet 1 po daily 90 tablet 0    TURMERIC ORAL Take by mouth.      urea (CARMOL) 40 % Crea Apply topically nightly and occlude with wrap  85 each 0    vitamin E 400 UNIT capsule Take 400 Units by mouth once daily.      mupirocin (BACTROBAN) 2 % ointment  (Patient not taking: Reported on 7/7/2025)      traMADoL (ULTRAM) 50 mg tablet Take 1 tablet (50 mg total) by mouth every 6 (six) hours as needed for Pain. (Patient not taking: Reported on 3/5/2025) 28 tablet 0     No current facility-administered medications for this visit.   [2]   Social History  Socioeconomic History    Marital status:    Tobacco Use    Smoking status: Never    Smokeless tobacco: Never   Substance and Sexual Activity    Alcohol use: Yes     Alcohol/week: 1.0 standard drink of alcohol     Types: 1 Cans of beer per week     Comment: socially    Drug use: Never    Sexual activity: Yes     Partners: Female     Birth control/protection: See Surgical Hx     Social Drivers of Health     Financial Resource Strain: Low Risk  (7/2/2025)    Overall Financial Resource Strain (CARDIA)     Difficulty of Paying Living Expenses: Not hard at all   Food Insecurity: No Food Insecurity (7/2/2025)    Hunger Vital Sign     Worried About Running Out of Food in the Last Year: Never true     Ran Out of Food in the Last Year: Never true   Transportation Needs: No Transportation Needs (7/2/2025)    PRAPARE - Transportation     Lack of Transportation (Medical): No     Lack of Transportation (Non-Medical): No   Physical Activity: Insufficiently Active (7/2/2025)    Exercise Vital Sign     Days of Exercise per Week: 1 day     Minutes of Exercise per Session: 60 min   Stress: Stress Concern Present (7/2/2025)    [employer] [place] [employer] - Occupational Stress Questionnaire     Feeling of Stress : To some extent   Housing Stability: Low Risk  (7/2/2025)    Housing Stability Vital Sign     Unable to Pay for Housing in the Last Year: No     Number of Times Moved in the Last Year: 0     Homeless in the Last Year: No

## 2025-07-09 ENCOUNTER — OFFICE VISIT (OUTPATIENT)
Dept: FAMILY MEDICINE | Facility: CLINIC | Age: 60
End: 2025-07-09
Payer: COMMERCIAL

## 2025-07-09 VITALS
OXYGEN SATURATION: 99 % | HEART RATE: 57 BPM | DIASTOLIC BLOOD PRESSURE: 100 MMHG | BODY MASS INDEX: 36.79 KG/M2 | SYSTOLIC BLOOD PRESSURE: 164 MMHG | HEIGHT: 70 IN | WEIGHT: 257 LBS

## 2025-07-09 DIAGNOSIS — G25.2 INTENTION TREMOR: ICD-10-CM

## 2025-07-09 DIAGNOSIS — N20.0 NEPHROLITHIASIS: ICD-10-CM

## 2025-07-09 DIAGNOSIS — Z96.653 STATUS POST BILATERAL KNEE REPLACEMENTS: ICD-10-CM

## 2025-07-09 DIAGNOSIS — F51.01 PRIMARY INSOMNIA: ICD-10-CM

## 2025-07-09 DIAGNOSIS — I10 PRIMARY HYPERTENSION: Primary | ICD-10-CM

## 2025-07-09 DIAGNOSIS — Z12.5 SPECIAL SCREENING FOR MALIGNANT NEOPLASM OF PROSTATE: ICD-10-CM

## 2025-07-09 DIAGNOSIS — E78.00 PURE HYPERCHOLESTEROLEMIA: ICD-10-CM

## 2025-07-09 DIAGNOSIS — K21.9 GASTROESOPHAGEAL REFLUX DISEASE WITHOUT ESOPHAGITIS: ICD-10-CM

## 2025-07-09 DIAGNOSIS — E78.49 OTHER HYPERLIPIDEMIA: ICD-10-CM

## 2025-07-09 DIAGNOSIS — E66.01 SEVERE OBESITY (BMI 35.0-39.9) WITH COMORBIDITY: ICD-10-CM

## 2025-07-09 DIAGNOSIS — F34.1 DYSTHYMIA: ICD-10-CM

## 2025-07-09 PROCEDURE — 3061F NEG MICROALBUMINURIA REV: CPT | Mod: CPTII,S$GLB,, | Performed by: FAMILY MEDICINE

## 2025-07-09 PROCEDURE — 4010F ACE/ARB THERAPY RXD/TAKEN: CPT | Mod: CPTII,S$GLB,, | Performed by: FAMILY MEDICINE

## 2025-07-09 PROCEDURE — 3066F NEPHROPATHY DOC TX: CPT | Mod: CPTII,S$GLB,, | Performed by: FAMILY MEDICINE

## 2025-07-09 PROCEDURE — 3077F SYST BP >= 140 MM HG: CPT | Mod: CPTII,S$GLB,, | Performed by: FAMILY MEDICINE

## 2025-07-09 PROCEDURE — 3080F DIAST BP >= 90 MM HG: CPT | Mod: CPTII,S$GLB,, | Performed by: FAMILY MEDICINE

## 2025-07-09 PROCEDURE — 1159F MED LIST DOCD IN RCRD: CPT | Mod: CPTII,S$GLB,, | Performed by: FAMILY MEDICINE

## 2025-07-09 PROCEDURE — 3008F BODY MASS INDEX DOCD: CPT | Mod: CPTII,S$GLB,, | Performed by: FAMILY MEDICINE

## 2025-07-09 PROCEDURE — 99214 OFFICE O/P EST MOD 30 MIN: CPT | Mod: S$GLB,,, | Performed by: FAMILY MEDICINE

## 2025-07-09 RX ORDER — TEMAZEPAM 15 MG/1
15 CAPSULE ORAL NIGHTLY PRN
Qty: 30 CAPSULE | Refills: 3 | Status: SHIPPED | OUTPATIENT
Start: 2025-07-09

## 2025-07-09 RX ORDER — LOSARTAN POTASSIUM 100 MG/1
100 TABLET ORAL DAILY
Qty: 90 TABLET | Refills: 3 | Status: SHIPPED | OUTPATIENT
Start: 2025-07-09 | End: 2026-07-09

## 2025-07-09 RX ORDER — ATORVASTATIN CALCIUM 20 MG/1
20 TABLET, FILM COATED ORAL DAILY
Qty: 90 TABLET | Refills: 3 | Status: SHIPPED | OUTPATIENT
Start: 2025-07-09 | End: 2026-07-09

## 2025-07-09 RX ORDER — OMEPRAZOLE 40 MG/1
40 CAPSULE, DELAYED RELEASE ORAL DAILY
Qty: 90 CAPSULE | Refills: 3 | Status: SHIPPED | OUTPATIENT
Start: 2025-07-09 | End: 2026-07-09

## 2025-07-09 NOTE — PROGRESS NOTES
SUBJECTIVE:    Patient ID: Trino Head is a 60 y.o. male.    Chief Complaint: Follow-up (6 mo follow up/ lab work in Epic/ no bottles//mp)    Follow-up  Associated symptoms include headaches. Pertinent negatives include no arthralgias, chest pain, joint swelling, neck pain, vomiting or weakness.       History of Present Illness              Telephone on 06/23/2025   Component Date Value Ref Range Status    Cholesterol 07/01/2025 155  <200 mg/dL Final    HDL 07/01/2025 44  > OR = 40 mg/dL Final    Triglycerides 07/01/2025 89  <150 mg/dL Final    LDL Cholesterol 07/01/2025 93  mg/dL (calc) Final    HDL/Cholesterol Ratio 07/01/2025 3.5  <5.0 (calc) Final    Non HDL Chol. (LDL+VLDL) 07/01/2025 111  <130 mg/dL (calc) Final    Glucose 07/01/2025 94  65 - 99 mg/dL Final    BUN 07/01/2025 19  7 - 25 mg/dL Final    Creatinine 07/01/2025 1.08  0.70 - 1.35 mg/dL Final    eGFR 07/01/2025 79  > OR = 60 mL/min/1.73m2 Final    BUN/Creatinine Ratio 07/01/2025 SEE NOTE:  6 - 22 (calc) Final    Sodium 07/01/2025 139  135 - 146 mmol/L Final    Potassium 07/01/2025 4.7  3.5 - 5.3 mmol/L Final    Chloride 07/01/2025 104  98 - 110 mmol/L Final    CO2 07/01/2025 30  20 - 32 mmol/L Final    Calcium 07/01/2025 9.4  8.6 - 10.3 mg/dL Final    Total Protein 07/01/2025 6.3  6.1 - 8.1 g/dL Final    Albumin 07/01/2025 4.1  3.6 - 5.1 g/dL Final    Globulin, Total 07/01/2025 2.2  1.9 - 3.7 g/dL (calc) Final    Albumin/Globulin Ratio 07/01/2025 1.9  1.0 - 2.5 (calc) Final    Total Bilirubin 07/01/2025 0.5  0.2 - 1.2 mg/dL Final    Alkaline Phosphatase 07/01/2025 79  35 - 144 U/L Final    AST 07/01/2025 19  10 - 35 U/L Final    ALT 07/01/2025 28  9 - 46 U/L Final    WBC 07/01/2025 5.5  3.8 - 10.8 Thousand/uL Final    RBC 07/01/2025 4.57  4.20 - 5.80 Million/uL Final    Hemoglobin 07/01/2025 14.1  13.2 - 17.1 g/dL Final    Hematocrit 07/01/2025 44.4  38.5 - 50.0 % Final    MCV 07/01/2025 97.2  80.0 - 100.0 fL Final     MCH 07/01/2025 30.9  27.0 - 33.0 pg Final    MCHC 07/01/2025 31.8 (L)  32.0 - 36.0 g/dL Final    RDW 07/01/2025 14.0  11.0 - 15.0 % Final    Platelets 07/01/2025 276  140 - 400 Thousand/uL Final    MPV 07/01/2025 10.9  7.5 - 12.5 fL Final    Neutrophils, Abs 07/01/2025 3,075  1,500 - 7,800 cells/uL Final    Lymph # 07/01/2025 1,617  850 - 3,900 cells/uL Final    Mono # 07/01/2025 638  200 - 950 cells/uL Final    Eos # 07/01/2025 99  15 - 500 cells/uL Final    Baso # 07/01/2025 72  0 - 200 cells/uL Final    Neutrophils Relative 07/01/2025 55.9  % Final    Lymph % 07/01/2025 29.4  % Final    Mono % 07/01/2025 11.6  % Final    Eosinophil % 07/01/2025 1.8  % Final    Basophil % 07/01/2025 1.3  % Final    TSH w/reflex to FT4 07/01/2025 2.22  0.40 - 4.50 mIU/L Final    Color, UA 07/01/2025 YELLOW  YELLOW Final    Appearance, UA 07/01/2025 CLEAR  CLEAR Final    Specific Gravity, UA 07/01/2025 1.025  1.001 - 1.035 Final    pH, UA 07/01/2025 5.5  5.0 - 8.0 Final    Glucose, UA 07/01/2025 NEGATIVE  NEGATIVE Final    Bilirubin, UA 07/01/2025 NEGATIVE  NEGATIVE Final    Ketones, UA 07/01/2025 NEGATIVE  NEGATIVE Final    Occult Blood UA 07/01/2025 NEGATIVE  NEGATIVE Final    Protein, UA 07/01/2025 NEGATIVE  NEGATIVE Final    Nitrite, UA 07/01/2025 NEGATIVE  NEGATIVE Final    Leukocytes, UA 07/01/2025 NEGATIVE  NEGATIVE Final    WBC Casts, UA 07/01/2025 NONE SEEN  < OR = 5 /HPF Final    RBC Casts, UA 07/01/2025 0-2  < OR = 2 /HPF Final    Squam Epithel, UA 07/01/2025 NONE SEEN  < OR = 5 /HPF Final    Bacteria, UA 07/01/2025 NONE SEEN  NONE SEEN /HPF Final    Hyaline Casts, UA 07/01/2025 NONE SEEN  NONE SEEN /LPF Final    Service Cmt: 07/01/2025 SEE COMMENT   Final    Reflexive Urine Culture 07/01/2025 SEE COMMENT   Final    Creatinine, Urine 07/01/2025 216  20 - 320 mg/dL Final    Microalb, Ur 07/01/2025 0.5  See Note: mg/dL Final    Microalb/Creat Ratio 07/01/2025 2  <30 mg/g creat Final        Past Medical History:   Diagnosis Date    Arthritis     Digestive disorder     Hypercholesteremia     Hypertension     Renal disorder     kidney stone     Social History[1]  Past Surgical History:   Procedure Laterality Date    COLONOSCOPY  2016    Dr[facility] 10 years    ROBOTIC ARTHROPLASTY, KNEE Left 04/04/2024    Procedure: ROBOTIC ARTHROPLASTY, KNEE, TOTAL, LEFT;  Surgeon: Martin Vo MD;  Location: [facility];  Service: Orthopedics;  Laterality: Left;  Surja-Nithin    ROBOTIC ARTHROPLASTY, KNEE Right 8/15/2024    Procedure: ROBOTIC ARTHROPLASTY, KNEE, TOTAL;  Surgeon: Martin Vo MD;  Location: [facility];  Service: Orthopedics;  Laterality: Right;  Suraj-Nithin    TOTAL KNEE ARTHROPLASTY Right 08/15/2024    VASECTOMY  2012     No family history on file.    The 10-year CVD risk score (CLEMENTAgoino, et al., 2008) is: 24.7%    Values used to calculate the score:      Age: 60 years      Sex: Male      Diabetic: No      Tobacco smoker: No      Systolic Blood Pressure: 164 mmHg      Is BP treated: Yes      HDL Cholesterol: 44 mg/dL      Total Cholesterol: 155 mg/dL    All of your core healthy metrics are met.      Review of patient's allergies indicates:  No Known Allergies  Current Medications[2]    Review of Systems   Constitutional:  Negative for activity change and unexpected weight change.   HENT:  Negative for hearing loss, rhinorrhea and trouble swallowing.    Eyes:  Negative for discharge and visual disturbance.   Respiratory:  Positive for wheezing. Negative for chest tightness.    Cardiovascular:  Negative for chest pain and palpitations.   Gastrointestinal:  Negative for blood in stool, constipation, diarrhea and vomiting.   Endocrine: Negative for polydipsia and polyuria.   Genitourinary:  Negative for difficulty urinating, hematuria and urgency.   Musculoskeletal:  Negative for arthralgias, joint swelling and neck pain.   Neurological:  Positive for headaches. Negative for  "weakness.   Psychiatric/Behavioral:  Positive for dysphoric mood. Negative for confusion.            Objective:      Vitals:    07/09/25 0848 07/09/25 0856   BP: (!) 140/100 (!) 164/100   Pulse: (!) 57    SpO2: 99%    Weight: 116.6 kg (257 lb)    Height: 5' 10" (1.778 m)      Physical Exam  Vitals and nursing note reviewed.   Constitutional:       General: He is not in acute distress.     Appearance: Normal appearance. He is well-developed. He is obese. He is not toxic-appearing.   HENT:      Head: Normocephalic and atraumatic.      Right Ear: Tympanic membrane and external ear normal.      Left Ear: Tympanic membrane and external ear normal.      Nose: Nose normal.      Mouth/Throat:      Pharynx: Oropharynx is clear. No posterior oropharyngeal erythema.   Eyes:      Pupils: Pupils are equal, round, and reactive to light.   Neck:      Thyroid: No thyromegaly.      Vascular: No carotid bruit.   Cardiovascular:      Rate and Rhythm: Normal rate and regular rhythm.      Heart sounds: Normal heart sounds. No murmur heard.  Pulmonary:      Effort: Pulmonary effort is normal.      Breath sounds: Normal breath sounds. No wheezing or rales.   Abdominal:      General: Bowel sounds are normal. There is no distension.      Palpations: Abdomen is soft.      Tenderness: There is no abdominal tenderness.   Musculoskeletal:         General: No tenderness or deformity. Normal range of motion.      Cervical back: Normal range of motion and neck supple.      Lumbar back: Normal. No spasms.      Comments: Bends 90 degrees at  waist, shoulders and knees have full range of motion, no pitting edema to lower extremities, bilateral knee replacements good range of motion   Lymphadenopathy:      Cervical: No cervical adenopathy.   Skin:     General: Skin is warm and dry.      Findings: No rash.   Neurological:      General: No focal deficit present.      Mental Status: He is alert and oriented to person, place, and time. Mental status is at " baseline.      Cranial Nerves: No cranial nerve deficit.      Coordination: Coordination normal.      Comments: Very minimal fine tremor to neck and both hands   Psychiatric:         Mood and Affect: Mood is depressed.         Behavior: Behavior normal.         Thought Content: Thought content normal.         Judgment: Judgment normal.     Physical Exam                  Assessment:       1. Primary hypertension    2. Pure hypercholesterolemia    3. Other hyperlipidemia    4. Gastroesophageal reflux disease without esophagitis    5. Status post bilateral knee replacements    6. Primary insomnia    7. Intention tremor    8. Nephrolithiasis    9. Dysthymia    10. Severe obesity (BMI 35.0-39.9) with comorbidity         Plan:       Primary hypertension  Blood pressure not well controlled., increase losartan 100 mg daily, return in 2 weeks for nurse blood pressure visit  Pure hypercholesterolemia  -     atorvastatin (LIPITOR) 20 MG tablet; Take 1 tablet (20 mg total) by mouth once daily.  Dispense: 90 tablet; Refill: 3  Cholesterol excellent at 155 HDL 44 TG 89 LDL 93 continue atorvastatin  Other hyperlipidemia    Gastroesophageal reflux disease without esophagitis  -     omeprazole (PRILOSEC) 40 MG capsule; Take 1 capsule (40 mg total) by mouth once daily.  Dispense: 90 capsule; Refill: 3    Status post bilateral knee replacements  Knee replacements working well  Primary insomnia  -     temazepam (RESTORIL) 15 mg Cap; Take 1 capsule (15 mg total) by mouth nightly as needed (INSOMNIA).  Dispense: 30 capsule; Refill: 3    Intention tremor  Continue propranolol, no increase in meds for now  Nephrolithiasis  Asymptomatic  Dysthymia  Offered SSRI, patient declines at this time and will let me know if he changes his mind  Severe obesity (BMI 35.0-39.9) with comorbidity  Need to get back to walking more bicycle riding for exercise  Other orders  -     losartan (COZAAR) 100 MG tablet; Take 1 tablet (100 mg total) by mouth once  daily.  Dispense: 90 tablet; Refill: 3      Assessment & Plan              Follow up in about 2 weeks (around 7/23/2025), or 6 mo Dr BENITO egan, for BP Check-Up with Nurse.        This note was generated with the assistance of ambient listening technology. Verbal consent was obtained by the patient and accompanying visitor(s) for the recording of patient appointment to facilitate this note. I attest to having reviewed and edited the generated note for accuracy, though some syntax or spelling errors may persist. Please contact the author of this note for any clarification.      7/9/2025 Titus Graham             [1]  Social History  Socioeconomic History    Marital status:    Tobacco Use    Smoking status: Never    Smokeless tobacco: Never   Substance and Sexual Activity    Alcohol use: Yes     Alcohol/week: 1.0 standard drink of alcohol     Types: 1 Cans of beer per week     Comment: socially    Drug use: Never    Sexual activity: Yes     Partners: Female     Birth control/protection: See Surgical Hx     Social Drivers of Health     Financial Resource Strain: Low Risk  (7/2/2025)    Overall Financial Resource Strain (CARDIA)     Difficulty of Paying Living Expenses: Not hard at all   Food Insecurity: No Food Insecurity (7/2/2025)    Hunger Vital Sign     Worried About Running Out of Food in the Last Year: Never true     Ran Out of Food in the Last Year: Never true   Transportation Needs: No Transportation Needs (7/2/2025)    PRAPARE - Transportation     Lack of Transportation (Medical): No     Lack of Transportation (Non-Medical): No   Physical Activity: Insufficiently Active (7/2/2025)    Exercise Vital Sign     Days of Exercise per Week: 1 day     Minutes of Exercise per Session: 60 min   Stress: Stress Concern Present (7/2/2025)    Citizen of [place] [employer] - Occupational Stress Questionnaire     Feeling of Stress : To some extent   Housing Stability: Low Risk  (7/2/2025)     Housing Stability Vital Sign     Unable to Pay for Housing in the Last Year: No     Number of Times Moved in the Last Year: 0     Homeless in the Last Year: No   [2]    Current Outpatient Medications:     acetaminophen (TYLENOL) 325 MG tablet, Take 325 mg by mouth every 6 (six) hours as needed for Pain., Disp: , Rfl:     multivit-min/FA/lycopen/lutein (CENTRUM SILVER MEN ORAL), Take by mouth., Disp: , Rfl:     propranoloL (INDERAL) 40 MG tablet, Take 2 tablets (80 mg total) by mouth once daily., Disp: 180 tablet, Rfl: 3    atorvastatin (LIPITOR) 20 MG tablet, Take 1 tablet (20 mg total) by mouth once daily., Disp: 90 tablet, Rfl: 3    losartan (COZAAR) 100 MG tablet, Take 1 tablet (100 mg total) by mouth once daily., Disp: 90 tablet, Rfl: 3    omeprazole (PRILOSEC) 40 MG capsule, Take 1 capsule (40 mg total) by mouth once daily., Disp: 90 capsule, Rfl: 3    temazepam (RESTORIL) 15 mg Cap, Take 1 capsule (15 mg total) by mouth nightly as needed (INSOMNIA)., Disp: 30 capsule, Rfl: 3

## 2025-07-23 ENCOUNTER — CLINICAL SUPPORT (OUTPATIENT)
Dept: FAMILY MEDICINE | Facility: CLINIC | Age: 60
End: 2025-07-23
Payer: COMMERCIAL

## 2025-07-23 VITALS — SYSTOLIC BLOOD PRESSURE: 174 MMHG | DIASTOLIC BLOOD PRESSURE: 105 MMHG

## 2025-07-23 DIAGNOSIS — I10 PRIMARY HYPERTENSION: Primary | ICD-10-CM

## 2025-07-23 RX ORDER — AMLODIPINE BESYLATE 5 MG/1
5 TABLET ORAL DAILY
Qty: 30 TABLET | Refills: 0 | Status: SHIPPED | OUTPATIENT
Start: 2025-07-23

## 2025-07-23 NOTE — PROGRESS NOTES
Patient came in with BP cuff from home and home readings. BP machine got a reading of 174/105 and then 155/99 - cuff fits very tight on his arm. Checked in the office with the large cuff and reading was 146/92. Patient advised to buy the cuff 1 size up to make sure we are getting accurate readings. Bp still elevated. Per Dr. Graham - Add Amlodipine 5mg daily. Patient given new bp logs and told to f/u in 2 weeks.

## 2025-08-06 ENCOUNTER — CLINICAL SUPPORT (OUTPATIENT)
Dept: FAMILY MEDICINE | Facility: CLINIC | Age: 60
End: 2025-08-06
Payer: COMMERCIAL

## 2025-08-06 VITALS — DIASTOLIC BLOOD PRESSURE: 80 MMHG | SYSTOLIC BLOOD PRESSURE: 128 MMHG

## 2025-08-06 DIAGNOSIS — I10 PRIMARY HYPERTENSION: ICD-10-CM

## 2025-08-06 RX ORDER — AMLODIPINE BESYLATE 5 MG/1
5 TABLET ORAL DAILY
Qty: 30 TABLET | Refills: 1 | Status: SHIPPED | OUTPATIENT
Start: 2025-08-06

## 2025-08-06 NOTE — PROGRESS NOTES
Presents to clinic for 2 week BP check and home BP log review. @ 7/23/25 nurse visit [address]. Graham added amlodipine 5 mg. Home logs WNL, manual readings WNL. Instructed patient to continue the same and will call with any further instructions from Dr. Graham. Will now need a 90 day of the amlodipine prescription which was pended.

## 2025-08-08 ENCOUNTER — PATIENT MESSAGE (OUTPATIENT)
Dept: FAMILY MEDICINE | Facility: CLINIC | Age: 60
End: 2025-08-08
Payer: COMMERCIAL

## 2025-08-08 DIAGNOSIS — I10 PRIMARY HYPERTENSION: ICD-10-CM

## 2025-08-11 RX ORDER — AMLODIPINE BESYLATE 5 MG/1
5 TABLET ORAL DAILY
Qty: 90 TABLET | Refills: 3 | Status: SHIPPED | OUTPATIENT
Start: 2025-08-11

## (undated) DEVICE — BANDAGE ACE DOUBLE STER 6IN

## (undated) DEVICE — BONE PINS (3.2MM X 140MM)
Type: IMPLANTABLE DEVICE | Site: KNEE | Status: NON-FUNCTIONAL
Removed: 2024-08-15

## (undated) DEVICE — KIT DRAPE RIO ONE PIECE W/POCK

## (undated) DEVICE — BLADE SAW SGTL 21.2X85.5X1.2

## (undated) DEVICE — KIT TRIATHLON TIBIAL SIZER

## (undated) DEVICE — SUT #2 TI-CRON HGS-21 30IN

## (undated) DEVICE — GLOVE SENSICARE PI ALOE 8

## (undated) DEVICE — STAPLER SKIN ROTATING HEAD

## (undated) DEVICE — COVER TABLE 44X90 STERILE

## (undated) DEVICE — KIT VIZADISC KNEE TRACKING

## (undated) DEVICE — WRAP DEMAYO LEG STERILE

## (undated) DEVICE — PACK EXTREMITY ORTHOMAX

## (undated) DEVICE — KIT CHECKPOINT TIBIAL

## (undated) DEVICE — DRAPE STERI INSTRUMENT 1018

## (undated) DEVICE — BLADE SURG CARBON STEEL #10

## (undated) DEVICE — NDL SPINAL 18GX3.5 SPINOCAN

## (undated) DEVICE — GLOVE SENSICARE PI GRN 8

## (undated) DEVICE — SLEEVE SCD EXPRESS KNEE MEDIUM

## (undated) DEVICE — GLOVE SENSICARE PI GRN 7

## (undated) DEVICE — DRESSING GAUZE OIL EMUL 3X8

## (undated) DEVICE — PACK SIRUS BASIC V SURG STRL

## (undated) DEVICE — DRAPE INCISE IOBAN 2 23X17IN

## (undated) DEVICE — GLOVE SENSICARE PI ALOE 6

## (undated) DEVICE — PADDING WYTEX UNDRCST 6INX4YD

## (undated) DEVICE — SOL NACL IRR 1000ML BTL

## (undated) DEVICE — MANIFOLD 4 PORT

## (undated) DEVICE — KIT TRIATHLON CR TIB PREP SZ5

## (undated) DEVICE — WRAP KNEE ACCU THERM GEL PACK

## (undated) DEVICE — ALCOHOL 70% ANTISEPTIC ISO 4OZ

## (undated) DEVICE — BANDAGE ESMARK ELASTIC ST 6X9

## (undated) DEVICE — SUT 2-0 VICRYL / CT-1

## (undated) DEVICE — KIT CHECKPOINT MAKO

## (undated) DEVICE — GOWN TOGA SYS PEELWY ZIP 2 XL

## (undated) DEVICE — GLOVE SENSICARE PI GRN 8.5

## (undated) DEVICE — DRAPE THREE-QTR REINF 53X77IN

## (undated) DEVICE — SYR IRRIGATION BULB STER 60ML

## (undated) DEVICE — TOGA FLYTE PEEL AWAY XLARGE

## (undated) DEVICE — BLADE PERFORMANCE SAG 21X90MM

## (undated) DEVICE — PACK CUSTOM UNIV BASIN SLI

## (undated) DEVICE — SYR 50CC LL

## (undated) DEVICE — BNDG COFLEX FOAM LF2 ST 6X5YD

## (undated) DEVICE — BLADE MAKO STANDARD

## (undated) DEVICE — DRAPE STERI U-SHAPED 47X51IN

## (undated) DEVICE — HEMOSTAT SURGICEL 2X14IN

## (undated) DEVICE — SOL NACL IRR 3000ML

## (undated) DEVICE — STRAP OR TABLE 5IN X 72IN

## (undated) DEVICE — TOWEL OR DISP STRL BLUE 4/PK

## (undated) DEVICE — DRAPE U SPLIT SHEET 54X76IN

## (undated) DEVICE — SPONGE BULKEE II ABSRB 6X6.75

## (undated) DEVICE — DRAIN SINGLE ROUND 3/16 15F

## (undated) DEVICE — KIT EVACUATOR 3-SPRING 1/8 DRN

## (undated) DEVICE — COVER SURG LIGHT HANDLE

## (undated) DEVICE — GLOVE SENSICARE PI GRN 6

## (undated) DEVICE — GLOVE SENSICARE PI ALOE 7

## (undated) DEVICE — PAD ABDOMINAL STERILE 8X10IN

## (undated) DEVICE — WRAP SHLDR HIP ACCU THRM PACK

## (undated) DEVICE — SYR ONLY LUER LOCK 20CC

## (undated) DEVICE — MIXER BONE CEMENT

## (undated) DEVICE — INTERPULSE SET

## (undated) DEVICE — APPLICATOR CHLORAPREP ORN 26ML

## (undated) DEVICE — TOURNIQUET SB QC DP 34X4IN

## (undated) DEVICE — ELECTRODE REM PLYHSV RETURN 9

## (undated) DEVICE — SUT CTD VICRYL CT-1 27

## (undated) DEVICE — BANDAGE MATRIX HK LOOP 6IN 5YD

## (undated) DEVICE — KIT TRIATHLON CR FEM PREP SZ5

## (undated) DEVICE — DRAPE ORTH SPLIT 77X108IN

## (undated) DEVICE — NDL BLUNT W/O FILTER 18GX1.5IN

## (undated) DEVICE — TAPE SILK 3IN

## (undated) DEVICE — CATH URETHRAL RED RUBBER 18FR

## (undated) DEVICE — YANKAUER FLEX NO VENT HI CAP

## (undated) DEVICE — BONE PINS (3.2MM X 110MM)
Type: IMPLANTABLE DEVICE | Site: KNEE | Status: NON-FUNCTIONAL
Removed: 2024-08-15

## (undated) DEVICE — SYS SURGIPHOR STRL IRRG

## (undated) DEVICE — SYS LABEL CORRECT MED